# Patient Record
Sex: MALE | Race: WHITE | Employment: OTHER | URBAN - METROPOLITAN AREA
[De-identification: names, ages, dates, MRNs, and addresses within clinical notes are randomized per-mention and may not be internally consistent; named-entity substitution may affect disease eponyms.]

---

## 2017-11-03 PROBLEM — E11.9 TYPE 2 DIABETES MELLITUS WITHOUT COMPLICATION, WITH LONG-TERM CURRENT USE OF INSULIN (HCC): Status: ACTIVE | Noted: 2017-11-03

## 2017-11-03 PROBLEM — I15.2 HYPERTENSION COMPLICATING DIABETES (HCC): Status: ACTIVE | Noted: 2017-11-03

## 2017-11-03 PROBLEM — Z79.4 TYPE 2 DIABETES MELLITUS WITHOUT COMPLICATION, WITH LONG-TERM CURRENT USE OF INSULIN (HCC): Status: ACTIVE | Noted: 2017-11-03

## 2017-11-03 PROBLEM — E11.59 HYPERTENSION COMPLICATING DIABETES (HCC): Status: ACTIVE | Noted: 2017-11-03

## 2017-11-09 ENCOUNTER — HOSPITAL ENCOUNTER (OUTPATIENT)
Dept: GENERAL RADIOLOGY | Age: 67
Discharge: HOME | End: 2017-11-09
Attending: INTERNAL MEDICINE
Payer: MEDICARE

## 2017-11-09 DIAGNOSIS — R09.1 PLEURISY: ICD-10-CM

## 2017-11-09 PROCEDURE — 71020 XR CHEST PA LAT: CPT

## 2017-12-13 ENCOUNTER — HOSPITAL ENCOUNTER (OUTPATIENT)
Dept: ULTRASOUND IMAGING | Age: 67
Discharge: HOME | End: 2017-12-13
Attending: INTERNAL MEDICINE
Payer: MEDICARE

## 2017-12-13 DIAGNOSIS — I10 ESSENTIAL HYPERTENSION, MALIGNANT: ICD-10-CM

## 2017-12-13 DIAGNOSIS — E11.22 TYPE 2 DIABETES MELLITUS WITH ESRD (END-STAGE RENAL DISEASE) (HCC): ICD-10-CM

## 2017-12-13 DIAGNOSIS — N20.0 URIC ACID NEPHROLITHIASIS: ICD-10-CM

## 2017-12-13 DIAGNOSIS — N18.6 TYPE 2 DIABETES MELLITUS WITH ESRD (END-STAGE RENAL DISEASE) (HCC): ICD-10-CM

## 2017-12-13 DIAGNOSIS — N18.30 CHRONIC KIDNEY DISEASE, STAGE III (MODERATE) (HCC): ICD-10-CM

## 2017-12-13 PROCEDURE — 76770 US EXAM ABDO BACK WALL COMP: CPT

## 2018-05-14 PROBLEM — E66.01 SEVERE OBESITY (BMI 35.0-39.9) WITH COMORBIDITY (HCC): Status: ACTIVE | Noted: 2018-05-14

## 2018-05-14 PROBLEM — E11.21 TYPE 2 DIABETES WITH NEPHROPATHY (HCC): Status: ACTIVE | Noted: 2018-05-14

## 2020-07-16 ENCOUNTER — HOSPITAL ENCOUNTER (OUTPATIENT)
Dept: PREADMISSION TESTING | Age: 70
Discharge: HOME | End: 2020-07-16
Payer: MEDICARE

## 2020-07-16 DIAGNOSIS — U07.1 COVID-19: ICD-10-CM

## 2020-07-16 PROCEDURE — 87635 SARS-COV-2 COVID-19 AMP PRB: CPT

## 2020-07-18 LAB — SARS-COV-2, COV2NT: NOT DETECTED

## 2020-07-21 ENCOUNTER — ANESTHESIA (OUTPATIENT)
Dept: NON INVASIVE DIAGNOSTICS | Age: 70
End: 2020-07-21
Payer: MEDICARE

## 2020-07-21 ENCOUNTER — ANESTHESIA EVENT (OUTPATIENT)
Dept: NON INVASIVE DIAGNOSTICS | Age: 70
End: 2020-07-21
Payer: MEDICARE

## 2020-07-21 ENCOUNTER — HOSPITAL ENCOUNTER (OUTPATIENT)
Dept: NON INVASIVE DIAGNOSTICS | Age: 70
Discharge: HOME | End: 2020-07-21
Attending: INTERNAL MEDICINE
Payer: MEDICARE

## 2020-07-21 VITALS
WEIGHT: 293 LBS | OXYGEN SATURATION: 99 % | BODY MASS INDEX: 39.68 KG/M2 | RESPIRATION RATE: 18 BRPM | SYSTOLIC BLOOD PRESSURE: 185 MMHG | HEIGHT: 72 IN | DIASTOLIC BLOOD PRESSURE: 75 MMHG | HEART RATE: 62 BPM

## 2020-07-21 DIAGNOSIS — I35.1 AORTIC VALVE INSUFFICIENCY, ETIOLOGY OF CARDIAC VALVE DISEASE UNSPECIFIED: ICD-10-CM

## 2020-07-21 LAB
ANION GAP SERPL CALC-SCNC: 6 MMOL/L (ref 5–15)
BUN SERPL-MCNC: 41 MG/DL (ref 6–20)
BUN/CREAT SERPL: 19 (ref 12–20)
CALCIUM SERPL-MCNC: 8.2 MG/DL (ref 8.5–10.1)
CHLORIDE SERPL-SCNC: 113 MMOL/L (ref 97–108)
CO2 SERPL-SCNC: 22 MMOL/L (ref 21–32)
CREAT SERPL-MCNC: 2.17 MG/DL (ref 0.7–1.3)
ECHO AV MEAN GRADIENT: 35.42 MMHG
ECHO AV PEAK GRADIENT: 62.5 MMHG
ECHO AV PEAK VELOCITY: 395.28 CM/S
ECHO AV VTI: 90.82 CM
ECHO LVOT PEAK GRADIENT: 8.24 MMHG
ECHO LVOT PEAK VELOCITY: 143.56 CM/S
ECHO LVOT VTI: 32.27 CM
GLUCOSE SERPL-MCNC: 175 MG/DL (ref 65–100)
POTASSIUM SERPL-SCNC: 4.5 MMOL/L (ref 3.5–5.1)
SODIUM SERPL-SCNC: 141 MMOL/L (ref 136–145)

## 2020-07-21 PROCEDURE — 74011250636 HC RX REV CODE- 250/636: Performed by: NURSE ANESTHETIST, CERTIFIED REGISTERED

## 2020-07-21 PROCEDURE — 80048 BASIC METABOLIC PNL TOTAL CA: CPT

## 2020-07-21 PROCEDURE — 93325 DOPPLER ECHO COLOR FLOW MAPG: CPT

## 2020-07-21 PROCEDURE — 36415 COLL VENOUS BLD VENIPUNCTURE: CPT

## 2020-07-21 PROCEDURE — 76060000032 HC ANESTHESIA 0.5 TO 1 HR

## 2020-07-21 PROCEDURE — 74011000250 HC RX REV CODE- 250: Performed by: NURSE ANESTHETIST, CERTIFIED REGISTERED

## 2020-07-21 PROCEDURE — 74011250636 HC RX REV CODE- 250/636: Performed by: INTERNAL MEDICINE

## 2020-07-21 RX ORDER — PROPOFOL 10 MG/ML
INJECTION, EMULSION INTRAVENOUS AS NEEDED
Status: DISCONTINUED | OUTPATIENT
Start: 2020-07-21 | End: 2020-07-21 | Stop reason: HOSPADM

## 2020-07-21 RX ORDER — LIDOCAINE HYDROCHLORIDE 20 MG/ML
INJECTION, SOLUTION EPIDURAL; INFILTRATION; INTRACAUDAL; PERINEURAL AS NEEDED
Status: DISCONTINUED | OUTPATIENT
Start: 2020-07-21 | End: 2020-07-21 | Stop reason: HOSPADM

## 2020-07-21 RX ORDER — HYDRALAZINE HYDROCHLORIDE 20 MG/ML
10 INJECTION INTRAMUSCULAR; INTRAVENOUS ONCE
Status: COMPLETED | OUTPATIENT
Start: 2020-07-21 | End: 2020-07-21

## 2020-07-21 RX ORDER — SODIUM CHLORIDE 9 MG/ML
INJECTION, SOLUTION INTRAVENOUS
Status: DISCONTINUED | OUTPATIENT
Start: 2020-07-21 | End: 2020-07-21 | Stop reason: HOSPADM

## 2020-07-21 RX ORDER — GLYCOPYRROLATE 0.2 MG/ML
INJECTION INTRAMUSCULAR; INTRAVENOUS AS NEEDED
Status: DISCONTINUED | OUTPATIENT
Start: 2020-07-21 | End: 2020-07-21 | Stop reason: HOSPADM

## 2020-07-21 RX ADMIN — HYDRALAZINE HYDROCHLORIDE 10 MG: 20 INJECTION INTRAMUSCULAR; INTRAVENOUS at 09:57

## 2020-07-21 RX ADMIN — PROPOFOL 50 MG: 10 INJECTION, EMULSION INTRAVENOUS at 08:19

## 2020-07-21 RX ADMIN — LIDOCAINE HYDROCHLORIDE 80 MG: 20 INJECTION, SOLUTION EPIDURAL; INFILTRATION; INTRACAUDAL; PERINEURAL at 08:19

## 2020-07-21 RX ADMIN — PROPOFOL 30 MG: 10 INJECTION, EMULSION INTRAVENOUS at 08:20

## 2020-07-21 RX ADMIN — GLYCOPYRROLATE 0.2 MG: 0.2 INJECTION, SOLUTION INTRAMUSCULAR; INTRAVENOUS at 08:20

## 2020-07-21 RX ADMIN — PROPOFOL 30 MG: 10 INJECTION, EMULSION INTRAVENOUS at 08:33

## 2020-07-21 RX ADMIN — SODIUM CHLORIDE: 900 INJECTION, SOLUTION INTRAVENOUS at 08:06

## 2020-07-21 RX ADMIN — PROPOFOL 30 MG: 10 INJECTION, EMULSION INTRAVENOUS at 08:21

## 2020-07-21 RX ADMIN — PROPOFOL 30 MG: 10 INJECTION, EMULSION INTRAVENOUS at 08:25

## 2020-07-21 RX ADMIN — PROPOFOL 30 MG: 10 INJECTION, EMULSION INTRAVENOUS at 08:27

## 2020-07-21 RX ADMIN — PROPOFOL 30 MG: 10 INJECTION, EMULSION INTRAVENOUS at 08:23

## 2020-07-21 RX ADMIN — PROPOFOL 30 MG: 10 INJECTION, EMULSION INTRAVENOUS at 08:29

## 2020-07-21 RX ADMIN — PROPOFOL 30 MG: 10 INJECTION, EMULSION INTRAVENOUS at 08:36

## 2020-07-21 RX ADMIN — HYDRALAZINE HYDROCHLORIDE 10 MG: 20 INJECTION INTRAMUSCULAR; INTRAVENOUS at 10:17

## 2020-07-21 RX ADMIN — PROPOFOL 30 MG: 10 INJECTION, EMULSION INTRAVENOUS at 08:39

## 2020-07-21 NOTE — PROGRESS NOTES
Anesthesia name:  Evelyn Weeks CRNA:     Anesthesia is present for case. Refer to anesthesia log for vitals.

## 2020-07-21 NOTE — ANESTHESIA POSTPROCEDURE EVALUATION
Post-Anesthesia Evaluation and Assessment    Patient: Kaylin Kumar MRN: [id number]  SSN: [id number]    YOB: 1950  Age: 81 y.o. Sex: male       Cardiovascular Function/Vital Signs  Visit Vitals  BP (!) 179/92 (BP 1 Location: Left arm, BP Patient Position: At rest)   Pulse (!) 52   Resp 14   Ht 6' (1.829 m)   Wt 132.9 kg (293 lb)   SpO2 98%   BMI 39.74 kg/m²       Patient is status post * No anesthesia type entered * anesthesia for * No procedures listed *. Nausea/Vomiting: None    Postoperative hydration reviewed and adequate. Pain:  Pain Scale 1: Numeric (0 - 10) (07/21/20 0930)  Pain Intensity 1: 0 (07/21/20 0930)   Managed    Neurological Status: At baseline    Mental Status and Level of Consciousness: Alert and oriented to person, place, and time    Pulmonary Status:   O2 Device: Room air (07/21/20 0930)   Adequate oxygenation and airway patent    Complications related to anesthesia: None    Post-anesthesia assessment completed. No concerns    Signed By: Johnny Lozoya MD     July 21, 2020              * No procedures listed *. MAC    <BSHSIANPOST>    INITIAL Post-op Vital signs:   Vitals Value Taken Time   /92 7/21/2020  9:32 AM   Temp     Pulse 54 7/21/2020  9:44 AM   Resp 13 7/21/2020  9:44 AM   SpO2 99 % 7/21/2020  9:44 AM   Vitals shown include unvalidated device data.

## 2020-07-21 NOTE — PROCEDURES
Transesophageal Procedure Note  [facility]    Patient: Kadi Quarles  MRN: [id number]  SSN: [id number]   YOB: 1950 Age: 81 y.o. 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 Debra Mckoy MD    Assistant(s):  None    Anesthesia: MAC     Estimated Blood Loss: None    Specimens Removed: None    Findings:   · Normal cavity size and systolic function (ejection fraction normal). Mild concentric hypertrophy. Estimated left ventricular ejection fraction is 55 - 60%. Left ventricular diastolic dysfunction. · Mildly dilated left atrium. No spontaneous echo contrast Left atrial appendage velocity is normal (greater than 40 cm/sec). · Agitated saline contrast study was performed and color flow Doppler was used. There was no shunting at baseline. · Prosthetic aortic valve. Aortic valve mean gradient is 35.4 mmHg. Aortic valve area is 1 cm2. Moderate to severe aortic valve stenosis is present. There is a 23 mm [employer] Trifecta bioprosthetic aortic valve. · Mild mitral valve regurgitation is present. LVOT/AV VTI 32.3/90.8 cm  AV Vmax 3.95 m/s  AV MG 35.4 mmHg  AV PG 62.5 mmHg  LVOTd 1.9 cm     TAYLER 1.01 cm2  DI 0.36     Plan RHC/LVEDP/aortic valve study (no coronaries) to evaluate aortic valve hemodynamics. Case discussed with Dr. Kelly Velazquez. Results and plans discussed with patient and his wife. Complications: None     Implants:  None    Signed by:   Davian Johnson MD  Structural / [id number]  07/21/20 9:52 AM

## 2020-07-21 NOTE — ANESTHESIA PREPROCEDURE EVALUATION
Anesthetic History   No history of anesthetic complications            Review of Systems / Medical History  Patient summary reviewed, nursing notes reviewed and pertinent labs reviewed    Pulmonary  Within defined limits                 Neuro/Psych   Within defined limits           Cardiovascular    Hypertension  Valvular problems/murmurs: aortic stenosis        CAD         GI/Hepatic/Renal         Renal disease: CRI and stones       Endo/Other    Diabetes    Morbid obesity     Other Findings              Physical Exam    Airway  Mallampati: II  TM Distance: > 6 cm  Neck ROM: normal range of motion   Mouth opening: Normal     Cardiovascular  Regular rate and rhythm,  S1 and S2 normal,  no murmur, click, rub, or gallop             Dental  No notable dental hx       Pulmonary  Breath sounds clear to auscultation               Abdominal  GI exam deferred       Other Findings            Anesthetic Plan    ASA: 3  Anesthesia type: MAC          Induction: Intravenous  Anesthetic plan and risks discussed with: Patient

## 2020-07-30 PROBLEM — Z91.199 NONADHERENCE TO MEDICAL TREATMENT: Chronic | Status: ACTIVE | Noted: 2020-07-30

## 2020-07-30 PROBLEM — E11.59 HYPERTENSION COMPLICATING DIABETES (HCC): Status: RESOLVED | Noted: 2017-11-03 | Resolved: 2020-07-30

## 2020-07-30 PROBLEM — I15.2 HYPERTENSION COMPLICATING DIABETES (HCC): Status: RESOLVED | Noted: 2017-11-03 | Resolved: 2020-07-30

## 2020-08-06 ENCOUNTER — HOSPITAL ENCOUNTER (OUTPATIENT)
Dept: PREADMISSION TESTING | Age: 70
Discharge: HOME | End: 2020-08-06
Payer: MEDICARE

## 2020-08-06 DIAGNOSIS — U07.1 COVID-19: ICD-10-CM

## 2020-08-06 PROCEDURE — 87635 SARS-COV-2 COVID-19 AMP PRB: CPT

## 2020-08-07 LAB — SARS-COV-2, COV2NT: NOT DETECTED

## 2020-08-10 ENCOUNTER — HOSPITAL ENCOUNTER (OUTPATIENT)
Age: 70
Setting detail: OUTPATIENT SURGERY
Discharge: HOME | End: 2020-08-10
Attending: INTERNAL MEDICINE | Admitting: INTERNAL MEDICINE
Payer: MEDICARE

## 2020-08-10 VITALS
HEART RATE: 70 BPM | HEIGHT: 72 IN | RESPIRATION RATE: 20 BRPM | OXYGEN SATURATION: 94 % | WEIGHT: 296 LBS | BODY MASS INDEX: 40.09 KG/M2 | DIASTOLIC BLOOD PRESSURE: 67 MMHG | TEMPERATURE: 98.2 F | SYSTOLIC BLOOD PRESSURE: 163 MMHG

## 2020-08-10 DIAGNOSIS — I35.0 AORTIC VALVE STENOSIS, ETIOLOGY OF CARDIAC VALVE DISEASE UNSPECIFIED: ICD-10-CM

## 2020-08-10 LAB
GLUCOSE BLD STRIP.AUTO-MCNC: 134 MG/DL (ref 65–100)
GLUCOSE BLD STRIP.AUTO-MCNC: 99 MG/DL (ref 65–100)
SERVICE CMNT-IMP: ABNORMAL
SERVICE CMNT-IMP: NORMAL

## 2020-08-10 PROCEDURE — 74011636320 HC RX REV CODE- 636/320: Performed by: INTERNAL MEDICINE

## 2020-08-10 PROCEDURE — C1769 GUIDE WIRE: HCPCS | Performed by: INTERNAL MEDICINE

## 2020-08-10 PROCEDURE — 82962 GLUCOSE BLOOD TEST: CPT

## 2020-08-10 PROCEDURE — 77030013797 HC KT TRNSDUC PRSSR EDWD -A: Performed by: INTERNAL MEDICINE

## 2020-08-10 PROCEDURE — 99152 MOD SED SAME PHYS/QHP 5/>YRS: CPT | Performed by: INTERNAL MEDICINE

## 2020-08-10 PROCEDURE — 74011250636 HC RX REV CODE- 250/636: Performed by: INTERNAL MEDICINE

## 2020-08-10 PROCEDURE — 77030019569 HC BND COMPR RAD TERU -B: Performed by: INTERNAL MEDICINE

## 2020-08-10 PROCEDURE — 99153 MOD SED SAME PHYS/QHP EA: CPT | Performed by: INTERNAL MEDICINE

## 2020-08-10 PROCEDURE — 74011000250 HC RX REV CODE- 250: Performed by: INTERNAL MEDICINE

## 2020-08-10 PROCEDURE — 77030004532 HC CATH ANGI DX IMP BSC -A: Performed by: INTERNAL MEDICINE

## 2020-08-10 PROCEDURE — C1894 INTRO/SHEATH, NON-LASER: HCPCS | Performed by: INTERNAL MEDICINE

## 2020-08-10 PROCEDURE — 93461 R&L HRT ART/VENTRICLE ANGIO: CPT | Performed by: INTERNAL MEDICINE

## 2020-08-10 PROCEDURE — C1887 CATHETER, GUIDING: HCPCS | Performed by: INTERNAL MEDICINE

## 2020-08-10 PROCEDURE — 77030010221 HC SPLNT WR POS TELE -B: Performed by: INTERNAL MEDICINE

## 2020-08-10 PROCEDURE — C1751 CATH, INF, PER/CENT/MIDLINE: HCPCS | Performed by: INTERNAL MEDICINE

## 2020-08-10 RX ORDER — SODIUM CHLORIDE 0.9 % (FLUSH) 0.9 %
5-40 SYRINGE (ML) INJECTION AS NEEDED
Status: DISCONTINUED | OUTPATIENT
Start: 2020-08-10 | End: 2020-08-10 | Stop reason: HOSPADM

## 2020-08-10 RX ORDER — AMLODIPINE BESYLATE 5 MG/1
5 TABLET ORAL DAILY
Status: ON HOLD | COMMUNITY
End: 2020-09-29 | Stop reason: SDUPTHER

## 2020-08-10 RX ORDER — SODIUM CHLORIDE 9 MG/ML
150 INJECTION, SOLUTION INTRAVENOUS CONTINUOUS
Status: DISCONTINUED | OUTPATIENT
Start: 2020-08-10 | End: 2020-08-10 | Stop reason: HOSPADM

## 2020-08-10 RX ORDER — NALOXONE HYDROCHLORIDE 0.4 MG/ML
0.4 INJECTION, SOLUTION INTRAMUSCULAR; INTRAVENOUS; SUBCUTANEOUS AS NEEDED
Status: DISCONTINUED | OUTPATIENT
Start: 2020-08-10 | End: 2020-08-10 | Stop reason: HOSPADM

## 2020-08-10 RX ORDER — SODIUM CHLORIDE 0.9 % (FLUSH) 0.9 %
5-40 SYRINGE (ML) INJECTION EVERY 8 HOURS
Status: DISCONTINUED | OUTPATIENT
Start: 2020-08-10 | End: 2020-08-10 | Stop reason: HOSPADM

## 2020-08-10 RX ORDER — FENTANYL CITRATE 50 UG/ML
INJECTION, SOLUTION INTRAMUSCULAR; INTRAVENOUS AS NEEDED
Status: DISCONTINUED | OUTPATIENT
Start: 2020-08-10 | End: 2020-08-10 | Stop reason: HOSPADM

## 2020-08-10 RX ORDER — DIPHENHYDRAMINE HYDROCHLORIDE 50 MG/ML
25 INJECTION, SOLUTION INTRAMUSCULAR; INTRAVENOUS
Status: DISCONTINUED | OUTPATIENT
Start: 2020-08-10 | End: 2020-08-10 | Stop reason: HOSPADM

## 2020-08-10 RX ORDER — HYDROCORTISONE SODIUM SUCCINATE 100 MG/2ML
100 INJECTION, POWDER, FOR SOLUTION INTRAMUSCULAR; INTRAVENOUS ONCE
Status: COMPLETED | OUTPATIENT
Start: 2020-08-10 | End: 2020-08-10

## 2020-08-10 RX ORDER — VERAPAMIL HYDROCHLORIDE 2.5 MG/ML
INJECTION, SOLUTION INTRAVENOUS AS NEEDED
Status: DISCONTINUED | OUTPATIENT
Start: 2020-08-10 | End: 2020-08-10 | Stop reason: HOSPADM

## 2020-08-10 RX ORDER — DIPHENHYDRAMINE HYDROCHLORIDE 50 MG/ML
25 INJECTION, SOLUTION INTRAMUSCULAR; INTRAVENOUS ONCE
Status: COMPLETED | OUTPATIENT
Start: 2020-08-10 | End: 2020-08-10

## 2020-08-10 RX ORDER — HEPARIN SODIUM 200 [USP'U]/100ML
INJECTION, SOLUTION INTRAVENOUS
Status: COMPLETED | OUTPATIENT
Start: 2020-08-10 | End: 2020-08-10

## 2020-08-10 RX ORDER — HEPARIN SODIUM 1000 [USP'U]/ML
INJECTION, SOLUTION INTRAVENOUS; SUBCUTANEOUS AS NEEDED
Status: DISCONTINUED | OUTPATIENT
Start: 2020-08-10 | End: 2020-08-10 | Stop reason: HOSPADM

## 2020-08-10 RX ORDER — LIDOCAINE HYDROCHLORIDE 10 MG/ML
INJECTION INFILTRATION; PERINEURAL AS NEEDED
Status: DISCONTINUED | OUTPATIENT
Start: 2020-08-10 | End: 2020-08-10 | Stop reason: HOSPADM

## 2020-08-10 RX ORDER — MIDAZOLAM HYDROCHLORIDE 1 MG/ML
INJECTION, SOLUTION INTRAMUSCULAR; INTRAVENOUS AS NEEDED
Status: DISCONTINUED | OUTPATIENT
Start: 2020-08-10 | End: 2020-08-10 | Stop reason: HOSPADM

## 2020-08-10 RX ADMIN — SODIUM CHLORIDE 150 ML/HR: 900 INJECTION, SOLUTION INTRAVENOUS at 17:20

## 2020-08-10 RX ADMIN — HYDROCORTISONE SODIUM SUCCINATE 100 MG: 100 INJECTION, POWDER, FOR SOLUTION INTRAMUSCULAR; INTRAVENOUS at 14:40

## 2020-08-10 RX ADMIN — DIPHENHYDRAMINE HYDROCHLORIDE 25 MG: 50 INJECTION, SOLUTION INTRAMUSCULAR; INTRAVENOUS at 14:39

## 2020-08-10 NOTE — ROUTINE PROCESS
Cardiac Cath Lab Recovery Arrival Note: 
 
 
Patience Mares arrived to Cardiac Cath Lab, Recovery Area. Staff introduced to patient. Patient identifiers verified with NAME and DATE OF BIRTH. Procedure verified with patient. Consent forms reviewed and signed by patient or authorized representative and verified. Allergies verified. Patient and family oriented to department. Patient and family informed of procedure and plan of care. Questions answered with review. Patient prepped for procedure, per orders from physician, prior to arrival. 
 
Patient on cardiac monitor, non-invasive blood pressure, SPO2 monitor. On RA. Patient is A&Ox 4. Patient reports no pain. Patient in stretcher, in low position, with side rails up, call bell within reach, patient instructed to call if assistance as needed. Patient prep in: [address], [place]. Patient family has pager # Family in: . Prep by: ERMELINDA Long RN

## 2020-08-10 NOTE — DISCHARGE INSTRUCTIONS
**You will receive a call from Dr. Magalene Cranker office to get repeat blood work done to check your kidney function -- this will be checked sometime in the next 1-2 weeks. **If your kidney function is stable, then we will do a CT scan of your chest/abdomen/pelvis to plan a possible valve replacement procedure. YOUR CURRENT MEDICATIONS  Current Discharge Medication List      CONTINUE these medications which have NOT CHANGED    Details   amLODIPine (NORVASC) 5 mg tablet Take 5 mg by mouth daily. insulin lispro protamine/insulin lispro (HumaLOG Mix 75-25,U-100,Insuln) 100 unit/mL (75-25) injection 50 Units by SubCUTAneous route Before breakfast and dinner. Indications: type 2 diabetes mellitus  Qty: 8 Vial, Refills: 5      pioglitazone (ACTOS) 15 mg tablet Take 1 Tab by mouth daily. Qty: 90 Tab, Refills: 0      losartan (COZAAR) 100 mg tablet Take 1 Tab by mouth daily. Qty: 90 Tab, Refills: 0      allopurinoL (ZYLOPRIM) 100 mg tablet Take 0.5 Tabs by mouth daily. (Dose = 0.5 x 100 mg tablet)  Qty: 45 Tab, Refills: 0      atorvastatin (Lipitor) 40 mg tablet Take 1 Tab by mouth daily. Qty: 90 Tab, Refills: 0      Insulin Syringe-Needle U-100 (BD INSULIN SYRINGE ULTRA-FINE) 1 mL 30 gauge x 1/2\" syrg Use twice a day. Dx: E11.9  Qty: 300 Syringe, Refills: 4    Associated Diagnoses: Type 2 diabetes mellitus without complication, with long-term current use of insulin (HCC)      furosemide (LASIX) 40 mg tablet Take 1 Tab by mouth daily as needed for Other (swelling). Qty: 90 Tab, Refills: 4      CALCIUM CARBONATE (CALCIUM 500 PO) Take 300 mg by mouth daily. ascorbic acid, vitamin C, (VITAMIN C) 1,000 mg tablet Take 2,000 mg by mouth daily. magnesium oxide (MAG-OX) 400 mg tablet Take 400 mg by mouth daily. potassium 99 mg tablet Take 99 mg by mouth daily. turmeric root extract 500 mg cap Take  by mouth. cholecalciferol, vitamin D3, 2,000 unit tab Take 5,000 Units by mouth daily.       b complex vitamins (B COMPLEX 1) tablet Take 1 Tab by mouth daily. After Your Cardiac Catheterization    Cardiac catheterization (also called cardiac cath) is an invasive imaging procedure that allows your doctor to look at your coronary arteries to diagnose coronary artery disease. It can also be used to measure pressures in your chambers, and evaluate the function of your heart. Instructions for going home after Cardiac Catheterization    Care for the Catheter Insertion Site  Procedures may be performed in the femoral artery in the groin (in the area at the top of your thigh) or in the radial artery in your arm. When you go home, there will be a bandage (dressing) over the catheter insertion site (also called the wound site).  The morning after your procedure, you may take the dressing off. The easiest way to do this is when you are showering, get the tape and dressing wet and remove it.  After the bandage is removed, cover the area with a small adhesive bandage. It is normal for the catheter insertion site to be black and blue for a couple of days. The site may also be slightly swollen and pink, and there may be a small lump (about the size of a quarter) at the site.  Wash the catheter insertion site at least once daily with soap and water. Place soapy water on your hand or washcloth and gently wash the insertion site; do not rub.  Keep the area clean and dry when you are not showering.  Do not use powders, creams, lotions or ointment on the wound site.  Wear loose clothes and loose underwear.  Do not take a bath, tub soak, go in a Jacuzzi, or swim in a pool or lake for one week after the procedure.  You may notice a small lump at the site. This is normal and may last up to 6 weeks. CHECK THE CATHETER INSERTION SITE DAILY:    If bleeding at the cath site occurs, take a clean washcloth and apply direct pressure just above the puncture site for at least 15 minutes.   Call [phone] immediately if the bleeding is not controlled. Continue to apply direct pressure until an ambulance gets to your location. Do not try to drive yourself or have someone else drive you to the hospital.  Have the ambulance bring you to the emergency room. Activity Guidelines  Your doctor will tell you when you can resume activities. In general, you will need to take it easy for the first two days after you get home. You can expect to feel tired and weak the day after the procedure. Take walks around your house and plan to rest during the day. For femoral cardiac cath   Do not strain during bowel movements for the first 3 to 4 days after the procedure to prevent bleeding from the catheter insertion site.  Avoid heavy lifting (more than 10 pounds) and pushing or pulling heavy objects for the first 5 to 7 days after the procedure.  Do not participate in strenuous activities for 5 days after the procedure. This includes most sports - jogging, golfing, play tennis, and bowling.  You may climb stairs if needed, but walk up and down the stairs more slowly than usual.    Gradually increase your activities until you reach your normal activity level within one week after the procedure. For radial cardiac cath   Do not participate in strenuous activities for 2 days after the procedure. This includes most sports - jogging, golfing, play tennis, and bowling.  Gradually increase your activities until you reach your normal activity level within two days after the procedure. Ask your doctor when it is safe to   Return to work.  Resume sexual activity.  Resume driving. Most people are able to resume driving within 24 hours after going home. Medications   Please review your medications with your doctor before you go home. Ask your doctor if you should continue taking the medications you were taking before the procedure.     If you have diabetes, your doctor may adjust your diabetes medications for one to two days after your procedure. Please be sure to ask for specific directions about taking your diabetes medication after the procedure.  Depending on the results of your procedure, your doctor may prescribe new medication. Please make sure you understand what medications you should be taking after the procedure and how often to take them.  You may use Tylenol 325mg 1-2 tablets every 6 hours as needed for pain or discomfort, unless otherwise instructed. If you have significant         discomfort more than 48 hours after your procedure, please call your physicians office.  If you take METFORMIN, do NOT take this for the next 2 days after your procedure. Fluid Guidelines  Be sure to drink eight to ten glasses of clear fluids (water is preferred) for the 24 hours to flush the contrast material from your system. Importance of a Heart-Healthy Lifestyle  It is important for you to be committed to leading a heart-healthy lifestyle. Your health care team can help you achieve your goals, but it is up to you to take your medications as prescribed, make dietary changes, quit smoking, exercise regularly, keep your follow-up appointments and be an active member of the treatment team.    Follow Up  Please call your cardiologist to schedule a follow-up appointment in the next 1-2 weeks if possible. Ask your primary care doctor when you should return for follow-up. Please ask your doctor if you have any questions about cardiac catheterization, angioplasty or stenting. If possible, have someone stay with you for the first night. It is normal to feel tired for the first couple of days. Take it easy and follow your physicians instructions on activity. CALL THE PHYSICIAN:  ? If the site becomes red, swollen, or feels warm to the touch, or is healing poorly    ? If you note any large/extending bruise, fever >101.0 or chills  ?  If your extremity has numbness, tingling, discoloration, abnormal swelling, tightness or pain   ? If you have difficulty with urination or develop nausea, vomiting, or severe abdominal pain    SIGNS AND SYMPTOMS:  ? Notify your physician for new or recurrent symptoms of chest discomfort, unusual shortness of breath or fatigue. These could be signs of a problem and should be discussed with your physician. ? For significant chest pain or symptoms of angina not relieved with rest:  if you have been prescribed Nitroglycerin, take as directed (taken under the tongue, one at a time 5 minutes apart for a total of 3 doses, sitting down). If the discomfort is not relieved after the 3rd Nitroglycerin, call 911. If you have not been prescribed Nitroglycerin and your chest discomfort is significant, call 911. Take the ambulance, do not try to drive yourself or have someone else drive you to the hospital.     AFTER CARE:  ? Follow up with your physician as instructed  ? Follow a heart healthy diet with proper portion control, daily stress management, daily exercise, blood pressure and cholesterol control, and smoking cessation. The success of your stent, if you had one placed, and the prevention of future catheterizations heavily depends on your lifestyle changes you make now! ? You may start walking short distances the day of your procedure. Gradually increase as tolerated each day. Current activity recommendations are 30 minutes of exercise at least 5 days a week. Work up to this as you recover. Walk, ride a bike, or choose any other activity you enjoy to reach this activity goal.   ? Avoid walking outside in extremes of heat or cold. Walk inside (at home, at the mall, or at a large store) when it is cold and windy or hot and humid. ? If you had a stent placed, consider [facility] as a resource to help you make the needed lifestyle changes to live a heart healthy lifestyle. Discuss your candidacy with your physician.      If you have questions, call your physicians office or the Cardiac Cath Lab at [phone]. The Cath Lab is operational from 6:30 a.m. to 5:00 p.m., Monday through Friday. After hours, notify your physician. [address] [place] [address] can be reached at [phone]. [facility] is operational Monday-Thursday 8:30 a.m. to 5:00 p.m. and Friday 8:30 a.m. to 12:00 p.m.     Remember:  IN CASE OF BLEEDING: KEEP FIRM PRESSURE ON THE PROCEDURE SITE AND CALL 911 IF NOT CONTROLLED

## 2020-08-10 NOTE — PROCEDURES
Cardiac Catheterization Lab Procedure Note  [facility]    Patient: Brad Cole  MRN: [id number]  SSN: [id number]   YOB: 1950 Age: 78 y.o. Sex: male      Date of Procedure: 8/10/2020     Pre-procedure Diagnosis: Prosthetic aortic valve stenosis, multivessel CAD    Post-procedure Diagnosis: Prosthetic aortic valve stenosis, multivessel CAD    Procedure: Right Heart Cath and Left Heart Cath with Bypass Grafts / Coronary Angiography, Aortic Valve Hemodynamic Study    :  Dr. Blanka Hammond MD    Assistant(s):  None    Anesthesia: Moderate Sedation     Estimated Blood Loss: Less than 10 mL     Specimens Removed: None    Findings:   · Catheters used: 5 Fr JL4, JR4, LCB diagnostic catheters; 6 Fr [place] catheter  · Successful hemostasis of the 6 Fr LRA access site using a TR band  · Succsssful hemostasis of the 6 Fr RIJ access site using manual compression     1. No significant change in native CAD with high-grade stenosis in proximal ramus intermedius and sub-totally occluded proximal LAD.     2. Widely patent LIMA-LAD and SVG-ramus intermedius.     3. Moderate/severe bioprosthetic aortic valve stenosis (EOA 1.31-1.40 cm2, EOAi 0.52-0.55 cm2/m2 using BSA 2.52, MG 39 mmHg). No hemodynamically significant aortic regurgitation. There is likely mild to moderate structural valve degeneration with a component of prosthesis-patient mismatch, leading to a low EOAi.        4. High-normal right-sided filling pressure (RA 12/11/9) with moderately elevated left-sided filling pressures (PCWP 13/13/11; LVEDP 25 mmHg).     5. High-normal pulmonary artery pressures (PA 34/13/22) with normal PVR (1.93 Farah), TPG 11 mmHg.     6. Normal cardiac output (Sariah 5.71 L/min) and cardiac index (Sariah 2.2 L/min/m2).     7. Normal SVR (1205 dynes/sec/cm-5).    8. Results discussed with the patient, the patient's wife and Dr. Augusto Randolph (the patient's surgeon in 2014 for CABG + AVR).      9.  Continue evaluation for possible Marybeth TAVR vs. re-do bioprosthetic AVR with root enlargement vs. redo mechanical AVR. Complications: None     Implants:  None    Signed by:   Nadeem Umanzor.  Neha Wilcox MD  Structural / [id number]  08/10/20 4:49 PM

## 2020-09-15 ENCOUNTER — OFFICE VISIT (OUTPATIENT)
Dept: CARDIOLOGY CLINIC | Age: 70
End: 2020-09-15
Payer: MEDICARE

## 2020-09-15 VITALS
BODY MASS INDEX: 40.62 KG/M2 | SYSTOLIC BLOOD PRESSURE: 164 MMHG | RESPIRATION RATE: 16 BRPM | HEART RATE: 68 BPM | DIASTOLIC BLOOD PRESSURE: 82 MMHG | WEIGHT: 299.5 LBS | OXYGEN SATURATION: 98 %

## 2020-09-15 DIAGNOSIS — I35.0 AORTIC VALVE STENOSIS, ETIOLOGY OF CARDIAC VALVE DISEASE UNSPECIFIED: Primary | ICD-10-CM

## 2020-09-15 PROCEDURE — 99204 OFFICE O/P NEW MOD 45 MIN: CPT | Performed by: THORACIC SURGERY (CARDIOTHORACIC VASCULAR SURGERY)

## 2020-09-15 RX ORDER — GUAIFENESIN 100 MG/5ML
162 LIQUID (ML) ORAL DAILY
COMMUNITY
End: 2020-09-29

## 2020-09-15 NOTE — PROGRESS NOTES
Patient: La Nena Mobley   Age: 78 y.o. Patient Care Team:  Brendan Chi MD as PCP - General (Internal Medicine)  Carter Cobb MD as Referring Provider (Cardiology)    PCP: Brendan Chi MD    Cardiologist: Dr. Twan Flores    Diagnosis/Reason for Consultation: The encounter diagnosis was Aortic valve stenosis, etiology of cardiac valve disease unspecified. Problem List:   Patient Active Problem List   Diagnosis Code    DM (diabetes mellitus) ([facility] E11.9    Hyperlipidemia E78.5    Kidney stone N20.0    CKD (chronic kidney disease), stage III ([facility] N18.3    Hypovitaminosis D E55.9    S/P CABG x 2 Z95.1    S/P AVR (aortic valve replacement) Z95.2    Hypotension I95.9    LBP (low back pain) M54.5    Type 2 diabetes mellitus without complication, with long-term current use of insulin ([facility]) E11.9, Z79.4    Type 2 diabetes with nephropathy ([facility] E11.21    Severe obesity (BMI 35.0-39. 9) with comorbidity ([facility] E66.01    Nonadherence to medical treatment Z91.19         HPI: 78 y.o.  male with PMHx of s/p CABG x 2, AVR (23 Trifecta) in 10/2014 by Dr. Flaca Kelsey d/t endocarditis, CKD3, CHF, HTN, GERD, A flutter, DM2, s/p partial colectomy, ANDRY (uses CPAP), BPH, HLD; that is referred to the [address] [place] [address] by Dr. 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 Pt reports feeling more dyspnea, fatigue, and weakness in last 2-3 weeks. He has gained 20-30 lbs since beginning on pandemic in March 2020. THOMPSON with minimal exertion, with walking short distances. He now has to stop several times to rest while cutting grass, where last year he could do this without stopping. Denies CP, orthopnea, PND, syncope, dizziness, falls, palpitations. He has some mild LE edema that is unchanged. Intermittently his lower legs get red L > R and resolve within a few days. Former tobacco smoker, quit in 2000. Drinks alcohol rarely and denies drug use.  and lives independently with his wife. His family history is positive for heart disease in his mother. He does not use any assistive devices. Of note, pt had new dx of A flutter in June 2020 at Dr. Brenden Lopez office, was started on eliquis. Pt only took for a couple days and stopped because he didn't like the appearance of his blood. He is now taking two baby aspirin instead. Pt's left foot 3rd toe noted to be red, pt reports picking at the nail and its been red x 3 days. He has been applying alcohol to it. Blood/Blackness/Tarriness of stools: No:    Heart Failure Admission w/in past year:  No:   Heart Failure Admission w/in past 2 weeks: No:     NYHA Classification: IIIB   Class I (Mild): No limitation of physical activity. Ordinary physical activity does not cause undue fatigue, palpitation, or dyspnea. Class II (Mild): Slight limitation of physical activity. Comfortable at rest, but ordinary physical activity results in fatigue, palpitation, or dyspnea. Class III (Moderate): Marked limitation of physical activity. Comfortable at rest, but less than ordinary activity causes fatigue, palpitation, or dyspnea   Class IV (Severe): Unable to carry out any physical activity without discomfort. Symptoms  of cardiac insufficiency at rest.  If any physical activity is undertaken, discomfort is increased.     Angina Classification: 1   Class 0: No symptoms   Class 1: Angina with strenuous exercise   Class 2: Angina with moderate exercise   Class 3: Angina with mild exertion   Walking 1-2 level blocks at normal pace; Climbing 1 flight of stairs at normal pace  Class 4: Angina at any level of physical exertion       Past Medical History:   Diagnosis Date    BPH (benign prostatic hypertrophy)     CAD (coronary artery disease)     CHF NYHA class III (symptoms with mildly strenuous activities) ([facility] 10/24/2014    Chronic kidney disease     diabetic kidneys    CKD (chronic kidney disease), stage III ([facility] 2014    Coagulation disorder ([facility]     PLAVIX    DM (diabetes mellitus) ([facility] 2011    Endocarditis of aortic valve 10/6/2014    GERD (gastroesophageal reflux disease)     HTN (hypertension) 2011    Hyperlipidemia 2011    Hypovitaminosis D 2014    Kidney stone 2011     Endocarditis: Yes  -treated in    Pulmonary HTN:  No: 30s/20s Greater than 2/3 systemic: No:   Immunocompromised/Steroids: No:   Liver Dz/Cirrhosis: No:    If yes, MELD score: n/a  Last Dental Visit:  2020   Any dental pain/concerns: no    Past Surgical History:   Procedure Laterality Date    CARDIAC SURG PROCEDURE UNLIST  10/2014    CABG x 2 & AVR    HX CHOLECYSTECTOMY      HX COLECTOMY      diverticulits    HX HEENT      tonsils & adnoids    HX HEENT      wisdom teeth    HX OTHER SURGICAL      dilation of esophagus    HX OTHER SURGICAL      fistulectomy      Social History     Tobacco Use    Smoking status: Former Smoker     Packs/day: 4.00     Years: 30.00     Pack years: 120.00     Types: Cigarettes     Last attempt to quit: 2000     Years since quittin.7    Smokeless tobacco: Former User   Substance Use Topics    Alcohol use: Yes     Comment: RARE      Family History   Problem Relation Age of Onset    Cancer Father         UNKNOWN PRIMARY    Heart Attack Mother     Diabetes Mother     Asthma Mother     Heart Disease Mother     Elevated Lipids Mother     Thyroid Disease Sister     Anesth Problems Neg Hx      Prior to Admission medications    Medication Sig Start Date End Date Taking? Authorizing Provider   aspirin 81 mg chewable tablet Take 162 mg by mouth daily. Taking 162 mg since stopped eliquis   Yes Provider, Historical   amLODIPine (NORVASC) 5 mg tablet Take 5 mg by mouth daily.     Provider, Historical   insulin lispro protamine/insulin lispro (HumaLOG Mix 75-25,U-100,Insuln) 100 unit/mL (75-25) injection 50 Units by SubCUTAneous route Before breakfast and dinner. Indications: type 2 diabetes mellitus 8/4/20   Dg Vang MD   pioglitazone (ACTOS) 15 mg tablet Take 1 Tab by mouth daily. 7/7/20   Dg Vang MD   losartan (COZAAR) 100 mg tablet Take 1 Tab by mouth daily. 7/7/20   Dg Vang MD   allopurinoL (ZYLOPRIM) 100 mg tablet Take 0.5 Tabs by mouth daily. (Dose = 0.5 x 100 mg tablet) 7/7/20   Dg Vang MD   atorvastatin (Lipitor) 40 mg tablet Take 1 Tab by mouth daily. 7/7/20   Dg Vang MD   Insulin Syringe-Needle U-100 (BD INSULIN SYRINGE ULTRA-FINE) 1 mL 30 gauge x 1/2\" syrg Use twice a day. Dx: E11.9 1/2/20   Lyla Calvert MD   furosemide (LASIX) 40 mg tablet Take 1 Tab by mouth daily as needed for Other (swelling). 10/15/19   Lyla Calvert MD   CALCIUM CARBONATE (CALCIUM 500 PO) Take 300 mg by mouth daily. Provider, Historical   ascorbic acid, vitamin C, (VITAMIN C) 1,000 mg tablet Take 2,000 mg by mouth daily. Provider, Historical   magnesium oxide (MAG-OX) 400 mg tablet Take 400 mg by mouth daily. Provider, Historical   potassium 99 mg tablet Take 99 mg by mouth daily. Provider, Historical   turmeric root extract 500 mg cap Take  by mouth. Provider, Historical   cholecalciferol, vitamin D3, 2,000 unit tab Take 5,000 Units by mouth daily. Provider, Historical   b complex vitamins (B COMPLEX 1) tablet Take 1 Tab by mouth daily. Provider, Historical       Allergies   Allergen Reactions    Iodinated Contrast Media Anaphylaxis     THROAT CLOSED UP    Nafcillin Other (comments)     Dyspnea and chest tightness    Nsaids (Non-Steroidal Anti-Inflammatory Drug) Other (comments)     Patient was told by MD not to take NSAIDS while taking PLAVIX       Current Medications:   Current Outpatient Medications   Medication Sig Dispense Refill    aspirin 81 mg chewable tablet Take 162 mg by mouth daily. Taking 162 mg since stopped eliquis      amLODIPine (NORVASC) 5 mg tablet Take 5 mg by mouth daily.       insulin lispro protamine/insulin lispro (HumaLOG Mix 75-25,U-100,Insuln) 100 unit/mL (75-25) injection 50 Units by SubCUTAneous route Before breakfast and dinner. Indications: type 2 diabetes mellitus 8 Vial 5    pioglitazone (ACTOS) 15 mg tablet Take 1 Tab by mouth daily. 90 Tab 0    losartan (COZAAR) 100 mg tablet Take 1 Tab by mouth daily. 90 Tab 0    allopurinoL (ZYLOPRIM) 100 mg tablet Take 0.5 Tabs by mouth daily. (Dose = 0.5 x 100 mg tablet) 45 Tab 0    atorvastatin (Lipitor) 40 mg tablet Take 1 Tab by mouth daily. 90 Tab 0    Insulin Syringe-Needle U-100 (BD INSULIN SYRINGE ULTRA-FINE) 1 mL 30 gauge x 1/2\" syrg Use twice a day. Dx: E11.9 300 Syringe 4    furosemide (LASIX) 40 mg tablet Take 1 Tab by mouth daily as needed for Other (swelling). 90 Tab 4    CALCIUM CARBONATE (CALCIUM 500 PO) Take 300 mg by mouth daily.  ascorbic acid, vitamin C, (VITAMIN C) 1,000 mg tablet Take 2,000 mg by mouth daily.  magnesium oxide (MAG-OX) 400 mg tablet Take 400 mg by mouth daily.  potassium 99 mg tablet Take 99 mg by mouth daily.  turmeric root extract 500 mg cap Take  by mouth.  cholecalciferol, vitamin D3, 2,000 unit tab Take 5,000 Units by mouth daily.  b complex vitamins (B COMPLEX 1) tablet Take 1 Tab by mouth daily. Vitals: Blood pressure (!) 164/82, pulse 68, resp. rate 16, weight 299 lb 8 oz (135.9 kg), SpO2 98 %. Allergies: is allergic to iodinated contrast media; nafcillin; and nsaids (non-steroidal anti-inflammatory drug).     Review of Systems: Pertinent Positives per HPI   [] Unable to obtain  ROS due to  []mental status change  []sedated   []intubated   [x]Total of 13 systems reviewed as follows:  Constitutional: Negative fever, negative chills  Eyes:   Negative for amauroses fugax  ENT:   Negative sore throat,oral absecess  Endocrine Negative for thyroid replacement Rx; goiter; DM  Respiratory:  Negative chronic cough,sputum production  Cards:   Negative for palpitations, lower extremity edema, varicosities, claudication  GI:   Negative for dysphagia, bleeding, nausea, vomiting, diarrhea, and abdominal pain  Genitourinary: Negative for frequency, dysuria, BPH   Integument:  Negative for rash and pruritus  Hematologic:  Negative for easy bruising; bleeding dyscarsia  Musculoskel: Negative for muscle weakness inhibiting ambulation  Neurological:  Negative for stroke, TIA, syncope, dizziness  Behavl/Psych: Negative for feelings of anxiety, depression     Cardiovascular Testing:   EK NSR    TTE 2020: Mod to severe degenerative bioprosthetic aortic stenosis, valve well seated, mean gradient 28, peak 48 mm HG, TAYLER 1.0 cm2. Normal LV EF 85%, grade 2 diastolic dysfunction    CRISTINA 20:   · Normal cavity size and systolic function (ejection fraction normal). Mild concentric hypertrophy. Estimated left ventricular ejection fraction is 55 - 60%. Left ventricular diastolic dysfunction. · Mildly dilated left atrium. No spontaneous echo contrast Left atrial appendage velocity is normal (greater than 40 cm/sec). · Agitated saline contrast study was performed and color flow Doppler was used. There was no shunting at baseline. · Prosthetic aortic valve. Aortic valve mean gradient is 35.4 mmHg. Aortic valve area is 1 cm2. Moderate to severe aortic valve stenosis is present. There is a 23 mm [facility] Trifecta bioprosthetic aortic valve. · Mild mitral valve regurgitation is present. LVOT/AV VTI 32.3/90.8 cm  AV Vmax 3.95 m/s  AV MG 35.4 mmHg  AV PG 62.5 mmHg  LVOTd 1.9 cm     TAYLER 1.01 cm2  DI 0.36     Plan RHC/LVEDP/aortic valve study (no coronaries) to evaluate aortic valve hemodynamics. Case discussed with Dr. Yolanda Venegas. Results and plans discussed with patient and his wife.      Echo Findings     Left Ventricle  Normal cavity size and systolic function (ejection fraction normal). Mild concentric hypertrophy. The estimated ejection fraction is 55 - 60%.  There is left ventricular diastolic dysfunction. Wall Scoring  The left ventricular wall motion is normal.             Left Atrium  Mildly dilated left atrium. There is no thrombus within the left atrial appendage. There is no mass within the left atrial appendage. No spontaneous echo contrast Appendage velocity is normal (greater than 40 cm/sec). Right Ventricle  Normal cavity size and global systolic function. Right Atrium  Normal cavity size. Interatrial Septum  No PFO noted on color Doppler. Agitated saline contrast study was performed. There was no shunting at baseline. Lipomatous hypertrophy of the interatrial septum. Aortic Valve  No regurgitation. Prosthetic aortic valve. Aortic valve peak gradient is 62.5 mmHg. Aortic valve mean gradient is 35.4 mmHg. Aortic valve area is 1 cm2. Aortic valve peak velocity is 4 cm/s. There is moderate to severe aortic stenosis. Aortic valve AccT 110-120 ms There is a 23 mm [facility] [employer] Trifecta bioprosthetic valve present. LVOT/AV VTI 32.3/90.8 cm  AV Vmax 3.95 m/s  AV MG 35.4 mmHg  AV PG 62.5 mmHg  LVOTd 1.9 cm    TAYLER 1.01 cm2  DI 0.36. Mitral Valve  Normal valve structure and no stenosis. Mild regurgitation. Tricuspid Valve  Normal valve structure and no stenosis. Trace regurgitation. Pulmonic Valve  Normal valve structure, no stenosis and no regurgitation. Aorta  Normal aortic root. Pericardium  Normal pericardium and no evidence of pericardial effusion. L & R Cardiac catheterization: 8/10/20  . No significant change in native CAD with high-grade stenosis in proximal ramus intermedius and sub-totally occluded proximal LAD.     2. Widely patent LIMA-LAD and SVG-ramus intermedius.     3. Moderate/severe bioprosthetic aortic valve stenosis (EOA 1.31-1.40 cm2, EOAi 0.52-0.55 cm2/m2 using BSA 2.52, MG 39 mmHg). No hemodynamically significant aortic regurgitation.   There is likely mild to moderate structural valve degeneration with a component of prosthesis-patient mismatch, leading to a low EOAi.        4. High-normal right-sided filling pressure (RA 12/11/9) with moderately elevated left-sided filling pressures (PCWP 13/13/11; LVEDP 25 mmHg).     5. High-normal pulmonary artery pressures (PA 34/13/22) with normal PVR (1.93 Farah), TPG 11 mmHg.     6. Normal cardiac output (Sariah 5.71 L/min) and cardiac index (Sariah 2.2 L/min/m2).     7. Normal SVR (1205 dynes/sec/cm-5).    8. Results discussed with the patient, the patient's wife and Dr. Diane Hull (the patient's surgeon in 2014 for CABG + AVR).      9. Continue evaluation for possible Marybeth TAVR vs. re-do bioprosthetic AVR with root enlargement vs. redo mechanical AVR.           Complications     Complications documented before study signed (8/10/2020 11:21 PM)      No complications were associated with this study. Documented by Marshal Jeans, MD - 8/10/2020 10:55 PM       Coronary Findings     Diagnostic   Dominance: Right   Left Anterior Descending    Prox LAD lesion, 100% stenosed. Ramus Intermedius    Ramus-1 lesion, 85% stenosed. Ramus-2 lesion, 80% stenosed. Right Coronary Artery    The vessel exhibits minimal luminal irregularities. LIMA Graft to Mid LAD    Graft to Ramus    Intervention     No interventions have been documented. Left Heart     Left Ventricle  LV EDP is: 25. Right Heart     Right Heart Cath  PA pressure = 34/14 mmHg. PA mean = 22 mmHg. PA Sat = 78.5 %. Mid RA pressure = 12/12 mmHg. RV pressure = 41/8 mmHg. RV mean = 13 mmHg. Wedge pressure = 11 mmHg. AO Sat = 99 %. Sariah CO = 5.86 L/min.    Sariah CO 5.86  Sariah CI 2.33    Sariah CO #2 5.71  Sariah CI #2 2.27    Hemo Data Vitals - VO2 Value: 314.76 ml/min  Hb: 19 %   Blood Oximetry Information - AV Hb PV: 0 gm/dL   Pressure Phase - Phase: Phase: Baseline   AO Pressures - AO Systolic: 743 mmHg  AO Diastolic: 72 mmHg  AO Mean: 95 mmHg  Heart Rate: 54 bpm   PA Pressures - PA Systolic: 34 mmHg  PA Diastolic: 14 mmHg  PA Mean: 22 mmHg   RV Pressures - RV Systolic: 41 mmHg  RV End Diastolic: 13 mmHg  RV dP/dt: 480 mmHg/sec   LV Pressures - LV Systolic: 739 mmHg  LV End Diastolic: 25 mmHg LV dP/dt: 2496 mmHg/sec   RA Pressures - RA Wedge A Wave: 11 mmHg  RA Wedge V Wave: 12 mmHg  RA Wedge Mean: 9 mmHg   PCW Pressures - PCW A Wave: 13 mmHg  PCW V Wave: 13 mmHg  PCW Mean: 11 mmHg   Atrial Wedge Pressures - Source: Measured  RA Atrial Wedge Heart Rate: 65 bpm   Cath Pressure - FA End Diastolic Cath Pressure: 54 mmHg  PCW Source: Measured  PCW Heart Rate: 58 bpm   Cardiac Output Results - Sariah C.O.: 5.86 L/min  Sariah C. I.: 2.33 L/min/m2  QPI: 2.33 L/min/m2  QSI Value: 2.33 L/min/m2   Resistance Results - PVR: 150.23 dsc-5  PVR-I: 378.29 dsc-5/m2  SVR: 1174.49 dsc-5  SVR-I: 2957.51 dsc-5/m2  PVR/SVR: 0.13  TPR: 300.45 dsc-5  TPR-I: 756.57 dsc-5/m2  TVR: 1297.4 dsc-5  TVR-I: 3267.01 dsc-5/m2  TPR/TVR: 0.23   Aortic Valve Results - AV SEP: 15.2 sec/beat  AV Area: 1.4 cm2  AV Flow: 385.25 cc  AV Index: 0.55 cm2/m2  AV Gradient: 38.75 mmHg   Stroke Work Results - LVSW: 123.9 gm*m  LVSW-I: 49.2 gm*m/m2  RVSW: 17.55 gm*m  RVSW-I: 6.97 gm*m/m2   Valve Results - AV SEP: 15.2 sec/beat  AV Flow: 385.25 cc  AV Area: 1.4 cm2  AV Index: 0.55 cm2/m2  Aortic Valve HR: 55 bpm  AV Gradient: 38.75 mmHg  TPR/TVR: 0.23  PVR/SVR: 0.13          Carotid Dopplers: 12/2019 no ICA stenosis bilat    PFTs: FEV1/Predicted:  n/a  Normal FEV1 > 1 Liter, Predicted = 100%, DLCO > 80%    Mild Lung Disease (60-70% Predicted)    Moderate Lung Disease (50-55% Predicted)    Severe Lung Disease (< 50% Predicted or PO2 < 60 or pCO2>50 on RA)    Gated C/A/P CTA: reviewed by surgeon    Physical Exam:  General: Well nourished well groomed man appearing stated age accompanied by wife  Neuro: A&OX3. ROCKWELL. PERRL. Steady un-assisted gait  Head:Normocephalic. Atraumatic. Symmetrical  Neck: Trachea Midline  Resp: CTA B. No Adv BS/cough/sputum/tachypnea with seated conversation  CV: S1S2 RRR. TANVI V/VI.  No JVD/carotid bruits. Pink/warm/dry extremities. NO LE peripheral edema  GI:Benign ab. Soft. NT/ND. Active BS  : Voids  Integ: No obvious s/s of infection or breakdown  Musculo/Skeletal: FROM in all major joints. Normal muscle tone    Clinic Evaluation:   KCCQ-12: scanned into EMR    5 meter gait: 10 seconds    Frality Survey:  Rosalita Sierras Index ADL - 6/6  scanned into EMR     STS 2.9 Risk Score / Predicted 30 day mortality: - calculations scanned into EMR -- AVR Redo  Risk of Mortality:4.849%  Renal Failure:18.335%  Permanent Stroke:0.806%  Prolonged Ventilation:25.083%  DSW Infection:0.581%  Reoperation:6.135%  Morbidity or Mortality:38.279%  Short Length of Stay:20.178%  Long Length of Stay:11.959%      Assessment/Plan:   1. Mod to severe bioprosthetic AV stenosis:  Not good TAVR candidate d/t risk for coronary obstruction. Needs redo AVR. On lasix PRN. 2. CAD S/p CABG 2014: on statin, no BB historically   3. DM2: On insulin 75/25, actos. Check Hgba1c w/ PAT. Recent HgbA1c 8.8 in 7/2020. Would need to be admitted 24 hrs preop for insulin gtt if A1C >8.0.    4. Chronic Diastolic CHF  5. HTN: on norvasc, cozaar. 6. HLD: on statin  7. ANDRY: uses cpap  8. CKD3: followed by Dr. Christopher Blount. Recent Creat 2.35.   9. BPH: not on meds currently  10. A flutter: pt stopped eliquis on his own after few days. Taking 162 mg aspirin currently. Addendum:    Saw patient. History and films reviewed. Risks and benefits explained. Agrees with surgery. Patient seen by PA/NP and agree with above.    Findings/Conditions: aortic stenosis; prosthetic valve failure  Plan of Care: redo AVR    Risk of morbidity and mortality - high  Medical decision making - high complexity

## 2020-09-15 NOTE — PROGRESS NOTES
Saw patient. History and films reviewed. Risks and benefits explained. Agrees with surgery. Patient seen by PA/NP and agree with above.    Findings/Conditions: aortic stenosis / prosthetic valve failure  Plan of Care: redo avr    Risk of morbidity and mortality - high  Medical decision making - high complexity

## 2020-09-18 ENCOUNTER — HOSPITAL ENCOUNTER (OUTPATIENT)
Dept: PREADMISSION TESTING | Age: 70
Discharge: HOME | DRG: 220 | End: 2020-09-18
Payer: MEDICARE

## 2020-09-18 DIAGNOSIS — Z01.812 PRE-PROCEDURE LAB EXAM: ICD-10-CM

## 2020-09-18 PROCEDURE — 87635 SARS-COV-2 COVID-19 AMP PRB: CPT

## 2020-09-20 LAB — SARS-COV-2, COV2NT: NOT DETECTED

## 2020-09-21 ENCOUNTER — APPOINTMENT (OUTPATIENT)
Dept: VASCULAR SURGERY | Age: 70
DRG: 220 | End: 2020-09-21
Attending: NURSE PRACTITIONER
Payer: MEDICARE

## 2020-09-21 ENCOUNTER — HOSPITAL ENCOUNTER (INPATIENT)
Age: 70
LOS: 8 days | Discharge: HOME HEALTH | DRG: 220 | End: 2020-09-29
Attending: THORACIC SURGERY (CARDIOTHORACIC VASCULAR SURGERY) | Admitting: THORACIC SURGERY (CARDIOTHORACIC VASCULAR SURGERY)
Payer: MEDICARE

## 2020-09-21 ENCOUNTER — APPOINTMENT (OUTPATIENT)
Dept: GENERAL RADIOLOGY | Age: 70
DRG: 220 | End: 2020-09-21
Attending: NURSE PRACTITIONER
Payer: MEDICARE

## 2020-09-21 ENCOUNTER — ANESTHESIA EVENT (OUTPATIENT)
Dept: CARDIOTHORACIC SURGERY | Age: 70
DRG: 220 | End: 2020-09-21
Payer: MEDICARE

## 2020-09-21 DIAGNOSIS — Z95.2 S/P AVR (AORTIC VALVE REPLACEMENT) AND AORTOPLASTY: ICD-10-CM

## 2020-09-21 DIAGNOSIS — N20.0 KIDNEY STONE: ICD-10-CM

## 2020-09-21 DIAGNOSIS — E11.69 TYPE 2 DIABETES MELLITUS WITH OTHER SPECIFIED COMPLICATION, WITH LONG-TERM CURRENT USE OF INSULIN (HCC): ICD-10-CM

## 2020-09-21 DIAGNOSIS — E11.21 TYPE 2 DIABETES WITH NEPHROPATHY (HCC): ICD-10-CM

## 2020-09-21 DIAGNOSIS — E55.9 HYPOVITAMINOSIS D: ICD-10-CM

## 2020-09-21 DIAGNOSIS — I35.0 AORTIC VALVE STENOSIS, SEVERE: ICD-10-CM

## 2020-09-21 DIAGNOSIS — M54.50: ICD-10-CM

## 2020-09-21 DIAGNOSIS — E66.01 SEVERE OBESITY (BMI 35.0-39.9) WITH COMORBIDITY (HCC): ICD-10-CM

## 2020-09-21 DIAGNOSIS — N18.30 CKD (CHRONIC KIDNEY DISEASE), STAGE III (HCC): ICD-10-CM

## 2020-09-21 DIAGNOSIS — Z79.4 TYPE 2 DIABETES MELLITUS WITHOUT COMPLICATION, WITH LONG-TERM CURRENT USE OF INSULIN (HCC): ICD-10-CM

## 2020-09-21 DIAGNOSIS — Z95.1 S/P CABG X 2: ICD-10-CM

## 2020-09-21 DIAGNOSIS — Z91.199 NONADHERENCE TO MEDICAL TREATMENT: Chronic | ICD-10-CM

## 2020-09-21 DIAGNOSIS — Z95.2 S/P AVR (AORTIC VALVE REPLACEMENT): ICD-10-CM

## 2020-09-21 DIAGNOSIS — E11.9 TYPE 2 DIABETES MELLITUS WITHOUT COMPLICATION, WITH LONG-TERM CURRENT USE OF INSULIN (HCC): ICD-10-CM

## 2020-09-21 DIAGNOSIS — Z79.4 TYPE 2 DIABETES MELLITUS WITH OTHER SPECIFIED COMPLICATION, WITH LONG-TERM CURRENT USE OF INSULIN (HCC): ICD-10-CM

## 2020-09-21 DIAGNOSIS — I35.0 NONRHEUMATIC AORTIC VALVE STENOSIS: ICD-10-CM

## 2020-09-21 LAB
ADMINISTERED INITIALS, ADMINIT: NORMAL
ALBUMIN SERPL-MCNC: 3.3 G/DL (ref 3.5–5)
ALBUMIN/GLOB SERPL: 0.9 {RATIO} (ref 1.1–2.2)
ALP SERPL-CCNC: 62 U/L (ref 45–117)
ALT SERPL-CCNC: 24 U/L (ref 12–78)
ANION GAP SERPL CALC-SCNC: 7 MMOL/L (ref 5–15)
APPEARANCE UR: CLEAR
APTT PPP: 28.7 SEC (ref 22.1–32)
ARTERIAL PATENCY WRIST A: YES
AST SERPL-CCNC: 18 U/L (ref 15–37)
BACTERIA URNS QL MICRO: NEGATIVE /HPF
BASE DEFICIT BLD-SCNC: 2 MMOL/L
BASOPHILS # BLD: 0 K/UL (ref 0–0.1)
BASOPHILS NFR BLD: 1 % (ref 0–1)
BDY SITE: ABNORMAL
BILIRUB SERPL-MCNC: 0.6 MG/DL (ref 0.2–1)
BILIRUB UR QL: NEGATIVE
BNP SERPL-MCNC: 304 PG/ML
BUN SERPL-MCNC: 25 MG/DL (ref 6–20)
BUN/CREAT SERPL: 12 (ref 12–20)
CA-I BLD-SCNC: 1.34 MMOL/L (ref 1.12–1.32)
CALCIUM SERPL-MCNC: 9.1 MG/DL (ref 8.5–10.1)
CHLORIDE SERPL-SCNC: 108 MMOL/L (ref 97–108)
CO2 SERPL-SCNC: 22 MMOL/L (ref 21–32)
COLOR UR: ABNORMAL
CREAT SERPL-MCNC: 2.01 MG/DL (ref 0.7–1.3)
D50 ADMINISTERED, D50ADM: 0 ML
D50 ORDER, D50ORD: 0 ML
DIFFERENTIAL METHOD BLD: ABNORMAL
EOSINOPHIL # BLD: 0.3 K/UL (ref 0–0.4)
EOSINOPHIL NFR BLD: 3 % (ref 0–7)
EPITH CASTS URNS QL MICRO: ABNORMAL /LPF
ERYTHROCYTE [DISTWIDTH] IN BLOOD BY AUTOMATED COUNT: 13.7 % (ref 11.5–14.5)
EST. AVERAGE GLUCOSE BLD GHB EST-MCNC: 200 MG/DL
GAS FLOW.O2 O2 DELIVERY SYS: ABNORMAL L/MIN
GLOBULIN SER CALC-MCNC: 3.7 G/DL (ref 2–4)
GLSCOM COMMENTS: NORMAL
GLUCOSE BLD STRIP.AUTO-MCNC: 106 MG/DL (ref 65–100)
GLUCOSE BLD STRIP.AUTO-MCNC: 108 MG/DL (ref 65–100)
GLUCOSE BLD STRIP.AUTO-MCNC: 113 MG/DL (ref 65–100)
GLUCOSE BLD STRIP.AUTO-MCNC: 116 MG/DL (ref 65–100)
GLUCOSE BLD STRIP.AUTO-MCNC: 117 MG/DL (ref 65–100)
GLUCOSE BLD STRIP.AUTO-MCNC: 117 MG/DL (ref 65–100)
GLUCOSE BLD STRIP.AUTO-MCNC: 120 MG/DL (ref 65–100)
GLUCOSE BLD STRIP.AUTO-MCNC: 147 MG/DL (ref 65–100)
GLUCOSE BLD STRIP.AUTO-MCNC: 148 MG/DL (ref 65–100)
GLUCOSE BLD STRIP.AUTO-MCNC: 81 MG/DL (ref 65–100)
GLUCOSE BLD STRIP.AUTO-MCNC: 89 MG/DL (ref 65–100)
GLUCOSE BLD STRIP.AUTO-MCNC: 90 MG/DL (ref 65–100)
GLUCOSE SERPL-MCNC: 118 MG/DL (ref 65–100)
GLUCOSE UR STRIP.AUTO-MCNC: NEGATIVE MG/DL
GLUCOSE, GLC: 106 MG/DL
GLUCOSE, GLC: 108 MG/DL
GLUCOSE, GLC: 113 MG/DL
GLUCOSE, GLC: 116 MG/DL
GLUCOSE, GLC: 117 MG/DL
GLUCOSE, GLC: 117 MG/DL
GLUCOSE, GLC: 120 MG/DL
GLUCOSE, GLC: 147 MG/DL
GLUCOSE, GLC: 148 MG/DL
GLUCOSE, GLC: 89 MG/DL
GLUCOSE, GLC: 90 MG/DL
HBA1C MFR BLD: 8.6 % (ref 4–5.6)
HCO3 BLD-SCNC: 23 MMOL/L (ref 22–26)
HCT VFR BLD AUTO: 48.2 % (ref 36.6–50.3)
HGB BLD-MCNC: 16.2 G/DL (ref 12.1–17)
HGB UR QL STRIP: NEGATIVE
HIGH TARGET, HITG: 130 MG/DL
HISTORY CHECKED?,CKHIST: NORMAL
HYALINE CASTS URNS QL MICRO: ABNORMAL /LPF (ref 0–5)
IMM GRANULOCYTES # BLD AUTO: 0 K/UL (ref 0–0.04)
IMM GRANULOCYTES NFR BLD AUTO: 1 % (ref 0–0.5)
INR PPP: 1 (ref 0.9–1.1)
INSULIN ADMINSTERED, INSADM: 0.6 UNITS/HOUR
INSULIN ADMINSTERED, INSADM: 0.9 UNITS/HOUR
INSULIN ADMINSTERED, INSADM: 0.9 UNITS/HOUR
INSULIN ADMINSTERED, INSADM: 1 UNITS/HOUR
INSULIN ADMINSTERED, INSADM: 1.1 UNITS/HOUR
INSULIN ADMINSTERED, INSADM: 1.2 UNITS/HOUR
INSULIN ADMINSTERED, INSADM: 2.3 UNITS/HOUR
INSULIN ADMINSTERED, INSADM: 2.6 UNITS/HOUR
INSULIN ADMINSTERED, INSADM: 3.5 UNITS/HOUR
INSULIN ORDER, INSORD: 0.6 UNITS/HOUR
INSULIN ORDER, INSORD: 0.9 UNITS/HOUR
INSULIN ORDER, INSORD: 0.9 UNITS/HOUR
INSULIN ORDER, INSORD: 1 UNITS/HOUR
INSULIN ORDER, INSORD: 1.1 UNITS/HOUR
INSULIN ORDER, INSORD: 1.2 UNITS/HOUR
INSULIN ORDER, INSORD: 2.3 UNITS/HOUR
INSULIN ORDER, INSORD: 2.6 UNITS/HOUR
INSULIN ORDER, INSORD: 3.5 UNITS/HOUR
KETONES UR QL STRIP.AUTO: NEGATIVE MG/DL
LEFT ABI: 1.13
LEFT ANTERIOR TIBIAL: 168 MMHG
LEFT ARM BP: 139 MMHG
LEFT ARM BP: 139 MMHG
LEFT CCA DIST DIAS: 7.8 CM/S
LEFT CCA DIST SYS: 85.3 CM/S
LEFT CCA PROX DIAS: 10.7 CM/S
LEFT CCA PROX SYS: 92.8 CM/S
LEFT ECA DIAS: 5.82 CM/S
LEFT ECA SYS: 111.3 CM/S
LEFT ICA DIST DIAS: 12.7 CM/S
LEFT ICA DIST SYS: 70.3 CM/S
LEFT ICA MID DIAS: 15.9 CM/S
LEFT ICA MID SYS: 61.4 CM/S
LEFT ICA PROX DIAS: 12.4 CM/S
LEFT ICA PROX SYS: 97.2 CM/S
LEFT ICA/CCA SYS: 1.14
LEFT POSTERIOR TIBIAL: 167 MMHG
LEFT VERTEBRAL DIAS: 6.7 CM/S
LEFT VERTEBRAL SYS: 30.3 CM/S
LEUKOCYTE ESTERASE UR QL STRIP.AUTO: NEGATIVE
LOW TARGET, LOT: 95 MG/DL
LYMPHOCYTES # BLD: 1.7 K/UL (ref 0.8–3.5)
LYMPHOCYTES NFR BLD: 19 % (ref 12–49)
MAGNESIUM SERPL-MCNC: 2.1 MG/DL (ref 1.6–2.4)
MCH RBC QN AUTO: 30.3 PG (ref 26–34)
MCHC RBC AUTO-ENTMCNC: 33.6 G/DL (ref 30–36.5)
MCV RBC AUTO: 90.1 FL (ref 80–99)
MINUTES UNTIL NEXT BG, NBG: 120 MIN
MINUTES UNTIL NEXT BG, NBG: 60 MIN
MONOCYTES # BLD: 0.7 K/UL (ref 0–1)
MONOCYTES NFR BLD: 8 % (ref 5–13)
MULTIPLIER, MUL: 0.02
MULTIPLIER, MUL: 0.03
MULTIPLIER, MUL: 0.03
MULTIPLIER, MUL: 0.04
MULTIPLIER, MUL: 0.04
NEUTS SEG # BLD: 6.1 K/UL (ref 1.8–8)
NEUTS SEG NFR BLD: 68 % (ref 32–75)
NITRITE UR QL STRIP.AUTO: NEGATIVE
NRBC # BLD: 0 K/UL (ref 0–0.01)
NRBC BLD-RTO: 0 PER 100 WBC
O2/TOTAL GAS SETTING VFR VENT: 0.21 %
ORDER INITIALS, ORDINIT: NORMAL
PCO2 BLD: 40 MMHG (ref 35–45)
PH BLD: 7.37 [PH] (ref 7.35–7.45)
PH UR STRIP: 5.5 [PH] (ref 5–8)
PLATELET # BLD AUTO: 146 K/UL (ref 150–400)
PMV BLD AUTO: 10.1 FL (ref 8.9–12.9)
PO2 BLD: 90 MMHG (ref 80–100)
POTASSIUM SERPL-SCNC: 3.8 MMOL/L (ref 3.5–5.1)
PROT SERPL-MCNC: 7 G/DL (ref 6.4–8.2)
PROT UR STRIP-MCNC: 30 MG/DL
PROTHROMBIN TIME: 10.8 SEC (ref 9–11.1)
RBC # BLD AUTO: 5.35 M/UL (ref 4.1–5.7)
RBC #/AREA URNS HPF: ABNORMAL /HPF (ref 0–5)
RIGHT ABI: 1.14
RIGHT ANTERIOR TIBIAL: 164 MMHG
RIGHT ARM BP: 149 MMHG
RIGHT ARM BP: 149 MMHG
RIGHT CCA DIST DIAS: 12.1 CM/S
RIGHT CCA DIST SYS: 95 CM/S
RIGHT CCA PROX DIAS: 12.1 CM/S
RIGHT CCA PROX SYS: 79 CM/S
RIGHT ECA DIAS: 9.17 CM/S
RIGHT ECA SYS: 135.8 CM/S
RIGHT ICA DIST DIAS: 18.1 CM/S
RIGHT ICA DIST SYS: 55.8 CM/S
RIGHT ICA MID DIAS: 22.1 CM/S
RIGHT ICA MID SYS: 79.6 CM/S
RIGHT ICA PROX DIAS: 13.4 CM/S
RIGHT ICA PROX SYS: 55.2 CM/S
RIGHT ICA/CCA SYS: 0.8
RIGHT POSTERIOR TIBIAL: 170 MMHG
RIGHT VERTEBRAL DIAS: 5.91 CM/S
RIGHT VERTEBRAL SYS: 38.7 CM/S
SAO2 % BLD: 97 % (ref 92–97)
SERVICE CMNT-IMP: ABNORMAL
SERVICE CMNT-IMP: NORMAL
SODIUM SERPL-SCNC: 137 MMOL/L (ref 136–145)
SP GR UR REFRACTOMETRY: 1.02 (ref 1–1.03)
SPECIMEN TYPE: ABNORMAL
THERAPEUTIC RANGE,PTTT: NORMAL SECS (ref 58–77)
TOTAL RESP. RATE, ITRR: 20
TSH SERPL DL<=0.05 MIU/L-ACNC: 4.27 UIU/ML (ref 0.36–3.74)
UA: UC IF INDICATED,UAUC: ABNORMAL
UROBILINOGEN UR QL STRIP.AUTO: 0.2 EU/DL (ref 0.2–1)
WBC # BLD AUTO: 8.7 K/UL (ref 4.1–11.1)
WBC URNS QL MICRO: ABNORMAL /HPF (ref 0–4)

## 2020-09-21 PROCEDURE — 85610 PROTHROMBIN TIME: CPT

## 2020-09-21 PROCEDURE — 93005 ELECTROCARDIOGRAM TRACING: CPT

## 2020-09-21 PROCEDURE — 85730 THROMBOPLASTIN TIME PARTIAL: CPT

## 2020-09-21 PROCEDURE — 85025 COMPLETE CBC W/AUTO DIFF WBC: CPT

## 2020-09-21 PROCEDURE — 36600 WITHDRAWAL OF ARTERIAL BLOOD: CPT

## 2020-09-21 PROCEDURE — 84443 ASSAY THYROID STIM HORMONE: CPT

## 2020-09-21 PROCEDURE — 83036 HEMOGLOBIN GLYCOSYLATED A1C: CPT

## 2020-09-21 PROCEDURE — 71046 X-RAY EXAM CHEST 2 VIEWS: CPT

## 2020-09-21 PROCEDURE — 74011636637 HC RX REV CODE- 636/637: Performed by: NURSE PRACTITIONER

## 2020-09-21 PROCEDURE — 81001 URINALYSIS AUTO W/SCOPE: CPT

## 2020-09-21 PROCEDURE — 99232 SBSQ HOSP IP/OBS MODERATE 35: CPT | Performed by: CLINICAL NURSE SPECIALIST

## 2020-09-21 PROCEDURE — 36415 COLL VENOUS BLD VENIPUNCTURE: CPT

## 2020-09-21 PROCEDURE — 77030027138 HC INCENT SPIROMETER -A

## 2020-09-21 PROCEDURE — 93880 EXTRACRANIAL BILAT STUDY: CPT

## 2020-09-21 PROCEDURE — 93922 UPR/L XTREMITY ART 2 LEVELS: CPT

## 2020-09-21 PROCEDURE — 80053 COMPREHEN METABOLIC PANEL: CPT

## 2020-09-21 PROCEDURE — 94010 BREATHING CAPACITY TEST: CPT

## 2020-09-21 PROCEDURE — 65660000000 HC RM CCU STEPDOWN

## 2020-09-21 PROCEDURE — 74011000250 HC RX REV CODE- 250: Performed by: NURSE PRACTITIONER

## 2020-09-21 PROCEDURE — 74011000258 HC RX REV CODE- 258: Performed by: NURSE PRACTITIONER

## 2020-09-21 PROCEDURE — 83880 ASSAY OF NATRIURETIC PEPTIDE: CPT

## 2020-09-21 PROCEDURE — 86923 COMPATIBILITY TEST ELECTRIC: CPT

## 2020-09-21 PROCEDURE — 83735 ASSAY OF MAGNESIUM: CPT

## 2020-09-21 PROCEDURE — 82803 BLOOD GASES ANY COMBINATION: CPT

## 2020-09-21 PROCEDURE — 86900 BLOOD TYPING SEROLOGIC ABO: CPT

## 2020-09-21 PROCEDURE — 74011250637 HC RX REV CODE- 250/637: Performed by: NURSE PRACTITIONER

## 2020-09-21 PROCEDURE — 82962 GLUCOSE BLOOD TEST: CPT

## 2020-09-21 PROCEDURE — 99223 1ST HOSP IP/OBS HIGH 75: CPT | Performed by: THORACIC SURGERY (CARDIOTHORACIC VASCULAR SURGERY)

## 2020-09-21 RX ORDER — SODIUM CHLORIDE 0.9 % (FLUSH) 0.9 %
5-40 SYRINGE (ML) INJECTION EVERY 8 HOURS
Status: DISCONTINUED | OUTPATIENT
Start: 2020-09-21 | End: 2020-09-22 | Stop reason: HOSPADM

## 2020-09-21 RX ORDER — DIPHENHYDRAMINE HYDROCHLORIDE 50 MG/ML
12.5 INJECTION, SOLUTION INTRAMUSCULAR; INTRAVENOUS AS NEEDED
Status: CANCELLED | OUTPATIENT
Start: 2020-09-21 | End: 2020-09-21

## 2020-09-21 RX ORDER — INSULIN LISPRO 100 [IU]/ML
INJECTION, SOLUTION INTRAVENOUS; SUBCUTANEOUS
Status: DISCONTINUED | OUTPATIENT
Start: 2020-09-21 | End: 2020-09-22

## 2020-09-21 RX ORDER — ONDANSETRON 2 MG/ML
4 INJECTION INTRAMUSCULAR; INTRAVENOUS AS NEEDED
Status: CANCELLED | OUTPATIENT
Start: 2020-09-21

## 2020-09-21 RX ORDER — CHLORHEXIDINE GLUCONATE 1.2 MG/ML
15 RINSE ORAL EVERY 12 HOURS
Status: DISCONTINUED | OUTPATIENT
Start: 2020-09-21 | End: 2020-09-22

## 2020-09-21 RX ORDER — MAGNESIUM SULFATE 100 %
4 CRYSTALS MISCELLANEOUS AS NEEDED
Status: DISCONTINUED | OUTPATIENT
Start: 2020-09-21 | End: 2020-09-22

## 2020-09-21 RX ORDER — SODIUM CHLORIDE 0.9 % (FLUSH) 0.9 %
5-40 SYRINGE (ML) INJECTION AS NEEDED
Status: DISCONTINUED | OUTPATIENT
Start: 2020-09-21 | End: 2020-09-22 | Stop reason: HOSPADM

## 2020-09-21 RX ORDER — MAGNESIUM SULFATE HEPTAHYDRATE 40 MG/ML
2 INJECTION, SOLUTION INTRAVENOUS ONCE
Status: DISCONTINUED | OUTPATIENT
Start: 2020-09-22 | End: 2020-09-22 | Stop reason: HOSPADM

## 2020-09-21 RX ORDER — AMIODARONE HYDROCHLORIDE 200 MG/1
400 TABLET ORAL EVERY 12 HOURS
Status: DISCONTINUED | OUTPATIENT
Start: 2020-09-21 | End: 2020-09-22 | Stop reason: HOSPADM

## 2020-09-21 RX ORDER — CEFAZOLIN SODIUM/WATER 2 G/20 ML
2 SYRINGE (ML) INTRAVENOUS
Status: DISCONTINUED | OUTPATIENT
Start: 2020-09-22 | End: 2020-09-22 | Stop reason: HOSPADM

## 2020-09-21 RX ORDER — ALBUMIN HUMAN 50 G/1000ML
25 SOLUTION INTRAVENOUS ONCE
Status: DISCONTINUED | OUTPATIENT
Start: 2020-09-22 | End: 2020-09-22 | Stop reason: HOSPADM

## 2020-09-21 RX ORDER — AMLODIPINE BESYLATE 5 MG/1
5 TABLET ORAL DAILY
Status: DISCONTINUED | OUTPATIENT
Start: 2020-09-22 | End: 2020-09-22

## 2020-09-21 RX ORDER — FENTANYL CITRATE 50 UG/ML
25 INJECTION, SOLUTION INTRAMUSCULAR; INTRAVENOUS
Status: CANCELLED | OUTPATIENT
Start: 2020-09-21

## 2020-09-21 RX ORDER — DEXTROSE MONOHYDRATE 100 MG/ML
0-250 INJECTION, SOLUTION INTRAVENOUS AS NEEDED
Status: DISCONTINUED | OUTPATIENT
Start: 2020-09-21 | End: 2020-09-22

## 2020-09-21 RX ORDER — SODIUM CHLORIDE 0.9 % (FLUSH) 0.9 %
5-40 SYRINGE (ML) INJECTION EVERY 8 HOURS
Status: CANCELLED | OUTPATIENT
Start: 2020-09-21

## 2020-09-21 RX ORDER — DOBUTAMINE HYDROCHLORIDE 200 MG/100ML
0-10 INJECTION INTRAVENOUS
Status: DISCONTINUED | OUTPATIENT
Start: 2020-09-22 | End: 2020-09-24

## 2020-09-21 RX ORDER — HYDROMORPHONE HYDROCHLORIDE 1 MG/ML
0.2 INJECTION, SOLUTION INTRAMUSCULAR; INTRAVENOUS; SUBCUTANEOUS
Status: CANCELLED | OUTPATIENT
Start: 2020-09-21

## 2020-09-21 RX ORDER — HEPARIN SOD,PORCINE/0.9 % NACL 30K/1000ML
50-1000 INTRAVENOUS SOLUTION INTRAVENOUS AS NEEDED
Status: DISCONTINUED | OUTPATIENT
Start: 2020-09-22 | End: 2020-09-22 | Stop reason: HOSPADM

## 2020-09-21 RX ORDER — MIDAZOLAM HYDROCHLORIDE 1 MG/ML
0.5 INJECTION, SOLUTION INTRAMUSCULAR; INTRAVENOUS
Status: CANCELLED | OUTPATIENT
Start: 2020-09-21

## 2020-09-21 RX ORDER — PROTAMINE SULFATE 10 MG/ML
500 INJECTION, SOLUTION INTRAVENOUS ONCE
Status: DISCONTINUED | OUTPATIENT
Start: 2020-09-22 | End: 2020-09-22 | Stop reason: HOSPADM

## 2020-09-21 RX ORDER — POTASSIUM CHLORIDE 29.8 MG/ML
20 INJECTION INTRAVENOUS ONCE
Status: DISCONTINUED | OUTPATIENT
Start: 2020-09-22 | End: 2020-09-22 | Stop reason: HOSPADM

## 2020-09-21 RX ORDER — MORPHINE SULFATE 10 MG/ML
2 INJECTION, SOLUTION INTRAMUSCULAR; INTRAVENOUS
Status: CANCELLED | OUTPATIENT
Start: 2020-09-21

## 2020-09-21 RX ORDER — NITROGLYCERIN 20 MG/100ML
16.5 INJECTION INTRAVENOUS CONTINUOUS
Status: DISCONTINUED | OUTPATIENT
Start: 2020-09-22 | End: 2020-09-22

## 2020-09-21 RX ORDER — SODIUM CHLORIDE 0.9 % (FLUSH) 0.9 %
5-40 SYRINGE (ML) INJECTION AS NEEDED
Status: CANCELLED | OUTPATIENT
Start: 2020-09-21

## 2020-09-21 RX ORDER — OXYCODONE HYDROCHLORIDE 5 MG/1
5 TABLET ORAL AS NEEDED
Status: CANCELLED | OUTPATIENT
Start: 2020-09-21

## 2020-09-21 RX ORDER — SODIUM CHLORIDE, SODIUM LACTATE, POTASSIUM CHLORIDE, CALCIUM CHLORIDE 600; 310; 30; 20 MG/100ML; MG/100ML; MG/100ML; MG/100ML
125 INJECTION, SOLUTION INTRAVENOUS CONTINUOUS
Status: CANCELLED | OUTPATIENT
Start: 2020-09-21

## 2020-09-21 RX ORDER — ATORVASTATIN CALCIUM 40 MG/1
40 TABLET, FILM COATED ORAL DAILY
Status: DISCONTINUED | OUTPATIENT
Start: 2020-09-21 | End: 2020-09-29 | Stop reason: HOSPADM

## 2020-09-21 RX ORDER — MUPIROCIN 20 MG/G
OINTMENT TOPICAL 2 TIMES DAILY
Status: DISCONTINUED | OUTPATIENT
Start: 2020-09-21 | End: 2020-09-22

## 2020-09-21 RX ADMIN — MUPIROCIN: 20 OINTMENT TOPICAL at 19:16

## 2020-09-21 RX ADMIN — SODIUM CHLORIDE 2.6 UNITS/HR: 9 INJECTION, SOLUTION INTRAVENOUS at 12:46

## 2020-09-21 RX ADMIN — AMIODARONE HYDROCHLORIDE 400 MG: 200 TABLET ORAL at 10:56

## 2020-09-21 RX ADMIN — Medication 10 ML: at 10:58

## 2020-09-21 RX ADMIN — AMIODARONE HYDROCHLORIDE 400 MG: 200 TABLET ORAL at 20:21

## 2020-09-21 RX ADMIN — ATORVASTATIN CALCIUM 40 MG: 40 TABLET, FILM COATED ORAL at 10:56

## 2020-09-21 RX ADMIN — MUPIROCIN: 20 OINTMENT TOPICAL at 10:56

## 2020-09-21 RX ADMIN — CHLORHEXIDINE GLUCONATE 15 ML: 1.2 RINSE ORAL at 10:56

## 2020-09-21 RX ADMIN — CHLORHEXIDINE GLUCONATE 15 ML: 1.2 RINSE ORAL at 20:21

## 2020-09-21 RX ADMIN — Medication 10 ML: at 14:50

## 2020-09-21 NOTE — PROGRESS NOTES
Reason for Admission:  Patient admitted for AVR Redo with Cardiac Surgery                     RUR Score: 6% risk of re-admission                    Plan for utilizing home health:  Anticipate home health needs s/p         PCP: First and Last name:  Dr. Purnima Hollins    [facility] of [facility]    Are you a current patient: Yes/No: Yes   Approximate date of last visit: Within the last month    Can you participate in a virtual visit with your PCP: N/A                    Current Advanced Directive/Advance Care Plan: Full Code, no AMD on file, legal NOK is patient's spouse                          Transition of Care Plan:  Home with home health           The CM met with the patient at bedside in order to introduce the role of CM and assess for patient needs. The patient lives in [address] style home with his wife, verified demographics and insurance information. The patient is independent with ADLs and mobility at baseline, only DME in the home is home CPAP machine. The patient denied difficulty accessing medications- utilizes [facility] mail order for long-term prescriptions, short-term the patient utilizes [facility] in [place]. The patient endorses family will transport home. Anticipate discharge home with home health services, PT/OT s/p AVR. The CM will follow for transitions of care needs. ADRIANNE Rodriguez     Care Management Interventions  PCP Verified by CM: Yes(Patient verified PCP as Dr. Purnima Hollins )  Mode of Transport at Discharge:  Other (see comment)(Family to transport home )  Transition of Care Consult (CM Consult): Discharge Planning  MyChart Signup: No  Physical Therapy Consult: No(PT evaluation s/p cardiac surgery intervention )  Occupational Therapy Consult: No(OT evaluation s/p cardiac intervention )  Current Support Network: Own Home, Lives with Spouse  Confirm Follow Up Transport: Family  Discharge Location  Discharge Placement: Home with home health

## 2020-09-21 NOTE — PROGRESS NOTES
[address] visit. Unable to visit with . Nurse was working with Mr. Park Hansen. Prayer for spiritual communion offered. Will plan to visit on Wednesday.     ESVIN Patterson, RN, ACSW, LCSW   Page:  [phone]

## 2020-09-21 NOTE — CONSULTS
OSCAR CROWE  CLINICAL NURSE SPECIALIST CONSULT  [facility]    INITIAL NOTE    Presentation   Nikki Parmar is a 78 y.o. male who presented to the [facility] for evaluation of moderate to severe aortic valve stenosis with progressive c/o fatigue, dyspnea, weakness with 20-30 ln weight gain. HX: CAD s/o CABG x2 and AVR (2014), CKD, CHF, HTN, GERD, A flutter, DM2, partial colectomy, ANDRY, BPH, HLD    DX: Severe aortic stenosis    TX: aortic valve repair    Current clinical course has been uncomplicated. Diabetes: Patient has known Type two diabetes, treated with Humalog Mix 75/25 PTA. Consulted by Provider for advanced diabetes nursing assessment and care, specifically related to   [x] Transitioning off Vidya Berrios   [x] Inpatient management strategy  [] Home management assessment  [] Survival skill education    Diabetes-related medical history  Acute complications: None  Neurological complications  Peripheral neuropathy  Microvascular disease  Nephropathy  Macrovascular disease  CAD      Diabetes medication history  Drug class Currently in use Discontinued Never used   Biguanide      DDP-4 inhibitor       Sulfonylurea      Thiazolidinedione Actos 15 mg daily     GLP-1 RA      SGLT-2 inhibitors      Basal insulin      Fixed Dose  Combinations Humalog 75/25 50 units at breakfast and dinner     Bolus insulin        Subjective   I have had diabetes forever.     Patient reports about a 20 year history of diabetes  Did see Dr Lisa Chan for diabetes care who retired this year, just transitioned care to Dr Luís Jonasblaire  Is a retired computer and   Lives at home with his wife  Does check glucose intermittently. Best glucose values are fasting and in the low 100s- did have a problem with refilling insulin last month and patient reports glucose over 300s at that time.     Admitting A1C 8.6%  Admitting glucose 118  GFR 33  Objective   Physical exam  General Alert, oriented and in no acute distress/ill-appearing. Conversant and cooperative. Vital Signs   Visit Vitals  BP (!) 151/70 (BP 1 Location: Right arm, BP Patient Position: Sitting)   Pulse 63   Temp 97.7 °F (36.5 °C)   Resp 18   Ht 6' (1.829 m)   Wt 137.2 kg (302 lb 7.5 oz)   SpO2 96%   BMI 41.02 kg/m²     Skin  Warm and dry. No acanthosis noted along neckline. No lipohypertrophy or lipoatrophy noted at injection sites   Heart   Regular rate and rhythm. No murmurs, rubs or gallops  Lungs  Clear to auscultation without rales or rhonchi  Extremities No foot wounds      Laboratory  Lab Results   Component Value Date/Time    Hemoglobin A1c 8.6 (H) 09/21/2020 09:38 AM    Hemoglobin A1c (POC) 6.0 (A) 01/21/2020 09:07 AM     Lab Results   Component Value Date/Time    LDL, calculated 66 07/30/2020 10:46 AM     Lab Results   Component Value Date/Time    Creatinine 2.01 (H) 09/21/2020 09:38 AM     Lab Results   Component Value Date/Time    Sodium 137 09/21/2020 09:38 AM    Potassium 3.8 09/21/2020 09:38 AM    Chloride 108 09/21/2020 09:38 AM    CO2 22 09/21/2020 09:38 AM    Anion gap 7 09/21/2020 09:38 AM    Glucose 118 (H) 09/21/2020 09:38 AM    BUN 25 (H) 09/21/2020 09:38 AM    Creatinine 2.01 (H) 09/21/2020 09:38 AM    BUN/Creatinine ratio 12 09/21/2020 09:38 AM    GFR est AA 40 (L) 09/21/2020 09:38 AM    GFR est non-AA 33 (L) 09/21/2020 09:38 AM    Calcium 9.1 09/21/2020 09:38 AM    Bilirubin, total 0.6 09/21/2020 09:38 AM    Alk.  phosphatase 62 09/21/2020 09:38 AM    Protein, total 7.0 09/21/2020 09:38 AM    Albumin 3.3 (L) 09/21/2020 09:38 AM    Globulin 3.7 09/21/2020 09:38 AM    A-G Ratio 0.9 (L) 09/21/2020 09:38 AM    ALT (SGPT) 24 09/21/2020 09:38 AM     Lab Results   Component Value Date/Time    ALT (SGPT) 24 09/21/2020 09:38 AM       Factors affecting BG pattern  Factor Dose Comments   Nutrition:  Carb-controlled meals   60 grams/meal NPO at MN     Blood glucose pattern        Assessment and Plan   Nursing Diagnosis Risk for unstable blood glucose pattern   Nursing Intervention Domain 4913 Decision-making Support   Nursing Interventions Examined current inpatient diabetes control   Explored factors facilitating and impeding inpatient management  Identified self-management practices impeding diabetes control  Explored corrective strategies with patient and responsible inpatient provider   Informed patient of rational for insulin strategy while hospitalized     Evaluation   aCtalina Lu is a 78year old gentleman with uncontrolled diabetes admitted pre-operatively for insulin adjustment prior to scheduled TAVR for severe aortic stenosis. A1C on admission was 8.6% in actos and high dose humalog mix and will require insulin adjustments in the post-operative period to target inpatient glucose goal of 100-180. At this time, glucose is controlled on an insulin infusion in preparation for TAVR tomorrow morning. Recommendations   Recommend:  1. Insulin infusion per pre/post-operative period  2. Continue consistent carbohydrate diet- diet adjustments per cardiac surgery      Will transition to NPH/humalog in the post-operative protocol. Dose TBD. Billing Code(s)   I personally reviewed chart, notes, data and current medications in the medical record, and examined the patient at bedside before making care recommendations.      [x] 95476 IP subsequent hospital care - 20 minutes      RAMIRO Sarah  [facility]  Access via [facility]

## 2020-09-21 NOTE — PROGRESS NOTES
0830 Pt arrived on unit. Telemetry placed and confirmed with monitor tech. VS obtained. 1010 Labs drawn and sent to lab via this RN. 1950 Bedside and Verbal shift change report given to CAROLINE Jose (oncoming nurse) by Shu Wright (offgoing nurse). Report included the following information SBAR, Kardex, Intake/Output, MAR, Recent Results, Med Rec Status and Cardiac Rhythm NSR. Problem: Pressure Injury - Risk of  Goal: *Prevention of pressure injury  Description: Document Erich Scale and appropriate interventions in the flowsheet. Outcome: Progressing Towards Goal  Note: Pressure Injury Interventions:                                            Problem: Patient Education: Go to Patient Education Activity  Goal: Patient/Family Education  Outcome: Progressing Towards Goal     Problem: Falls - Risk of  Goal: *Absence of Falls  Description: Document Albino Messing Fall Risk and appropriate interventions in the flowsheet.   Outcome: Progressing Towards Goal  Note: Fall Risk Interventions:

## 2020-09-21 NOTE — H&P
CSS   History and Physical    Subjective:      Yvonne Bowie is a 78 y.o. male PMHx of s/p CABG x 2, AVR (23 Trifecta) in 10/2014 by Dr. Yahaira Lord d/t endocarditis, CKD3, CHF, HTN, GERD, A flutter, DM2, s/p partial colectomy, ANDRY (uses CPAP), BPH, HLD; that is referred to the [address] [place] [address] by Dr. 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       Pt reports feeling more dyspnea, fatigue, and weakness in last 2-3 weeks. He has gained 20-30 lbs since beginning on pandemic in March 2020. THOMPSON with minimal exertion, with walking short distances. He now has to stop several times to rest while cutting grass, where last year he could do this without stopping. Denies CP, orthopnea, PND, syncope, dizziness, falls, palpitations. He has some mild LE edema that is unchanged. Intermittently his lower legs get red L > R and resolve within a few days.       Former tobacco smoker, quit in 2000. Drinks alcohol rarely and denies drug use.  and lives independently with his wife. His family history is positive for heart disease in his mother. He does not use any assistive devices.       Of note, pt had new dx of A flutter in June 2020 at Dr. Stcaey Kimbrough office, was started on eliquis. Pt only took for a couple days and stopped because he didn't like the appearance of his blood. He is now taking two baby aspirin instead. Pt's left foot 3rd toe noted to be red, pt reports picking at the nail and its been red x 3 days. He has been applying alcohol to it.        Blood/Blackness/Tarriness of stools: No:    Heart Failure Admission w/in past year:  No:   Heart Failure Admission w/in past 2 weeks: No:      NYHA Classification: IIIB              Class I (Mild): No limitation of physical activity. Ordinary physical activity does not cause undue fatigue, palpitation, or dyspnea. Class II (Mild): Slight limitation of physical activity.  Comfortable at rest, but ordinary physical activity results in fatigue, palpitation, or dyspnea. Class III (Moderate): Marked limitation of physical activity. Comfortable at rest, but less than ordinary activity causes fatigue, palpitation, or dyspnea              Class IV (Severe): Unable to carry out any physical activity without discomfort. Symptoms  of cardiac insufficiency at rest.  If any physical activity is undertaken, discomfort is increased.     Angina Classification: 1              Class 0: No symptoms              Class 1: Angina with strenuous exercise              Class 2: Angina with moderate exercise              Class 3: Angina with mild exertion              Walking 1-2 level blocks at normal pace; Climbing 1 flight of stairs at normal pace  Class 4: Angina at any level of physical exertion          Cardiac Testing  EK NSR     TTE 2020: Mod to severe degenerative bioprosthetic aortic stenosis, valve well seated, mean gradient 28, peak 48 mm HG, TAYLER 1.0 cm2. Normal LV EF 15%, grade 2 diastolic dysfunction     CRISTINA 20:   · Normal cavity size and systolic function (ejection fraction normal). Mild concentric hypertrophy. Estimated left ventricular ejection fraction is 55 - 60%. Left ventricular diastolic dysfunction. · Mildly dilated left atrium. No spontaneous echo contrast Left atrial appendage velocity is normal (greater than 40 cm/sec). · Agitated saline contrast study was performed and color flow Doppler was used. There was no shunting at baseline. · Prosthetic aortic valve. Aortic valve mean gradient is 35.4 mmHg. Aortic valve area is 1 cm2. Moderate to severe aortic valve stenosis is present. There is a 23 mm [facility] Trifecta bioprosthetic aortic valve.   · Mild mitral valve regurgitation is present.     LVOT/AV VTI 32.3/90.8 cm  AV Vmax 3.95 m/s  AV MG 35.4 mmHg  AV PG 62.5 mmHg  LVOTd 1.9 cm     TAYLER 1.01 cm2  DI 0.36     Plan RHC/LVEDP/aortic valve study (no coronaries) to evaluate aortic valve hemodynamics. Case discussed with Dr. Jen Atkinson. Results and plans discussed with patient and his wife.      Echo Findings      Left Ventricle  Normal cavity size and systolic function (ejection fraction normal). Mild concentric hypertrophy.  The estimated ejection fraction is 55 - 60%. There is left ventricular diastolic dysfunction. Wall Scoring  The left ventricular wall motion is normal.              Left Atrium  Mildly dilated left atrium. There is no thrombus within the left atrial appendage. There is no mass within the left atrial appendage. No spontaneous echo contrast Appendage velocity is normal (greater than 40 cm/sec). Right Ventricle  Normal cavity size and global systolic function. Right Atrium  Normal cavity size. Interatrial Septum  No PFO noted on color Doppler. Agitated saline contrast study was performed. There was no shunting at baseline. Lipomatous hypertrophy of the interatrial septum. Aortic Valve  No regurgitation. Prosthetic aortic valve. Aortic valve peak gradient is 62.5 mmHg. Aortic valve mean gradient is 35.4 mmHg. Aortic valve area is 1 cm2. Aortic valve peak velocity is 4 cm/s. There is moderate to severe aortic stenosis. Aortic valve AccT 110-120 ms There is a 23 mm [facility] Trifecta bioprosthetic valve present. LVOT/AV VTI 32.3/90.8 cm  AV Vmax 3.95 m/s  AV MG 35.4 mmHg  AV PG 62.5 mmHg  LVOTd 1.9 cm    TAYLER 1.01 cm2  DI 0.36. Mitral Valve  Normal valve structure and no stenosis. Mild regurgitation. Tricuspid Valve  Normal valve structure and no stenosis. Trace regurgitation. Pulmonic Valve  Normal valve structure, no stenosis and no regurgitation. Aorta  Normal aortic root. Pericardium  Normal pericardium and no evidence of pericardial effusion.             L & R Cardiac catheterization: 8/10/20  .  No significant change in native CAD with high-grade stenosis in proximal ramus intermedius and sub-totally occluded proximal LAD.     2. Widely patent LIMA-LAD and SVG-ramus intermedius.     3. Moderate/severe bioprosthetic aortic valve stenosis (EOA 1.31-1.40 cm2, EOAi 0.52-0.55 cm2/m2 using BSA 2.52, MG 39 mmHg).  No hemodynamically significant aortic regurgitation. Jennifer Fleming is likely mild to moderate structural valve degeneration with a component of prosthesis-patient mismatch, leading to a low EOAi.        4. High-normal right-sided filling pressure (RA 12/11/9) with moderately elevated left-sided filling pressures (PCWP 13/13/11; LVEDP 25 mmHg).     5. High-normal pulmonary artery pressures (PA 34/13/22) with normal PVR (1.93 Farah), TPG 11 mmHg.     6. Normal cardiac output (Sariah 5.71 L/min) and cardiac index (Sariah 2.2 L/min/m2).     7. Normal SVR (1205 dynes/sec/cm-5).    8. Results discussed with the patient, the patient's wife and Dr. Daniele Cain (the patient's surgeon in 2014 for CABG + AVR).      9. Continue evaluation for possible Marybeth TAVR vs. re-do bioprosthetic AVR with root enlargement vs. redo mechanical AVR.           Complications      Complications documented before study signed (8/10/2020 11:21 PM)       No complications were associated with this study. Documented by Christina Aponte MD - 8/10/2020 10:55 PM       Coronary Findings      Diagnostic   Dominance: Right   Left Anterior Descending    Prox LAD lesion, 100% stenosed. Ramus Intermedius    Ramus-1 lesion, 85% stenosed. Ramus-2 lesion, 80% stenosed. Right Coronary Artery    The vessel exhibits minimal luminal irregularities. LIMA Graft to Mid LAD    Graft to Ramus    Intervention      No interventions have been documented. Left Heart      Left Ventricle  LV EDP is: 25. Right Heart      Right Heart Cath  PA pressure = 34/14 mmHg. PA mean = 22 mmHg. PA Sat = 78.5 %. Mid RA pressure = 12/12 mmHg. RV pressure = 41/8 mmHg. RV mean = 13 mmHg. Wedge pressure = 11 mmHg. AO Sat = 99 %. Sariah CO = 5.86 L/min.    Sariah CO 5.86  Sariah CI 2.33    Sariah CO #2 5.71  Sariah CI #2 2.27    Hemo Data Vitals - VO2 Value: 314.76 ml/min  Hb: 19 %   Blood Oximetry Information - AV Hb PV: 0 gm/dL   Pressure Phase - Phase: Phase: Baseline   AO Pressures - AO Systolic: 515 mmHg  AO Diastolic: 72 mmHg  AO Mean: 95 mmHg  Heart Rate: 54 bpm   PA Pressures - PA Systolic: 34 mmHg  PA Diastolic: 14 mmHg  PA Mean: 22 mmHg   RV Pressures - RV Systolic: 41 mmHg  RV End Diastolic: 13 mmHg  RV dP/dt: 480 mmHg/sec   LV Pressures - LV Systolic: 801 mmHg  LV End Diastolic: 25 mmHg LV dP/dt: 2496 mmHg/sec   RA Pressures - RA Wedge A Wave: 11 mmHg  RA Wedge V Wave: 12 mmHg  RA Wedge Mean: 9 mmHg   PCW Pressures - PCW A Wave: 13 mmHg  PCW V Wave: 13 mmHg  PCW Mean: 11 mmHg   Atrial Wedge Pressures - Source: Measured  RA Atrial Wedge Heart Rate: 65 bpm   Cath Pressure - FA End Diastolic Cath Pressure: 54 mmHg  PCW Source: Measured  PCW Heart Rate: 58 bpm   Cardiac Output Results - Sariah C.O.: 5.86 L/min  Sariah C. I.: 2.33 L/min/m2  QPI: 2.33 L/min/m2  QSI Value: 2.33 L/min/m2   Resistance Results - PVR: 150.23 dsc-5  PVR-I: 378.29 dsc-5/m2  SVR: 1174.49 dsc-5  SVR-I: 2957.51 dsc-5/m2  PVR/SVR: 0.13  TPR: 300.45 dsc-5  TPR-I: 756.57 dsc-5/m2  TVR: 1297.4 dsc-5  TVR-I: 3267.01 dsc-5/m2  TPR/TVR: 0.23   Aortic Valve Results - AV SEP: 15.2 sec/beat  AV Area: 1.4 cm2  AV Flow: 385.25 cc  AV Index: 0.55 cm2/m2  AV Gradient: 38.75 mmHg   Stroke Work Results - LVSW: 123.9 gm*m  LVSW-I: 49.2 gm*m/m2  RVSW: 17.55 gm*m  RVSW-I: 6.97 gm*m/m2   Valve Results - AV SEP: 15.2 sec/beat  AV Flow: 385.25 cc  AV Area: 1.4 cm2  AV Index: 0.55 cm2/m2  Aortic Valve HR: 55 bpm  AV Gradient: 38.75 mmHg  TPR/TVR: 0.23  PVR/SVR: 0.13             Carotid Dopplers: 12/2019 no ICA stenosis bilat     PFTs: FEV1/Predicted:  n/a  Normal FEV1 > 1 Liter, Predicted = 100%, DLCO > 80%                          Mild Lung Disease (60-70% Predicted)                          Moderate Lung Disease (50-55% Predicted)                          Severe Lung Disease (< 50% Predicted or PO2 < 60 or pCO2>50 on RA)     Gated C/A/P CTA: reviewed by surgeon      Past Medical History:   Diagnosis Date    BPH (benign prostatic hypertrophy)     CAD (coronary artery disease)     CHF NYHA class III (symptoms with mildly strenuous activities) ([facility] [address] 10/24/2014    Chronic kidney disease     diabetic kidneys    CKD (chronic kidney disease), stage III ([facility] [address] 2014    Coagulation disorder ([facility] [address]     PLAVIX    DM (diabetes mellitus) ([facility] [address] 2011    Endocarditis of aortic valve 10/6/2014    GERD (gastroesophageal reflux disease)     HTN (hypertension) 2011    Hyperlipidemia 2011    Hypovitaminosis D 2014    Kidney stone 2011     Past Surgical History:   Procedure Laterality Date    CARDIAC SURG PROCEDURE UNLIST  10/2014    CABG x 2 & AVR    HX CHOLECYSTECTOMY      HX COLECTOMY      diverticulits    HX HEENT      tonsils & adnoids    HX HEENT      wisdom teeth    HX OTHER SURGICAL      dilation of esophagus    HX OTHER SURGICAL      fistulectomy      Social History     Tobacco Use    Smoking status: Former Smoker     Packs/day: 4.00     Years: 30.00     Pack years: 120.00     Types: Cigarettes     Last attempt to quit: 2000     Years since quittin.7    Smokeless tobacco: Former User   Substance Use Topics    Alcohol use: Yes     Comment: RARE      Family History   Problem Relation Age of Onset    Cancer Father         UNKNOWN PRIMARY    Heart Attack Mother     Diabetes Mother     Asthma Mother     Heart Disease Mother     Elevated Lipids Mother     Thyroid Disease Sister     Anesth Problems Neg Hx      Prior to Admission medications    Medication Sig Start Date End Date Taking? Authorizing Provider   clindamycin (CLEOCIN) 300 mg capsule Take 1 Cap by mouth every six (6) hours for 10 days. 20  Vincenzo Ugalde IV, MD   aspirin 81 mg chewable tablet Take 162 mg by mouth daily.  Taking 162 mg since stopped eliquis Provider, Historical   amLODIPine (NORVASC) 5 mg tablet Take 5 mg by mouth daily. Provider, Historical   insulin lispro protamine/insulin lispro (HumaLOG Mix 75-25,U-100,Insuln) 100 unit/mL (75-25) injection 50 Units by SubCUTAneous route Before breakfast and dinner. Indications: type 2 diabetes mellitus 8/4/20   Sandra Perrin MD   pioglitazone (ACTOS) 15 mg tablet Take 1 Tab by mouth daily. 7/7/20   Sandra Perrin MD   losartan (COZAAR) 100 mg tablet Take 1 Tab by mouth daily. 7/7/20   Sandra Perrin MD   allopurinoL (ZYLOPRIM) 100 mg tablet Take 0.5 Tabs by mouth daily. (Dose = 0.5 x 100 mg tablet) 7/7/20   Sandra Perrin MD   atorvastatin (Lipitor) 40 mg tablet Take 1 Tab by mouth daily. 7/7/20   Sandra Perrin MD   Insulin Syringe-Needle U-100 (BD INSULIN SYRINGE ULTRA-FINE) 1 mL 30 gauge x 1/2\" syrg Use twice a day. Dx: E11.9 1/2/20   Elizabeth Myers MD   furosemide (LASIX) 40 mg tablet Take 1 Tab by mouth daily as needed for Other (swelling). 10/15/19   Elizabeth Myers MD   CALCIUM CARBONATE (CALCIUM 500 PO) Take 300 mg by mouth daily. Provider, Historical   ascorbic acid, vitamin C, (VITAMIN C) 1,000 mg tablet Take 2,000 mg by mouth daily. Provider, Historical   magnesium oxide (MAG-OX) 400 mg tablet Take 400 mg by mouth daily. Provider, Historical   potassium 99 mg tablet Take 99 mg by mouth daily. Provider, Historical   turmeric root extract 500 mg cap Take  by mouth. Provider, Historical   cholecalciferol, vitamin D3, 2,000 unit tab Take 5,000 Units by mouth daily. Provider, Historical   b complex vitamins (B COMPLEX 1) tablet Take 1 Tab by mouth daily.     Provider, Historical       Allergies   Allergen Reactions    Iodinated Contrast Media Anaphylaxis     THROAT CLOSED UP    Nafcillin Other (comments)     Dyspnea and chest tightness    Nsaids (Non-Steroidal Anti-Inflammatory Drug) Other (comments)     Patient was told by MD not to take NSAIDS while taking PLAVIX       Review of Systems:   Consititutional: Denies fever or chills. Eyes:  Denies use of glasses or vision problems(cataracts). ENT:  Denies hearing or swallowing difficulty. CV: Denies CP, claudication, HTN. Resp: Denies dyspnea, productive cough. : Denies dialysis or kidney problems. GI: Denies ulcers, esophageal strictures, liver problems. M/S: Denies joint or bone problems, or implanted artificial hardware. Skin: Denies varicose veins, edema. Neuro: Denies strokes, or TIAs. Psych: Denies anxiety or depression. Endocrine: Denies thyroid problems or diabetes. Heme/Lymphatic: Denies easy bruising or lymphedema. Objective:     Visit Vitals  BP (!) 141/56 (BP 1 Location: Right arm, BP Patient Position: At rest)   Pulse (!) 52   Temp 97.5 °F (36.4 °C)   Resp 18   Ht 6' (1.829 m)   Wt 311 lb 4.6 oz (141.2 kg)   SpO2 92%   BMI 42.22 kg/m²         Physical Exam:    General: Well nourished well groomed man appearing stated age accompanied by wife  Neuro: A&OX3. ROCKWELL. PERRL. Steady un-assisted gait  Head:Normocephalic. Atraumatic. Symmetrical  Neck: Trachea Midline  Resp: CTA B. No Adv BS/cough/sputum/tachypnea with seated conversation  CV: S1S2 RRR. TANVI V/VI. No JVD/carotid bruits. Pink/warm/dry extremities. NO LE peripheral edema  GI:Benign ab. Soft. NT/ND. Active BS  : Voids  Integ: No obvious s/s of infection or breakdown  Musculo/Skeletal: FROM in all major joints. Normal muscle tone    Labs: No results for input(s): WBC, HGB, HCT, PLT, NA, K, BUN, CREA, GLU, GLUCPOC, INR, HGBEXT, HCTEXT, PLTEXT, INREXT, HGBEXT, HCTEXT, PLTEXT, INREXT in the last 72 hours. No lab exists for component: GLPOC    Diagnostics:   PA and lateral:   CXR Results  (Last 48 hours)    None        PFTS-FEV1:  Pending     EKG: NSR     Clinic Evaluation:   KCCQ-12: scanned into EMR     5 meter gait: 10 seconds     Frality Survey:  Allen Index ADL - 6/6  scanned into EMR         Assessment:     Active Problems:     Aortic valve stenosis, severe (9/21/2020)        Plan:   The risk and benefit of surgery were reviewed with patient and family and all questions answered and the patient wishes to proceed. Risk include infection, bleeding, stroke, heart attack, irregular heart rhythm, kidney failure and death. Surgery is scheduled for 9/22/20    STS 2.9 Risk Score / Predicted 30 day mortality: - calculations scanned into EMR -- AVR Redo  Risk of Mortality:4.849%  Renal Failure:18.335%  Permanent Stroke:0.806%  Prolonged Ventilation:25.083%  DSW Infection:0.581%  Reoperation:6.135%  Morbidity or Mortality:38.279%  Short Length of Stay:20.178%  Long Length of Stay:11.959%    Treatment Plan:    1. Mod to severe bioprosthetic AV stenosis:  Not good TAVR candidate d/t risk for coronary obstruction. Needs redo AVR. On lasix PRN. Planned for 9/22 w/ Dr. Sherren Quam. Needs renal optimization today. 2. CAD S/p CABG 2014: on asa, statin, no BB historically   3. DM2: On insulin 75/25, actos. Check Hgba1c w/ PAT. Recent HgbA1c 8.8 in 7/2020. Would need 24 hrs preop insulin gtt if A1C >8.0.    4. Chronic Diastolic CHF  5. HTN: on norvasc, cozaar. 6. HLD: on statin  7. ANDRY: uses cpap  8. CKD3: followed by Dr. Enriqueta Fine. Recent Creat 2.35. Check labs today. Further plans once labs back. 9. BPH: not on meds currently  10. A flutter: pt stopped eliquis on his own after few days. Taking 162 mg aspirin currently. Signed By: Gelacio Mcgowan NP     September 21, 2020      Saw patient, agree with above  Risk of morbidity and mortality - high  Medical decision making - high complexity    1. Mod to severe bioprosthetic AV stenosis:  plan for AVR    2. CAD S/p CABG 2014: on asa, statin, no BB historically   3. DM2: On insulin 75/25, actos. Check Hgba1c w/ PAT. Recent HgbA1c 8.8 in 7/2020. Would need 24 hrs preop insulin gtt if A1C >8.0.    4. Chronic Diastolic CHF  5. HTN: on norvasc, cozaar. 6. HLD: on statin  7. ANDRY: uses cpap  8.  CKD3: followed by Dr. Victor Hugo Montoya. Recent Creat 2.35. Check labs today. Further plans once labs back. 9. BPH: not on meds currently  10. A flutter: pt stopped eliquis on his own after few days. Taking 162 mg aspirin currently.

## 2020-09-21 NOTE — H&P
Patient: Alicia Hobbs   Age: 78 y.o. Patient Care Team:  Mariah Guy MD as PCP - General (Internal Medicine)  Karthik Gomez MD as Referring Provider (Cardiology)    PCP: Mariah Guy MD    Cardiologist: Dr. Emeterio Dewitt    Diagnosis/Reason for Consultation: There were no encounter diagnoses. Problem List:   Patient Active Problem List   Diagnosis Code    DM (diabetes mellitus) ([facility] E11.9    Hyperlipidemia E78.5    Kidney stone N20.0    CKD (chronic kidney disease), stage III ([facility] N18.3    Hypovitaminosis D E55.9    S/P CABG x 2 Z95.1    S/P AVR (aortic valve replacement) Z95.2    Hypotension I95.9    LBP (low back pain) M54.5    Type 2 diabetes mellitus without complication, with long-term current use of insulin (HCC) E11.9, Z79.4    Type 2 diabetes with nephropathy ([facility] E11.21    Severe obesity (BMI 35.0-39. 9) with comorbidity ([facility] E66.01    Nonadherence to medical treatment Z91.19         HPI: 78 y.o.  male with PMHx of s/p CABG x 2, AVR (23 Trifecta) in 10/2014 by Dr. Tsering Monzon d/t endocarditis, CKD3, CHF, HTN, GERD, A flutter, DM2, s/p partial colectomy, ANDRY (uses CPAP), BPH, HLD; that is referred to the [address] [place] [address] by Dr. 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 Pt reports feeling more dyspnea, fatigue, and weakness in last 2-3 weeks. He has gained 20-30 lbs since beginning on pandemic in March 2020. THOMPSON with minimal exertion, with walking short distances. He now has to stop several times to rest while cutting grass, where last year he could do this without stopping. Denies CP, orthopnea, PND, syncope, dizziness, falls, palpitations. He has some mild LE edema that is unchanged. Intermittently his lower legs get red L > R and resolve within a few days. Former tobacco smoker, quit in 2000. Drinks alcohol rarely and denies drug use.  and lives independently with his wife. His family history is positive for heart disease in his mother. He does not use any assistive devices. Of note, pt had new dx of A flutter in June 2020 at Dr. Patrice Romero office, was started on eliquis. Pt only took for a couple days and stopped because he didn't like the appearance of his blood. He is now taking two baby aspirin instead. Pt's left foot 3rd toe noted to be red, pt reports picking at the nail and its been red x 3 days. He has been applying alcohol to it. Blood/Blackness/Tarriness of stools: No:    Heart Failure Admission w/in past year:  No:   Heart Failure Admission w/in past 2 weeks: No:     NYHA Classification: IIIB   Class I (Mild): No limitation of physical activity. Ordinary physical activity does not cause undue fatigue, palpitation, or dyspnea. Class II (Mild): Slight limitation of physical activity. Comfortable at rest, but ordinary physical activity results in fatigue, palpitation, or dyspnea. Class III (Moderate): Marked limitation of physical activity. Comfortable at rest, but less than ordinary activity causes fatigue, palpitation, or dyspnea   Class IV (Severe): Unable to carry out any physical activity without discomfort. Symptoms  of cardiac insufficiency at rest.  If any physical activity is undertaken, discomfort is increased.     Angina Classification: 1   Class 0: No symptoms   Class 1: Angina with strenuous exercise   Class 2: Angina with moderate exercise   Class 3: Angina with mild exertion   Walking 1-2 level blocks at normal pace; Climbing 1 flight of stairs at normal pace  Class 4: Angina at any level of physical exertion       Past Medical History:   Diagnosis Date    BPH (benign prostatic hypertrophy)     CAD (coronary artery disease)     CHF NYHA class III (symptoms with mildly strenuous activities) ([facility] 10/24/2014    Chronic kidney disease     diabetic kidneys    CKD (chronic kidney disease), stage III ([facility] [facility] 4/2/2014    Coagulation disorder ([facility]     PLAVIX    DM (diabetes mellitus) ([facility] 2011    Endocarditis of aortic valve 10/6/2014    GERD (gastroesophageal reflux disease)     HTN (hypertension) 2011    Hyperlipidemia 2011    Hypovitaminosis D 2014    Kidney stone 2011     Endocarditis: Yes  -treated in    Pulmonary HTN:  No: 30s/20s Greater than 2/3 systemic: No:   Immunocompromised/Steroids: No:   Liver Dz/Cirrhosis: No:    If yes, MELD score: n/a  Last Dental Visit:  2020   Any dental pain/concerns: no    Past Surgical History:   Procedure Laterality Date    CARDIAC SURG PROCEDURE UNLIST  10/2014    CABG x 2 & AVR    HX CHOLECYSTECTOMY      HX COLECTOMY      diverticulits    HX HEENT      tonsils & adnoids    HX HEENT      wisdom teeth    HX OTHER SURGICAL      dilation of esophagus    HX OTHER SURGICAL      fistulectomy      Social History     Tobacco Use    Smoking status: Former Smoker     Packs/day: 4.00     Years: 30.00     Pack years: 120.00     Types: Cigarettes     Last attempt to quit: 2000     Years since quittin.7    Smokeless tobacco: Former User   Substance Use Topics    Alcohol use: Yes     Comment: RARE      Family History   Problem Relation Age of Onset    Cancer Father         UNKNOWN PRIMARY    Heart Attack Mother     Diabetes Mother     Asthma Mother     Heart Disease Mother     Elevated Lipids Mother     Thyroid Disease Sister     Anesth Problems Neg Hx      Prior to Admission medications    Medication Sig Start Date End Date Taking? Authorizing Provider   clindamycin (CLEOCIN) 300 mg capsule Take 1 Cap by mouth every six (6) hours for 10 days. 20  Adelina Posadas IV, MD   aspirin 81 mg chewable tablet Take 162 mg by mouth daily. Taking 162 mg since stopped eliquis    Provider, Historical   amLODIPine (NORVASC) 5 mg tablet Take 5 mg by mouth daily.     Provider, Historical   insulin lispro protamine/insulin lispro (HumaLOG Mix 75-25,U-100,Insuln) 100 unit/mL (75-25) injection 50 Units by SubCUTAneous route Before breakfast and dinner. Indications: type 2 diabetes mellitus 8/4/20   Lizeth Underwood MD   pioglitazone (ACTOS) 15 mg tablet Take 1 Tab by mouth daily. 7/7/20   Lizeth Underwood MD   losartan (COZAAR) 100 mg tablet Take 1 Tab by mouth daily. 7/7/20   Lizeth Underwood MD   allopurinoL (ZYLOPRIM) 100 mg tablet Take 0.5 Tabs by mouth daily. (Dose = 0.5 x 100 mg tablet) 7/7/20   Lizeth Underwood MD   atorvastatin (Lipitor) 40 mg tablet Take 1 Tab by mouth daily. 7/7/20   Lizeth Underwood MD   Insulin Syringe-Needle U-100 (BD INSULIN SYRINGE ULTRA-FINE) 1 mL 30 gauge x 1/2\" syrg Use twice a day. Dx: E11.9 1/2/20   Latia Voss MD   furosemide (LASIX) 40 mg tablet Take 1 Tab by mouth daily as needed for Other (swelling). 10/15/19   Latia Voss MD   CALCIUM CARBONATE (CALCIUM 500 PO) Take 300 mg by mouth daily. Provider, Historical   ascorbic acid, vitamin C, (VITAMIN C) 1,000 mg tablet Take 2,000 mg by mouth daily. Provider, Historical   magnesium oxide (MAG-OX) 400 mg tablet Take 400 mg by mouth daily. Provider, Historical   potassium 99 mg tablet Take 99 mg by mouth daily. Provider, Historical   turmeric root extract 500 mg cap Take  by mouth. Provider, Historical   cholecalciferol, vitamin D3, 2,000 unit tab Take 5,000 Units by mouth daily. Provider, Historical   b complex vitamins (B COMPLEX 1) tablet Take 1 Tab by mouth daily. Provider, Historical       Allergies   Allergen Reactions    Iodinated Contrast Media Anaphylaxis     THROAT CLOSED UP    Nafcillin Other (comments)     Dyspnea and chest tightness    Nsaids (Non-Steroidal Anti-Inflammatory Drug) Other (comments)     Patient was told by MD not to take NSAIDS while taking PLAVIX       Current Medications:       Vitals: There were no vitals taken for this visit.        Allergies: is allergic to iodinated contrast media; nafcillin; and nsaids (non-steroidal anti-inflammatory drug). Review of Systems: Pertinent Positives per HPI   [] Unable to obtain  ROS due to  []mental status change  []sedated   []intubated   [x]Total of 13 systems reviewed as follows:  Constitutional: Negative fever, negative chills  Eyes:   Negative for amauroses fugax  ENT:   Negative sore throat,oral absecess  Endocrine Negative for thyroid replacement Rx; goiter; DM  Respiratory:  Negative chronic cough,sputum production  Cards:   Negative for palpitations, lower extremity edema, varicosities, claudication  GI:   Negative for dysphagia, bleeding, nausea, vomiting, diarrhea, and abdominal pain  Genitourinary: Negative for frequency, dysuria, BPH   Integument:  Negative for rash and pruritus  Hematologic:  Negative for easy bruising; bleeding dyscarsia  Musculoskel: Negative for muscle weakness inhibiting ambulation  Neurological:  Negative for stroke, TIA, syncope, dizziness  Behavl/Psych: Negative for feelings of anxiety, depression     Cardiovascular Testing:   EK NSR    TTE 2020: Mod to severe degenerative bioprosthetic aortic stenosis, valve well seated, mean gradient 28, peak 48 mm HG, TAYLER 1.0 cm2. Normal LV EF 32%, grade 2 diastolic dysfunction    CRISTINA 20:   · Normal cavity size and systolic function (ejection fraction normal). Mild concentric hypertrophy. Estimated left ventricular ejection fraction is 55 - 60%. Left ventricular diastolic dysfunction. · Mildly dilated left atrium. No spontaneous echo contrast Left atrial appendage velocity is normal (greater than 40 cm/sec). · Agitated saline contrast study was performed and color flow Doppler was used. There was no shunting at baseline. · Prosthetic aortic valve. Aortic valve mean gradient is 35.4 mmHg. Aortic valve area is 1 cm2. Moderate to severe aortic valve stenosis is present. There is a 23 mm [employer] Trifecta bioprosthetic aortic valve. · Mild mitral valve regurgitation is present.      LVOT/AV VTI 32.3/90.8 cm  AV Vmax 3.95 m/s  AV MG 35.4 mmHg  AV PG 62.5 mmHg  LVOTd 1.9 cm     TAYLER 1.01 cm2  DI 0.36     Plan RHC/LVEDP/aortic valve study (no coronaries) to evaluate aortic valve hemodynamics. Case discussed with Dr. Shreya Edwards. Results and plans discussed with patient and his wife.      Echo Findings     Left Ventricle  Normal cavity size and systolic function (ejection fraction normal). Mild concentric hypertrophy. The estimated ejection fraction is 55 - 60%. There is left ventricular diastolic dysfunction. Wall Scoring  The left ventricular wall motion is normal.             Left Atrium  Mildly dilated left atrium. There is no thrombus within the left atrial appendage. There is no mass within the left atrial appendage. No spontaneous echo contrast Appendage velocity is normal (greater than 40 cm/sec). Right Ventricle  Normal cavity size and global systolic function. Right Atrium  Normal cavity size. Interatrial Septum  No PFO noted on color Doppler. Agitated saline contrast study was performed. There was no shunting at baseline. Lipomatous hypertrophy of the interatrial septum. Aortic Valve  No regurgitation. Prosthetic aortic valve. Aortic valve peak gradient is 62.5 mmHg. Aortic valve mean gradient is 35.4 mmHg. Aortic valve area is 1 cm2. Aortic valve peak velocity is 4 cm/s. There is moderate to severe aortic stenosis. Aortic valve AccT 110-120 ms There is a 23 mm [employer] Trifecta bioprosthetic valve present. LVOT/AV VTI 32.3/90.8 cm  AV Vmax 3.95 m/s  AV MG 35.4 mmHg  AV PG 62.5 mmHg  LVOTd 1.9 cm    TAYLER 1.01 cm2  DI 0.36. Mitral Valve  Normal valve structure and no stenosis. Mild regurgitation. Tricuspid Valve  Normal valve structure and no stenosis. Trace regurgitation. Pulmonic Valve  Normal valve structure, no stenosis and no regurgitation. Aorta  Normal aortic root. Pericardium  Normal pericardium and no evidence of pericardial effusion.           L & R Cardiac catheterization: 8/10/20  . No significant change in native CAD with high-grade stenosis in proximal ramus intermedius and sub-totally occluded proximal LAD.     2. Widely patent LIMA-LAD and SVG-ramus intermedius.     3. Moderate/severe bioprosthetic aortic valve stenosis (EOA 1.31-1.40 cm2, EOAi 0.52-0.55 cm2/m2 using BSA 2.52, MG 39 mmHg). No hemodynamically significant aortic regurgitation. There is likely mild to moderate structural valve degeneration with a component of prosthesis-patient mismatch, leading to a low EOAi.        4. High-normal right-sided filling pressure (RA 12/11/9) with moderately elevated left-sided filling pressures (PCWP 13/13/11; LVEDP 25 mmHg).     5. High-normal pulmonary artery pressures (PA 34/13/22) with normal PVR (1.93 Farah), TPG 11 mmHg.     6. Normal cardiac output (Sariah 5.71 L/min) and cardiac index (Sariah 2.2 L/min/m2).     7. Normal SVR (1205 dynes/sec/cm-5).    8. Results discussed with the patient, the patient's wife and Dr. Juvencio Desai (the patient's surgeon in 2014 for CABG + AVR).      9. Continue evaluation for possible Marybeth TAVR vs. re-do bioprosthetic AVR with root enlargement vs. redo mechanical AVR.           Complications     Complications documented before study signed (8/10/2020 11:21 PM)      No complications were associated with this study. Documented by Roger Peña MD - 8/10/2020 10:55 PM       Coronary Findings     Diagnostic   Dominance: Right   Left Anterior Descending    Prox LAD lesion, 100% stenosed. Ramus Intermedius    Ramus-1 lesion, 85% stenosed. Ramus-2 lesion, 80% stenosed. Right Coronary Artery    The vessel exhibits minimal luminal irregularities. LIMA Graft to Mid LAD    Graft to Ramus    Intervention     No interventions have been documented. Left Heart     Left Ventricle  LV EDP is: 25. Right Heart     Right Heart Cath  PA pressure = 34/14 mmHg. PA mean = 22 mmHg. PA Sat = 78.5 %. Mid RA pressure = 12/12 mmHg.  RV pressure = 41/8 mmHg. RV mean = 13 mmHg. Wedge pressure = 11 mmHg. AO Sat = 99 %. Sariah CO = 5.86 L/min. Sariah CO 5.86  Sariah CI 2.33    Sariah CO #2 5.71  Sariah CI #2 2.27    Hemo Data Vitals - VO2 Value: 314.76 ml/min  Hb: 19 %   Blood Oximetry Information - AV Hb PV: 0 gm/dL   Pressure Phase - Phase: Phase: Baseline   AO Pressures - AO Systolic: 327 mmHg  AO Diastolic: 72 mmHg  AO Mean: 95 mmHg  Heart Rate: 54 bpm   PA Pressures - PA Systolic: 34 mmHg  PA Diastolic: 14 mmHg  PA Mean: 22 mmHg   RV Pressures - RV Systolic: 41 mmHg  RV End Diastolic: 13 mmHg  RV dP/dt: 480 mmHg/sec   LV Pressures - LV Systolic: 723 mmHg  LV End Diastolic: 25 mmHg LV dP/dt: 2496 mmHg/sec   RA Pressures - RA Wedge A Wave: 11 mmHg  RA Wedge V Wave: 12 mmHg  RA Wedge Mean: 9 mmHg   PCW Pressures - PCW A Wave: 13 mmHg  PCW V Wave: 13 mmHg  PCW Mean: 11 mmHg   Atrial Wedge Pressures - Source: Measured  RA Atrial Wedge Heart Rate: 65 bpm   Cath Pressure - FA End Diastolic Cath Pressure: 54 mmHg  PCW Source: Measured  PCW Heart Rate: 58 bpm   Cardiac Output Results - Sariah C.O.: 5.86 L/min  Sariah C. I.: 2.33 L/min/m2  QPI: 2.33 L/min/m2  QSI Value: 2.33 L/min/m2   Resistance Results - PVR: 150.23 dsc-5  PVR-I: 378.29 dsc-5/m2  SVR: 1174.49 dsc-5  SVR-I: 2957.51 dsc-5/m2  PVR/SVR: 0.13  TPR: 300.45 dsc-5  TPR-I: 756.57 dsc-5/m2  TVR: 1297.4 dsc-5  TVR-I: 3267.01 dsc-5/m2  TPR/TVR: 0.23   Aortic Valve Results - AV SEP: 15.2 sec/beat  AV Area: 1.4 cm2  AV Flow: 385.25 cc  AV Index: 0.55 cm2/m2  AV Gradient: 38.75 mmHg   Stroke Work Results - LVSW: 123.9 gm*m  LVSW-I: 49.2 gm*m/m2  RVSW: 17.55 gm*m  RVSW-I: 6.97 gm*m/m2   Valve Results - AV SEP: 15.2 sec/beat  AV Flow: 385.25 cc  AV Area: 1.4 cm2  AV Index: 0.55 cm2/m2  Aortic Valve HR: 55 bpm  AV Gradient: 38.75 mmHg  TPR/TVR: 0.23  PVR/SVR: 0.13          Carotid Dopplers: 12/2019 no ICA stenosis bilat    PFTs: FEV1/Predicted:  n/a  Normal FEV1 > 1 Liter, Predicted = 100%, DLCO > 80%    Mild Lung Disease (60-70% Predicted)    Moderate Lung Disease (50-55% Predicted)    Severe Lung Disease (< 50% Predicted or PO2 < 60 or pCO2>50 on RA)    Gated C/A/P CTA: reviewed by surgeon    Physical Exam:  General: Well nourished well groomed man appearing stated age accompanied by wife  Neuro: A&OX3. ROCKWELL. PERRL. Steady un-assisted gait  Head:Normocephalic. Atraumatic. Symmetrical  Neck: Trachea Midline  Resp: CTA B. No Adv BS/cough/sputum/tachypnea with seated conversation  CV: S1S2 RRR. TANVI V/VI. No JVD/carotid bruits. Pink/warm/dry extremities. NO LE peripheral edema  GI:Benign ab. Soft. NT/ND. Active BS  : Voids  Integ: No obvious s/s of infection or breakdown  Musculo/Skeletal: FROM in all major joints. Normal muscle tone    Clinic Evaluation:   KCCQ-12: scanned into EMR    5 meter gait: 10 seconds    Frality Survey:  Brynda Goody Index ADL - 6/6  scanned into EMR     STS 2.9 Risk Score / Predicted 30 day mortality: - calculations scanned into EMR -- AVR Redo  Risk of Mortality:4.849%  Renal Failure:18.335%  Permanent Stroke:0.806%  Prolonged Ventilation:25.083%  DSW Infection:0.581%  Reoperation:6.135%  Morbidity or Mortality:38.279%  Short Length of Stay:20.178%  Long Length of Stay:11.959%      Assessment/Plan:   1. Mod to severe bioprosthetic AV stenosis:  Not good TAVR candidate d/t risk for coronary obstruction. Needs redo AVR. On lasix PRN. 2. CAD S/p CABG 2014: on statin, no BB historically   3. DM2: On insulin 75/25, actos. Check Hgba1c w/ PAT. Recent HgbA1c 8.8 in 7/2020. Would need to be admitted 24 hrs preop for insulin gtt if A1C >8.0.    4. Chronic Diastolic CHF  5. HTN: on norvasc, cozaar. 6. HLD: on statin  7. ANDRY: uses cpap  8. CKD3: followed by Dr. Zenia Esquivel. Recent Creat 2.35.   9. BPH: not on meds currently  10. A flutter: pt stopped eliquis on his own after few days. Taking 162 mg aspirin currently. Addendum:    Saw patient. History and films reviewed. Risks and benefits explained. Agrees with surgery. Patient seen by PA/NP and agree with above.    Findings/Conditions: aortic stenosis; prosthetic valve failure  Plan of Care: redo AVR    Risk of morbidity and mortality - high  Medical decision making - high complexity

## 2020-09-21 NOTE — PROGRESS NOTES
Cardiac Surgery Care Coordinator-  Met with Liseth Bonilla, Introduced role of the Cardiac Surgery Care Coordinator. Reviewed plan of care and began pre-op education. Discussed day of surgery expectations for the pt and family. Instructed pt on the proper use of the incentive spirometer, He is able to pull 2500cc with good effort. Reviewed material in the Valve educational folder including Cardiac Surgery Pathway. Reinforced sternal precautions and keeping your move in the tube. Encouraged Liseth Bonilla to verbalize and offered emotional support. 1400- Met with Mrs Marcella Hart, reviewed day of surgery expectations for the patient and family. She stated that she will be waiting in the main surgical waiting area. Will continue to follow.  Tere Urban RN

## 2020-09-22 ENCOUNTER — APPOINTMENT (OUTPATIENT)
Dept: GENERAL RADIOLOGY | Age: 70
DRG: 220 | End: 2020-09-22
Attending: NURSE PRACTITIONER
Payer: MEDICARE

## 2020-09-22 ENCOUNTER — ANESTHESIA (OUTPATIENT)
Dept: CARDIOTHORACIC SURGERY | Age: 70
DRG: 220 | End: 2020-09-22
Payer: MEDICARE

## 2020-09-22 ENCOUNTER — HOSPITAL ENCOUNTER (OUTPATIENT)
Dept: NON INVASIVE DIAGNOSTICS | Age: 70
Discharge: HOME | End: 2020-09-22
Attending: THORACIC SURGERY (CARDIOTHORACIC VASCULAR SURGERY)

## 2020-09-22 ENCOUNTER — APPOINTMENT (OUTPATIENT)
Dept: GENERAL RADIOLOGY | Age: 70
DRG: 220 | End: 2020-09-22
Attending: THORACIC SURGERY (CARDIOTHORACIC VASCULAR SURGERY)
Payer: MEDICARE

## 2020-09-22 PROBLEM — Z95.2 S/P AVR (AORTIC VALVE REPLACEMENT) AND AORTOPLASTY: Status: ACTIVE | Noted: 2020-09-22

## 2020-09-22 LAB
ABO + RH BLD: NORMAL
ADMINISTERED INITIALS, ADMINIT: NORMAL
ALBUMIN SERPL-MCNC: 3.1 G/DL (ref 3.5–5)
ALBUMIN SERPL-MCNC: 3.5 G/DL (ref 3.5–5)
ALBUMIN/GLOB SERPL: 1.4 {RATIO} (ref 1.1–2.2)
ALBUMIN/GLOB SERPL: 1.6 {RATIO} (ref 1.1–2.2)
ALP SERPL-CCNC: 41 U/L (ref 45–117)
ALP SERPL-CCNC: 45 U/L (ref 45–117)
ALT SERPL-CCNC: 19 U/L (ref 12–78)
ALT SERPL-CCNC: 21 U/L (ref 12–78)
ANION GAP SERPL CALC-SCNC: 4 MMOL/L (ref 5–15)
ANION GAP SERPL CALC-SCNC: 8 MMOL/L (ref 5–15)
APTT PPP: 31 SEC (ref 22.1–32)
ARTERIAL PATENCY WRIST A: ABNORMAL
ARTERIAL PATENCY WRIST A: YES
ARTERIAL PATENCY WRIST A: YES
AST SERPL-CCNC: 29 U/L (ref 15–37)
AST SERPL-CCNC: 35 U/L (ref 15–37)
ATRIAL RATE: 57 BPM
BASE DEFICIT BLD-SCNC: 2 MMOL/L
BASE DEFICIT BLD-SCNC: 3 MMOL/L
BASE DEFICIT BLD-SCNC: 3 MMOL/L
BASOPHILS # BLD: 0 K/UL (ref 0–0.1)
BASOPHILS NFR BLD: 0 % (ref 0–1)
BDY SITE: ABNORMAL
BILIRUB SERPL-MCNC: 1 MG/DL (ref 0.2–1)
BILIRUB SERPL-MCNC: 1.3 MG/DL (ref 0.2–1)
BLD PROD TYP BPU: NORMAL
BLOOD GROUP ANTIBODIES SERPL: NORMAL
BPU ID: NORMAL
BUN SERPL-MCNC: 27 MG/DL (ref 6–20)
BUN SERPL-MCNC: 28 MG/DL (ref 6–20)
BUN/CREAT SERPL: 14 (ref 12–20)
BUN/CREAT SERPL: 14 (ref 12–20)
CA-I BLD-SCNC: 1.25 MMOL/L (ref 1.12–1.32)
CA-I BLD-SCNC: 1.25 MMOL/L (ref 1.12–1.32)
CA-I BLD-SCNC: 1.29 MMOL/L (ref 1.12–1.32)
CALCIUM SERPL-MCNC: 8.5 MG/DL (ref 8.5–10.1)
CALCIUM SERPL-MCNC: 8.5 MG/DL (ref 8.5–10.1)
CALCULATED P AXIS, ECG09: 6 DEGREES
CALCULATED R AXIS, ECG10: -34 DEGREES
CALCULATED T AXIS, ECG11: 63 DEGREES
CHLORIDE SERPL-SCNC: 110 MMOL/L (ref 97–108)
CHLORIDE SERPL-SCNC: 112 MMOL/L (ref 97–108)
CO2 SERPL-SCNC: 24 MMOL/L (ref 21–32)
CO2 SERPL-SCNC: 25 MMOL/L (ref 21–32)
CREAT SERPL-MCNC: 1.88 MG/DL (ref 0.7–1.3)
CREAT SERPL-MCNC: 2.03 MG/DL (ref 0.7–1.3)
CROSSMATCH RESULT,%XM: NORMAL
D50 ADMINISTERED, D50ADM: 0 ML
D50 ORDER, D50ORD: 0 ML
DIAGNOSIS, 93000: NORMAL
DIFFERENTIAL METHOD BLD: ABNORMAL
EOSINOPHIL # BLD: 0 K/UL (ref 0–0.4)
EOSINOPHIL NFR BLD: 0 % (ref 0–7)
ERYTHROCYTE [DISTWIDTH] IN BLOOD BY AUTOMATED COUNT: 14 % (ref 11.5–14.5)
GAS FLOW.O2 O2 DELIVERY SYS: ABNORMAL L/MIN
GAS FLOW.O2 SETTING OXYMISER: 16 BPM
GAS FLOW.O2 SETTING OXYMISER: 16 BPM
GLOBULIN SER CALC-MCNC: 2.2 G/DL (ref 2–4)
GLOBULIN SER CALC-MCNC: 2.2 G/DL (ref 2–4)
GLSCOM COMMENTS: NORMAL
GLUCOSE BLD STRIP.AUTO-MCNC: 104 MG/DL (ref 65–100)
GLUCOSE BLD STRIP.AUTO-MCNC: 104 MG/DL (ref 65–100)
GLUCOSE BLD STRIP.AUTO-MCNC: 105 MG/DL (ref 65–100)
GLUCOSE BLD STRIP.AUTO-MCNC: 106 MG/DL (ref 65–100)
GLUCOSE BLD STRIP.AUTO-MCNC: 109 MG/DL (ref 65–100)
GLUCOSE BLD STRIP.AUTO-MCNC: 111 MG/DL (ref 65–100)
GLUCOSE BLD STRIP.AUTO-MCNC: 116 MG/DL (ref 65–100)
GLUCOSE BLD STRIP.AUTO-MCNC: 124 MG/DL (ref 65–100)
GLUCOSE BLD STRIP.AUTO-MCNC: 129 MG/DL (ref 65–100)
GLUCOSE BLD STRIP.AUTO-MCNC: 135 MG/DL (ref 65–100)
GLUCOSE BLD STRIP.AUTO-MCNC: 135 MG/DL (ref 65–100)
GLUCOSE BLD STRIP.AUTO-MCNC: 151 MG/DL (ref 65–100)
GLUCOSE BLD STRIP.AUTO-MCNC: 97 MG/DL (ref 65–100)
GLUCOSE BLD STRIP.AUTO-MCNC: 98 MG/DL (ref 65–100)
GLUCOSE SERPL-MCNC: 126 MG/DL (ref 65–100)
GLUCOSE SERPL-MCNC: 149 MG/DL (ref 65–100)
GLUCOSE, GLC: 104 MG/DL
GLUCOSE, GLC: 105 MG/DL
GLUCOSE, GLC: 106 MG/DL
GLUCOSE, GLC: 106 MG/DL
GLUCOSE, GLC: 109 MG/DL
GLUCOSE, GLC: 111 MG/DL
GLUCOSE, GLC: 111 MG/DL
GLUCOSE, GLC: 114 MG/DL
GLUCOSE, GLC: 116 MG/DL
GLUCOSE, GLC: 120 MG/DL
GLUCOSE, GLC: 124 MG/DL
GLUCOSE, GLC: 125 MG/DL
GLUCOSE, GLC: 129 MG/DL
GLUCOSE, GLC: 133 MG/DL
GLUCOSE, GLC: 135 MG/DL
GLUCOSE, GLC: 135 MG/DL
GLUCOSE, GLC: 142 MG/DL
GLUCOSE, GLC: 151 MG/DL
GLUCOSE, GLC: 97 MG/DL
HCO3 BLD-SCNC: 22.3 MMOL/L (ref 22–26)
HCO3 BLD-SCNC: 22.9 MMOL/L (ref 22–26)
HCO3 BLD-SCNC: 24.7 MMOL/L (ref 22–26)
HCT VFR BLD AUTO: 43.2 % (ref 36.6–50.3)
HCT VFR BLD AUTO: 43.9 % (ref 36.6–50.3)
HGB BLD-MCNC: 14.4 G/DL (ref 12.1–17)
HGB BLD-MCNC: 14.4 G/DL (ref 12.1–17)
HIGH TARGET, HITG: 130 MG/DL
IMM GRANULOCYTES # BLD AUTO: 0.2 K/UL (ref 0–0.04)
IMM GRANULOCYTES NFR BLD AUTO: 1 % (ref 0–0.5)
INR PPP: 1.2 (ref 0.9–1.1)
INSULIN ADMINSTERED, INSADM: 0.7 UNITS/HOUR
INSULIN ADMINSTERED, INSADM: 0.9 UNITS/HOUR
INSULIN ADMINSTERED, INSADM: 1 UNITS/HOUR
INSULIN ADMINSTERED, INSADM: 1 UNITS/HOUR
INSULIN ADMINSTERED, INSADM: 1.1 UNITS/HOUR
INSULIN ADMINSTERED, INSADM: 1.2 UNITS/HOUR
INSULIN ADMINSTERED, INSADM: 1.3 UNITS/HOUR
INSULIN ADMINSTERED, INSADM: 1.3 UNITS/HOUR
INSULIN ADMINSTERED, INSADM: 1.6 UNITS/HOUR
INSULIN ADMINSTERED, INSADM: 2.2 UNITS/HOUR
INSULIN ADMINSTERED, INSADM: 2.6 UNITS/HOUR
INSULIN ADMINSTERED, INSADM: 2.8 UNITS/HOUR
INSULIN ADMINSTERED, INSADM: 2.9 UNITS/HOUR
INSULIN ADMINSTERED, INSADM: 3.4 UNITS/HOUR
INSULIN ADMINSTERED, INSADM: 3.6 UNITS/HOUR
INSULIN ADMINSTERED, INSADM: 3.8 UNITS/HOUR
INSULIN ADMINSTERED, INSADM: 4.5 UNITS/HOUR
INSULIN ORDER, INSORD: 0.7 UNITS/HOUR
INSULIN ORDER, INSORD: 0.9 UNITS/HOUR
INSULIN ORDER, INSORD: 1 UNITS/HOUR
INSULIN ORDER, INSORD: 1 UNITS/HOUR
INSULIN ORDER, INSORD: 1.1 UNITS/HOUR
INSULIN ORDER, INSORD: 1.2 UNITS/HOUR
INSULIN ORDER, INSORD: 1.3 UNITS/HOUR
INSULIN ORDER, INSORD: 1.3 UNITS/HOUR
INSULIN ORDER, INSORD: 1.6 UNITS/HOUR
INSULIN ORDER, INSORD: 2.2 UNITS/HOUR
INSULIN ORDER, INSORD: 2.6 UNITS/HOUR
INSULIN ORDER, INSORD: 2.8 UNITS/HOUR
INSULIN ORDER, INSORD: 2.9 UNITS/HOUR
INSULIN ORDER, INSORD: 3.4 UNITS/HOUR
INSULIN ORDER, INSORD: 3.6 UNITS/HOUR
INSULIN ORDER, INSORD: 3.8 UNITS/HOUR
INSULIN ORDER, INSORD: 4.5 UNITS/HOUR
LOW TARGET, LOT: 95 MG/DL
LYMPHOCYTES # BLD: 1.1 K/UL (ref 0.8–3.5)
LYMPHOCYTES NFR BLD: 5 % (ref 12–49)
MAGNESIUM SERPL-MCNC: 2.5 MG/DL (ref 1.6–2.4)
MAGNESIUM SERPL-MCNC: 2.7 MG/DL (ref 1.6–2.4)
MCH RBC QN AUTO: 30.1 PG (ref 26–34)
MCHC RBC AUTO-ENTMCNC: 33.3 G/DL (ref 30–36.5)
MCV RBC AUTO: 90.4 FL (ref 80–99)
MINUTES UNTIL NEXT BG, NBG: 120 MIN
MINUTES UNTIL NEXT BG, NBG: 60 MIN
MONOCYTES # BLD: 0.7 K/UL (ref 0–1)
MONOCYTES NFR BLD: 3 % (ref 5–13)
MULTIPLIER, MUL: 0.02
MULTIPLIER, MUL: 0.03
MULTIPLIER, MUL: 0.04
MULTIPLIER, MUL: 0.04
MULTIPLIER, MUL: 0.05
MULTIPLIER, MUL: 0.06
NEUTS SEG # BLD: 19.8 K/UL (ref 1.8–8)
NEUTS SEG NFR BLD: 91 % (ref 32–75)
NRBC # BLD: 0 K/UL (ref 0–0.01)
NRBC BLD-RTO: 0 PER 100 WBC
ORDER INITIALS, ORDINIT: NORMAL
P-R INTERVAL, ECG05: 190 MS
PCO2 BLD: 40.8 MMHG (ref 35–45)
PCO2 BLD: 43.7 MMHG (ref 35–45)
PCO2 BLD: 49.4 MMHG (ref 35–45)
PEEP RESPIRATORY: 5 CMH2O
PH BLD: 7.31 [PH] (ref 7.35–7.45)
PH BLD: 7.33 [PH] (ref 7.35–7.45)
PH BLD: 7.35 [PH] (ref 7.35–7.45)
PLATELET # BLD AUTO: 106 K/UL (ref 150–400)
PMV BLD AUTO: 9.9 FL (ref 8.9–12.9)
PO2 BLD: 38 MMHG (ref 80–100)
PO2 BLD: 75 MMHG (ref 80–100)
PO2 BLD: 87 MMHG (ref 80–100)
POTASSIUM SERPL-SCNC: 4.5 MMOL/L (ref 3.5–5.1)
POTASSIUM SERPL-SCNC: 4.6 MMOL/L (ref 3.5–5.1)
PRESSURE SUPPORT SETTING VENT: 5 CMH2O
PROT SERPL-MCNC: 5.3 G/DL (ref 6.4–8.2)
PROT SERPL-MCNC: 5.7 G/DL (ref 6.4–8.2)
PROTHROMBIN TIME: 12.4 SEC (ref 9–11.1)
Q-T INTERVAL, ECG07: 434 MS
QRS DURATION, ECG06: 124 MS
QTC CALCULATION (BEZET), ECG08: 422 MS
RBC # BLD AUTO: 4.78 M/UL (ref 4.1–5.7)
RBC MORPH BLD: ABNORMAL
SAO2 % BLD: 67 % (ref 92–97)
SAO2 % BLD: 94 % (ref 92–97)
SAO2 % BLD: 96 % (ref 92–97)
SERVICE CMNT-IMP: ABNORMAL
SERVICE CMNT-IMP: NORMAL
SERVICE CMNT-IMP: NORMAL
SODIUM SERPL-SCNC: 141 MMOL/L (ref 136–145)
SODIUM SERPL-SCNC: 142 MMOL/L (ref 136–145)
SPECIMEN EXP DATE BLD: NORMAL
SPECIMEN TYPE: ABNORMAL
STATUS OF UNIT,%ST: NORMAL
THERAPEUTIC RANGE,PTTT: NORMAL SECS (ref 58–77)
UNIT DIVISION, %UDIV: 0
VENTILATION MODE VENT: ABNORMAL
VENTRICULAR RATE, ECG03: 57 BPM
VT SETTING VENT: 600 ML
VT SETTING VENT: 600 ML
WBC # BLD AUTO: 21.8 K/UL (ref 4.1–11.1)

## 2020-09-22 PROCEDURE — 77030011235 HC DRN PERCRD SUMP MEDT -B: Performed by: THORACIC SURGERY (CARDIOTHORACIC VASCULAR SURGERY)

## 2020-09-22 PROCEDURE — 77030003020 HC SUT TICRN COVD -A: Performed by: THORACIC SURGERY (CARDIOTHORACIC VASCULAR SURGERY)

## 2020-09-22 PROCEDURE — 74011000250 HC RX REV CODE- 250: Performed by: NURSE ANESTHETIST, CERTIFIED REGISTERED

## 2020-09-22 PROCEDURE — 77030002978 HC SUT POLY TELE -A: Performed by: THORACIC SURGERY (CARDIOTHORACIC VASCULAR SURGERY)

## 2020-09-22 PROCEDURE — 77030008684 HC TU ET CUF COVD -B: Performed by: ANESTHESIOLOGY

## 2020-09-22 PROCEDURE — 77030018729 HC ELECTRD DEFIB PAD CARD -B: Performed by: THORACIC SURGERY (CARDIOTHORACIC VASCULAR SURGERY)

## 2020-09-22 PROCEDURE — 85018 HEMOGLOBIN: CPT

## 2020-09-22 PROCEDURE — 77030020089 HC KT PERC FEM ART MEDT -C: Performed by: THORACIC SURGERY (CARDIOTHORACIC VASCULAR SURGERY)

## 2020-09-22 PROCEDURE — 77030033069 HC RLD QLC SGL COR-KNOT LSIS -B: Performed by: THORACIC SURGERY (CARDIOTHORACIC VASCULAR SURGERY)

## 2020-09-22 PROCEDURE — 77030032400 HC CATH VENT LT HRT LIVA -B: Performed by: THORACIC SURGERY (CARDIOTHORACIC VASCULAR SURGERY)

## 2020-09-22 PROCEDURE — 77030037878 HC DRSG MEPILEX >48IN BORD MOLN -B: Performed by: THORACIC SURGERY (CARDIOTHORACIC VASCULAR SURGERY)

## 2020-09-22 PROCEDURE — 77030040504 HC DRN WND MDII -B: Performed by: THORACIC SURGERY (CARDIOTHORACIC VASCULAR SURGERY)

## 2020-09-22 PROCEDURE — 77030003029 HC SUT VCRL J&J -B: Performed by: THORACIC SURGERY (CARDIOTHORACIC VASCULAR SURGERY)

## 2020-09-22 PROCEDURE — 74011250636 HC RX REV CODE- 250/636: Performed by: NURSE ANESTHETIST, CERTIFIED REGISTERED

## 2020-09-22 PROCEDURE — 77030010797: Performed by: THORACIC SURGERY (CARDIOTHORACIC VASCULAR SURGERY)

## 2020-09-22 PROCEDURE — 85610 PROTHROMBIN TIME: CPT

## 2020-09-22 PROCEDURE — 76060000044 HC ANESTHESIA 6.5 TO 7 HR: Performed by: THORACIC SURGERY (CARDIOTHORACIC VASCULAR SURGERY)

## 2020-09-22 PROCEDURE — 33405 REPLACEMENT AORTIC VALVE OPN: CPT | Performed by: THORACIC SURGERY (CARDIOTHORACIC VASCULAR SURGERY)

## 2020-09-22 PROCEDURE — 77030040392 HC DRSG OPTIFOAM MDII -A

## 2020-09-22 PROCEDURE — C1713 ANCHOR/SCREW BN/BN,TIS/BN: HCPCS | Performed by: THORACIC SURGERY (CARDIOTHORACIC VASCULAR SURGERY)

## 2020-09-22 PROCEDURE — 77030034689 HC VASC DIL KT W/NDL LIVA -C: Performed by: THORACIC SURGERY (CARDIOTHORACIC VASCULAR SURGERY)

## 2020-09-22 PROCEDURE — 36415 COLL VENOUS BLD VENIPUNCTURE: CPT

## 2020-09-22 PROCEDURE — 71045 X-RAY EXAM CHEST 1 VIEW: CPT

## 2020-09-22 PROCEDURE — 77030014008 HC SPNG HEMSTAT J&J -C: Performed by: THORACIC SURGERY (CARDIOTHORACIC VASCULAR SURGERY)

## 2020-09-22 PROCEDURE — 77030010819: Performed by: THORACIC SURGERY (CARDIOTHORACIC VASCULAR SURGERY)

## 2020-09-22 PROCEDURE — 2709999900 HC NON-CHARGEABLE SUPPLY

## 2020-09-22 PROCEDURE — 88305 TISSUE EXAM BY PATHOLOGIST: CPT

## 2020-09-22 PROCEDURE — B24BZZ4 ULTRASONOGRAPHY OF HEART WITH AORTA, TRANSESOPHAGEAL: ICD-10-PCS | Performed by: ANESTHESIOLOGY

## 2020-09-22 PROCEDURE — C1887 CATHETER, GUIDING: HCPCS | Performed by: THORACIC SURGERY (CARDIOTHORACIC VASCULAR SURGERY)

## 2020-09-22 PROCEDURE — 82962 GLUCOSE BLOOD TEST: CPT

## 2020-09-22 PROCEDURE — 77030018729 HC ELECTRD DEFIB PAD CARD -B

## 2020-09-22 PROCEDURE — 77030013386: Performed by: THORACIC SURGERY (CARDIOTHORACIC VASCULAR SURGERY)

## 2020-09-22 PROCEDURE — 77030005402 HC CATH RAD ART LN KT TELE -B

## 2020-09-22 PROCEDURE — 85025 COMPLETE CBC W/AUTO DIFF WBC: CPT

## 2020-09-22 PROCEDURE — 85730 THROMBOPLASTIN TIME PARTIAL: CPT

## 2020-09-22 PROCEDURE — 74011250637 HC RX REV CODE- 250/637: Performed by: NURSE PRACTITIONER

## 2020-09-22 PROCEDURE — P9045 ALBUMIN (HUMAN), 5%, 250 ML: HCPCS | Performed by: NURSE PRACTITIONER

## 2020-09-22 PROCEDURE — 74011636637 HC RX REV CODE- 636/637: Performed by: NURSE PRACTITIONER

## 2020-09-22 PROCEDURE — 83735 ASSAY OF MAGNESIUM: CPT

## 2020-09-22 PROCEDURE — 77030041076 HC DRSG AG OPTICELL MDII -A: Performed by: THORACIC SURGERY (CARDIOTHORACIC VASCULAR SURGERY)

## 2020-09-22 PROCEDURE — C1751 CATH, INF, PER/CENT/MIDLINE: HCPCS | Performed by: THORACIC SURGERY (CARDIOTHORACIC VASCULAR SURGERY)

## 2020-09-22 PROCEDURE — 02HV33Z INSERTION OF INFUSION DEVICE INTO SUPERIOR VENA CAVA, PERCUTANEOUS APPROACH: ICD-10-PCS | Performed by: ANESTHESIOLOGY

## 2020-09-22 PROCEDURE — C1751 CATH, INF, PER/CENT/MIDLINE: HCPCS

## 2020-09-22 PROCEDURE — 82803 BLOOD GASES ANY COMBINATION: CPT

## 2020-09-22 PROCEDURE — 80053 COMPREHEN METABOLIC PANEL: CPT

## 2020-09-22 PROCEDURE — 74011250636 HC RX REV CODE- 250/636: Performed by: ANESTHESIOLOGY

## 2020-09-22 PROCEDURE — 93355 ECHO TRANSESOPHAGEAL (TEE): CPT | Performed by: THORACIC SURGERY (CARDIOTHORACIC VASCULAR SURGERY)

## 2020-09-22 PROCEDURE — 5A1221Z PERFORMANCE OF CARDIAC OUTPUT, CONTINUOUS: ICD-10-PCS | Performed by: THORACIC SURGERY (CARDIOTHORACIC VASCULAR SURGERY)

## 2020-09-22 PROCEDURE — 77030041469 HC VLV AORT INSPIRIS EDWD -K4: Performed by: THORACIC SURGERY (CARDIOTHORACIC VASCULAR SURGERY)

## 2020-09-22 PROCEDURE — 76010000119 HC CV SURG 6.5 TO 7 HR: Performed by: THORACIC SURGERY (CARDIOTHORACIC VASCULAR SURGERY)

## 2020-09-22 PROCEDURE — 77030026438 HC STYL ET INTUB CARD -A: Performed by: ANESTHESIOLOGY

## 2020-09-22 PROCEDURE — 77030020263 HC SOL INJ SOD CL0.9% LFCR 1000ML: Performed by: THORACIC SURGERY (CARDIOTHORACIC VASCULAR SURGERY)

## 2020-09-22 PROCEDURE — 74011000258 HC RX REV CODE- 258: Performed by: NURSE PRACTITIONER

## 2020-09-22 PROCEDURE — 77030012390 HC DRN CHST BTL GTNG -B: Performed by: THORACIC SURGERY (CARDIOTHORACIC VASCULAR SURGERY)

## 2020-09-22 PROCEDURE — 77030034936 HC DEV MIN COR-KNOT KT LSIS -F: Performed by: THORACIC SURGERY (CARDIOTHORACIC VASCULAR SURGERY)

## 2020-09-22 PROCEDURE — 77030003010 HC SUT SURG STL J&J -B: Performed by: THORACIC SURGERY (CARDIOTHORACIC VASCULAR SURGERY)

## 2020-09-22 PROCEDURE — 77030018835 HC SOL IRR LR ICUM -A: Performed by: THORACIC SURGERY (CARDIOTHORACIC VASCULAR SURGERY)

## 2020-09-22 PROCEDURE — 74011250636 HC RX REV CODE- 250/636: Performed by: NURSE PRACTITIONER

## 2020-09-22 PROCEDURE — 34714 OPN FEM ART EXPOS CNDT CRTJ: CPT | Performed by: THORACIC SURGERY (CARDIOTHORACIC VASCULAR SURGERY)

## 2020-09-22 PROCEDURE — 33530 CORONARY ARTERY BYPASS/REOP: CPT | Performed by: THORACIC SURGERY (CARDIOTHORACIC VASCULAR SURGERY)

## 2020-09-22 PROCEDURE — 77030013798 HC KT TRNSDUC PRSSR EDWD -B: Performed by: THORACIC SURGERY (CARDIOTHORACIC VASCULAR SURGERY)

## 2020-09-22 PROCEDURE — 77030002933 HC SUT MCRYL J&J -A: Performed by: THORACIC SURGERY (CARDIOTHORACIC VASCULAR SURGERY)

## 2020-09-22 PROCEDURE — 77030002986 HC SUT PROL J&J -A: Performed by: THORACIC SURGERY (CARDIOTHORACIC VASCULAR SURGERY)

## 2020-09-22 PROCEDURE — 02PA08Z REMOVAL OF ZOOPLASTIC TISSUE FROM HEART, OPEN APPROACH: ICD-10-PCS | Performed by: THORACIC SURGERY (CARDIOTHORACIC VASCULAR SURGERY)

## 2020-09-22 PROCEDURE — 77030005513 HC CATH URETH FOL11 MDII -B: Performed by: THORACIC SURGERY (CARDIOTHORACIC VASCULAR SURGERY)

## 2020-09-22 PROCEDURE — 2709999900 HC NON-CHARGEABLE SUPPLY: Performed by: THORACIC SURGERY (CARDIOTHORACIC VASCULAR SURGERY)

## 2020-09-22 PROCEDURE — 74011000250 HC RX REV CODE- 250: Performed by: NURSE PRACTITIONER

## 2020-09-22 PROCEDURE — 77030006994: Performed by: THORACIC SURGERY (CARDIOTHORACIC VASCULAR SURGERY)

## 2020-09-22 PROCEDURE — 94002 VENT MGMT INPAT INIT DAY: CPT

## 2020-09-22 PROCEDURE — 02RF08Z REPLACEMENT OF AORTIC VALVE WITH ZOOPLASTIC TISSUE, OPEN APPROACH: ICD-10-PCS | Performed by: THORACIC SURGERY (CARDIOTHORACIC VASCULAR SURGERY)

## 2020-09-22 PROCEDURE — 77030006920 HC BLD STRNL SAW STRY -B: Performed by: THORACIC SURGERY (CARDIOTHORACIC VASCULAR SURGERY)

## 2020-09-22 PROCEDURE — 77030018986 HC SUT ETHBND4 J&J -B: Performed by: THORACIC SURGERY (CARDIOTHORACIC VASCULAR SURGERY)

## 2020-09-22 PROCEDURE — 77030007667 HC INSRT SUT HLD MEDT -B: Performed by: THORACIC SURGERY (CARDIOTHORACIC VASCULAR SURGERY)

## 2020-09-22 PROCEDURE — 65610000003 HC RM ICU SURGICAL

## 2020-09-22 PROCEDURE — 77030020256 HC SOL INJ NACL 0.9%  500ML: Performed by: THORACIC SURGERY (CARDIOTHORACIC VASCULAR SURGERY)

## 2020-09-22 DEVICE — VALVE AORT 25MM REPL RESILIENT BOV PERICARD CO CHROMIUM ALLY: Type: IMPLANTABLE DEVICE | Site: AORTIC VALVE | Status: FUNCTIONAL

## 2020-09-22 RX ORDER — ALBUMIN HUMAN 50 G/1000ML
12.5 SOLUTION INTRAVENOUS
Status: DISCONTINUED | OUTPATIENT
Start: 2020-09-22 | End: 2020-09-24

## 2020-09-22 RX ORDER — HEPARIN SODIUM 1000 [USP'U]/ML
INJECTION, SOLUTION INTRAVENOUS; SUBCUTANEOUS AS NEEDED
Status: DISCONTINUED | OUTPATIENT
Start: 2020-09-22 | End: 2020-09-22 | Stop reason: HOSPADM

## 2020-09-22 RX ORDER — MAGNESIUM SULFATE 100 %
4 CRYSTALS MISCELLANEOUS AS NEEDED
Status: DISCONTINUED | OUTPATIENT
Start: 2020-09-22 | End: 2020-09-29 | Stop reason: HOSPADM

## 2020-09-22 RX ORDER — LANOLIN ALCOHOL/MO/W.PET/CERES
400 CREAM (GRAM) TOPICAL 2 TIMES DAILY
Status: DISCONTINUED | OUTPATIENT
Start: 2020-09-23 | End: 2020-09-27

## 2020-09-22 RX ORDER — SODIUM CHLORIDE 9 MG/ML
9 INJECTION, SOLUTION INTRAVENOUS CONTINUOUS
Status: DISCONTINUED | OUTPATIENT
Start: 2020-09-22 | End: 2020-09-24

## 2020-09-22 RX ORDER — POLYETHYLENE GLYCOL 3350 17 G/17G
17 POWDER, FOR SOLUTION ORAL DAILY
Status: DISCONTINUED | OUTPATIENT
Start: 2020-09-23 | End: 2020-09-29 | Stop reason: HOSPADM

## 2020-09-22 RX ORDER — OXYCODONE HYDROCHLORIDE 5 MG/1
5 TABLET ORAL
Status: DISCONTINUED | OUTPATIENT
Start: 2020-09-22 | End: 2020-09-29 | Stop reason: HOSPADM

## 2020-09-22 RX ORDER — POTASSIUM CHLORIDE 29.8 MG/ML
20 INJECTION INTRAVENOUS
Status: ACTIVE | OUTPATIENT
Start: 2020-09-22 | End: 2020-09-23

## 2020-09-22 RX ORDER — INSULIN GLARGINE 100 [IU]/ML
1-50 INJECTION, SOLUTION SUBCUTANEOUS
Status: ACTIVE | OUTPATIENT
Start: 2020-09-22 | End: 2020-09-23

## 2020-09-22 RX ORDER — SODIUM CHLORIDE 0.9 % (FLUSH) 0.9 %
5-40 SYRINGE (ML) INJECTION EVERY 8 HOURS
Status: DISCONTINUED | OUTPATIENT
Start: 2020-09-22 | End: 2020-09-22 | Stop reason: HOSPADM

## 2020-09-22 RX ORDER — DESMOPRESSIN ACETATE 4 UG/ML
INJECTION, SOLUTION INTRAVENOUS; SUBCUTANEOUS AS NEEDED
Status: DISCONTINUED | OUTPATIENT
Start: 2020-09-22 | End: 2020-09-22 | Stop reason: HOSPADM

## 2020-09-22 RX ORDER — AMIODARONE HYDROCHLORIDE 200 MG/1
400 TABLET ORAL EVERY 12 HOURS
Status: DISCONTINUED | OUTPATIENT
Start: 2020-09-23 | End: 2020-09-27

## 2020-09-22 RX ORDER — SODIUM CHLORIDE 9 MG/ML
25 INJECTION, SOLUTION INTRAVENOUS CONTINUOUS
Status: DISCONTINUED | OUTPATIENT
Start: 2020-09-22 | End: 2020-09-22 | Stop reason: HOSPADM

## 2020-09-22 RX ORDER — NALOXONE HYDROCHLORIDE 0.4 MG/ML
0.4 INJECTION, SOLUTION INTRAMUSCULAR; INTRAVENOUS; SUBCUTANEOUS AS NEEDED
Status: DISCONTINUED | OUTPATIENT
Start: 2020-09-22 | End: 2020-09-29 | Stop reason: HOSPADM

## 2020-09-22 RX ORDER — CALCIUM CHLORIDE INJECTION 100 MG/ML
INJECTION, SOLUTION INTRAVENOUS AS NEEDED
Status: DISCONTINUED | OUTPATIENT
Start: 2020-09-22 | End: 2020-09-22 | Stop reason: HOSPADM

## 2020-09-22 RX ORDER — SODIUM CHLORIDE 0.9 % (FLUSH) 0.9 %
5-40 SYRINGE (ML) INJECTION EVERY 8 HOURS
Status: DISCONTINUED | OUTPATIENT
Start: 2020-09-22 | End: 2020-09-29 | Stop reason: HOSPADM

## 2020-09-22 RX ORDER — CHLORHEXIDINE GLUCONATE 1.2 MG/ML
10 RINSE ORAL EVERY 12 HOURS
Status: DISCONTINUED | OUTPATIENT
Start: 2020-09-22 | End: 2020-09-29 | Stop reason: HOSPADM

## 2020-09-22 RX ORDER — OXYCODONE HYDROCHLORIDE 5 MG/1
10 TABLET ORAL
Status: DISCONTINUED | OUTPATIENT
Start: 2020-09-22 | End: 2020-09-29 | Stop reason: HOSPADM

## 2020-09-22 RX ORDER — SUFENTANIL CITRATE 50 UG/ML
INJECTION EPIDURAL; INTRAVENOUS AS NEEDED
Status: DISCONTINUED | OUTPATIENT
Start: 2020-09-22 | End: 2020-09-22 | Stop reason: HOSPADM

## 2020-09-22 RX ORDER — ROCURONIUM BROMIDE 10 MG/ML
INJECTION, SOLUTION INTRAVENOUS AS NEEDED
Status: DISCONTINUED | OUTPATIENT
Start: 2020-09-22 | End: 2020-09-22 | Stop reason: HOSPADM

## 2020-09-22 RX ORDER — HYDROMORPHONE HYDROCHLORIDE 1 MG/ML
0.5 INJECTION, SOLUTION INTRAMUSCULAR; INTRAVENOUS; SUBCUTANEOUS
Status: DISCONTINUED | OUTPATIENT
Start: 2020-09-22 | End: 2020-09-24

## 2020-09-22 RX ORDER — HYDROMORPHONE HYDROCHLORIDE 1 MG/ML
1 INJECTION, SOLUTION INTRAMUSCULAR; INTRAVENOUS; SUBCUTANEOUS
Status: DISCONTINUED | OUTPATIENT
Start: 2020-09-22 | End: 2020-09-24

## 2020-09-22 RX ORDER — ACETAMINOPHEN 325 MG/1
650 TABLET ORAL EVERY 4 HOURS
Status: DISPENSED | OUTPATIENT
Start: 2020-09-22 | End: 2020-09-24

## 2020-09-22 RX ORDER — BACITRACIN 500 UNIT/G
1 PACKET (EA) TOPICAL AS NEEDED
Status: DISCONTINUED | OUTPATIENT
Start: 2020-09-22 | End: 2020-09-24

## 2020-09-22 RX ORDER — LANOLIN ALCOHOL/MO/W.PET/CERES
3 CREAM (GRAM) TOPICAL
Status: DISCONTINUED | OUTPATIENT
Start: 2020-09-22 | End: 2020-09-29 | Stop reason: HOSPADM

## 2020-09-22 RX ORDER — SODIUM CHLORIDE 0.9 % (FLUSH) 0.9 %
5-40 SYRINGE (ML) INJECTION AS NEEDED
Status: DISCONTINUED | OUTPATIENT
Start: 2020-09-22 | End: 2020-09-22 | Stop reason: HOSPADM

## 2020-09-22 RX ORDER — ONDANSETRON 2 MG/ML
4 INJECTION INTRAMUSCULAR; INTRAVENOUS
Status: DISCONTINUED | OUTPATIENT
Start: 2020-09-22 | End: 2020-09-29 | Stop reason: HOSPADM

## 2020-09-22 RX ORDER — MIDAZOLAM HYDROCHLORIDE 1 MG/ML
1 INJECTION, SOLUTION INTRAMUSCULAR; INTRAVENOUS AS NEEDED
Status: DISCONTINUED | OUTPATIENT
Start: 2020-09-22 | End: 2020-09-22 | Stop reason: HOSPADM

## 2020-09-22 RX ORDER — SUFENTANIL CITRATE 50 UG/ML
INJECTION EPIDURAL; INTRAVENOUS
Status: DISCONTINUED | OUTPATIENT
Start: 2020-09-22 | End: 2020-09-22 | Stop reason: HOSPADM

## 2020-09-22 RX ORDER — PROTAMINE SULFATE 10 MG/ML
INJECTION, SOLUTION INTRAVENOUS AS NEEDED
Status: DISCONTINUED | OUTPATIENT
Start: 2020-09-22 | End: 2020-09-22 | Stop reason: HOSPADM

## 2020-09-22 RX ORDER — SUCCINYLCHOLINE CHLORIDE 20 MG/ML
INJECTION INTRAMUSCULAR; INTRAVENOUS AS NEEDED
Status: DISCONTINUED | OUTPATIENT
Start: 2020-09-22 | End: 2020-09-22 | Stop reason: HOSPADM

## 2020-09-22 RX ORDER — AMOXICILLIN 250 MG
1 CAPSULE ORAL 2 TIMES DAILY
Status: DISCONTINUED | OUTPATIENT
Start: 2020-09-23 | End: 2020-09-29 | Stop reason: HOSPADM

## 2020-09-22 RX ORDER — LIDOCAINE HYDROCHLORIDE 10 MG/ML
0.1 INJECTION, SOLUTION EPIDURAL; INFILTRATION; INTRACAUDAL; PERINEURAL AS NEEDED
Status: DISCONTINUED | OUTPATIENT
Start: 2020-09-22 | End: 2020-09-22 | Stop reason: HOSPADM

## 2020-09-22 RX ORDER — GUAIFENESIN 100 MG/5ML
81 LIQUID (ML) ORAL DAILY
Status: DISCONTINUED | OUTPATIENT
Start: 2020-09-23 | End: 2020-09-27

## 2020-09-22 RX ORDER — SODIUM CHLORIDE 9 MG/ML
250 INJECTION, SOLUTION INTRAVENOUS AS NEEDED
Status: DISCONTINUED | OUTPATIENT
Start: 2020-09-22 | End: 2020-09-24

## 2020-09-22 RX ORDER — SODIUM CHLORIDE, SODIUM LACTATE, POTASSIUM CHLORIDE, CALCIUM CHLORIDE 600; 310; 30; 20 MG/100ML; MG/100ML; MG/100ML; MG/100ML
25 INJECTION, SOLUTION INTRAVENOUS CONTINUOUS
Status: DISCONTINUED | OUTPATIENT
Start: 2020-09-22 | End: 2020-09-22 | Stop reason: HOSPADM

## 2020-09-22 RX ORDER — INSULIN LISPRO 100 [IU]/ML
INJECTION, SOLUTION INTRAVENOUS; SUBCUTANEOUS
Status: DISCONTINUED | OUTPATIENT
Start: 2020-09-22 | End: 2020-09-25

## 2020-09-22 RX ORDER — ALBUTEROL SULFATE 0.83 MG/ML
2.5 SOLUTION RESPIRATORY (INHALATION)
Status: DISCONTINUED | OUTPATIENT
Start: 2020-09-22 | End: 2020-09-29 | Stop reason: HOSPADM

## 2020-09-22 RX ORDER — CEFAZOLIN SODIUM/WATER 2 G/20 ML
2 SYRINGE (ML) INTRAVENOUS EVERY 8 HOURS
Status: COMPLETED | OUTPATIENT
Start: 2020-09-22 | End: 2020-09-23

## 2020-09-22 RX ORDER — PROPOFOL 10 MG/ML
INJECTION, EMULSION INTRAVENOUS AS NEEDED
Status: DISCONTINUED | OUTPATIENT
Start: 2020-09-22 | End: 2020-09-22 | Stop reason: HOSPADM

## 2020-09-22 RX ORDER — GLYCOPYRROLATE 0.2 MG/ML
INJECTION INTRAMUSCULAR; INTRAVENOUS AS NEEDED
Status: DISCONTINUED | OUTPATIENT
Start: 2020-09-22 | End: 2020-09-22 | Stop reason: HOSPADM

## 2020-09-22 RX ORDER — ACETAMINOPHEN 325 MG/1
650 TABLET ORAL ONCE
Status: DISCONTINUED | OUTPATIENT
Start: 2020-09-22 | End: 2020-09-22

## 2020-09-22 RX ORDER — DEXTROSE MONOHYDRATE 100 MG/ML
0-250 INJECTION, SOLUTION INTRAVENOUS AS NEEDED
Status: DISCONTINUED | OUTPATIENT
Start: 2020-09-22 | End: 2020-09-29 | Stop reason: HOSPADM

## 2020-09-22 RX ORDER — DIPHENHYDRAMINE HCL 25 MG
25 CAPSULE ORAL
Status: DISCONTINUED | OUTPATIENT
Start: 2020-09-22 | End: 2020-09-29 | Stop reason: HOSPADM

## 2020-09-22 RX ORDER — DEXMEDETOMIDINE HYDROCHLORIDE 4 UG/ML
.1-.9 INJECTION, SOLUTION INTRAVENOUS
Status: DISCONTINUED | OUTPATIENT
Start: 2020-09-22 | End: 2020-09-24

## 2020-09-22 RX ORDER — INSULIN LISPRO 100 [IU]/ML
INJECTION, SOLUTION INTRAVENOUS; SUBCUTANEOUS
Status: DISCONTINUED | OUTPATIENT
Start: 2020-09-22 | End: 2020-09-24

## 2020-09-22 RX ORDER — SODIUM CHLORIDE 0.9 % (FLUSH) 0.9 %
5-40 SYRINGE (ML) INJECTION AS NEEDED
Status: DISCONTINUED | OUTPATIENT
Start: 2020-09-22 | End: 2020-09-29 | Stop reason: HOSPADM

## 2020-09-22 RX ORDER — FACIAL-BODY WIPES
10 EACH TOPICAL DAILY PRN
Status: DISCONTINUED | OUTPATIENT
Start: 2020-09-22 | End: 2020-09-29 | Stop reason: HOSPADM

## 2020-09-22 RX ORDER — MAGNESIUM SULFATE 1 G/100ML
1 INJECTION INTRAVENOUS AS NEEDED
Status: DISCONTINUED | OUTPATIENT
Start: 2020-09-22 | End: 2020-09-24

## 2020-09-22 RX ORDER — DIPHENHYDRAMINE HYDROCHLORIDE 50 MG/ML
25 INJECTION, SOLUTION INTRAMUSCULAR; INTRAVENOUS
Status: DISCONTINUED | OUTPATIENT
Start: 2020-09-22 | End: 2020-09-24

## 2020-09-22 RX ORDER — FAMOTIDINE 20 MG/1
20 TABLET, FILM COATED ORAL EVERY 12 HOURS
Status: DISCONTINUED | OUTPATIENT
Start: 2020-09-23 | End: 2020-09-23

## 2020-09-22 RX ORDER — LIDOCAINE HYDROCHLORIDE 20 MG/ML
INJECTION, SOLUTION EPIDURAL; INFILTRATION; INTRACAUDAL; PERINEURAL AS NEEDED
Status: DISCONTINUED | OUTPATIENT
Start: 2020-09-22 | End: 2020-09-22 | Stop reason: HOSPADM

## 2020-09-22 RX ORDER — MIDAZOLAM HYDROCHLORIDE 1 MG/ML
1 INJECTION, SOLUTION INTRAMUSCULAR; INTRAVENOUS
Status: DISCONTINUED | OUTPATIENT
Start: 2020-09-22 | End: 2020-09-24

## 2020-09-22 RX ORDER — SODIUM CHLORIDE 9 MG/ML
INJECTION, SOLUTION INTRAVENOUS
Status: DISCONTINUED | OUTPATIENT
Start: 2020-09-22 | End: 2020-09-22 | Stop reason: HOSPADM

## 2020-09-22 RX ORDER — SODIUM CHLORIDE 450 MG/100ML
10 INJECTION, SOLUTION INTRAVENOUS CONTINUOUS
Status: DISCONTINUED | OUTPATIENT
Start: 2020-09-22 | End: 2020-09-24

## 2020-09-22 RX ORDER — MUPIROCIN 20 MG/G
OINTMENT TOPICAL 2 TIMES DAILY
Status: COMPLETED | OUTPATIENT
Start: 2020-09-22 | End: 2020-09-27

## 2020-09-22 RX ORDER — FENTANYL CITRATE 50 UG/ML
50 INJECTION, SOLUTION INTRAMUSCULAR; INTRAVENOUS AS NEEDED
Status: DISCONTINUED | OUTPATIENT
Start: 2020-09-22 | End: 2020-09-22 | Stop reason: HOSPADM

## 2020-09-22 RX ADMIN — ROCURONIUM BROMIDE 50 MG: 10 INJECTION INTRAVENOUS at 12:44

## 2020-09-22 RX ADMIN — SUFENTANIL CITRATE 0.5 MCG/KG/HR: 50 INJECTION EPIDURAL; INTRAVENOUS at 07:45

## 2020-09-22 RX ADMIN — LIDOCAINE HYDROCHLORIDE 100 MG: 20 INJECTION, SOLUTION EPIDURAL; INFILTRATION; INTRACAUDAL; PERINEURAL at 07:47

## 2020-09-22 RX ADMIN — CHLORHEXIDINE GLUCONATE 10 ML: 1.2 RINSE ORAL at 16:10

## 2020-09-22 RX ADMIN — ROCURONIUM BROMIDE 100 MG: 10 INJECTION INTRAVENOUS at 07:49

## 2020-09-22 RX ADMIN — HYDROMORPHONE HYDROCHLORIDE 0.5 MG: 1 INJECTION, SOLUTION INTRAMUSCULAR; INTRAVENOUS; SUBCUTANEOUS at 16:56

## 2020-09-22 RX ADMIN — HEPARIN SODIUM 55000 UNITS: 1000 INJECTION, SOLUTION INTRAVENOUS; SUBCUTANEOUS at 10:28

## 2020-09-22 RX ADMIN — SUCCINYLCHOLINE CHLORIDE 140 MG: 20 INJECTION, SOLUTION INTRAMUSCULAR; INTRAVENOUS at 07:47

## 2020-09-22 RX ADMIN — SUFENTANIL CITRATE 20 MCG: 50 INJECTION EPIDURAL; INTRAVENOUS at 09:22

## 2020-09-22 RX ADMIN — PROTAMINE SULFATE 300 MG: 10 INJECTION, SOLUTION INTRAVENOUS at 13:02

## 2020-09-22 RX ADMIN — SODIUM CHLORIDE 1.3 UNITS/HR: 9 INJECTION, SOLUTION INTRAVENOUS at 07:30

## 2020-09-22 RX ADMIN — ROCURONIUM BROMIDE 50 MG: 10 INJECTION INTRAVENOUS at 10:49

## 2020-09-22 RX ADMIN — SODIUM CHLORIDE 9 ML/HR: 9 INJECTION, SOLUTION INTRAVENOUS at 15:16

## 2020-09-22 RX ADMIN — MIDAZOLAM 2 MG: 1 INJECTION INTRAMUSCULAR; INTRAVENOUS at 07:04

## 2020-09-22 RX ADMIN — SODIUM CHLORIDE 10 G/HR: 900 INJECTION, SOLUTION INTRAVENOUS at 07:50

## 2020-09-22 RX ADMIN — VANCOMYCIN HYDROCHLORIDE 1.5 G: 10 INJECTION, POWDER, LYOPHILIZED, FOR SOLUTION INTRAVENOUS at 07:47

## 2020-09-22 RX ADMIN — PHENYLEPHRINE HYDROCHLORIDE 30 MCG/MIN: 10 INJECTION INTRAVENOUS at 15:07

## 2020-09-22 RX ADMIN — SODIUM CHLORIDE, SODIUM LACTATE, POTASSIUM CHLORIDE, AND CALCIUM CHLORIDE 25 ML/HR: 600; 310; 30; 20 INJECTION, SOLUTION INTRAVENOUS at 06:21

## 2020-09-22 RX ADMIN — GLYCOPYRROLATE 0.4 MG: 0.2 INJECTION INTRAMUSCULAR; INTRAVENOUS at 08:37

## 2020-09-22 RX ADMIN — SODIUM CHLORIDE 10 ML/HR: 450 INJECTION, SOLUTION INTRAVENOUS at 15:13

## 2020-09-22 RX ADMIN — HEPARIN SODIUM 5000 UNITS: 1000 INJECTION, SOLUTION INTRAVENOUS; SUBCUTANEOUS at 08:04

## 2020-09-22 RX ADMIN — CEFAZOLIN SODIUM 2 G: 300 INJECTION, POWDER, LYOPHILIZED, FOR SOLUTION INTRAVENOUS at 15:53

## 2020-09-22 RX ADMIN — FAMOTIDINE 20 MG: 10 INJECTION INTRAVENOUS at 15:54

## 2020-09-22 RX ADMIN — SODIUM CHLORIDE: 900 INJECTION, SOLUTION INTRAVENOUS at 07:40

## 2020-09-22 RX ADMIN — CALCIUM CHLORIDE 0.5 G: 100 INJECTION, SOLUTION INTRAVENOUS; INTRAVENTRICULAR at 13:25

## 2020-09-22 RX ADMIN — MUPIROCIN: 20 OINTMENT TOPICAL at 19:18

## 2020-09-22 RX ADMIN — FAMOTIDINE 20 MG: 10 INJECTION INTRAVENOUS at 20:59

## 2020-09-22 RX ADMIN — Medication 10 ML: at 16:09

## 2020-09-22 RX ADMIN — SUFENTANIL CITRATE 40 MCG: 50 INJECTION EPIDURAL; INTRAVENOUS at 07:47

## 2020-09-22 RX ADMIN — MIDAZOLAM 2 MG: 1 INJECTION INTRAMUSCULAR; INTRAVENOUS at 06:54

## 2020-09-22 RX ADMIN — ALBUMIN (HUMAN) 12.5 G: 12.5 INJECTION, SOLUTION INTRAVENOUS at 15:00

## 2020-09-22 RX ADMIN — ACETAMINOPHEN 650 MG: 325 TABLET ORAL at 20:59

## 2020-09-22 RX ADMIN — CHLORHEXIDINE GLUCONATE 10 ML: 1.2 RINSE ORAL at 20:59

## 2020-09-22 RX ADMIN — DESMOPRESSIN ACETATE 40 MCG: 4 SOLUTION INTRAVENOUS at 13:11

## 2020-09-22 RX ADMIN — FENTANYL CITRATE 100 MCG: 50 INJECTION, SOLUTION INTRAMUSCULAR; INTRAVENOUS at 06:54

## 2020-09-22 RX ADMIN — ROCURONIUM BROMIDE 50 MG: 10 INJECTION INTRAVENOUS at 09:46

## 2020-09-22 RX ADMIN — SUFENTANIL CITRATE 40 MCG: 50 INJECTION EPIDURAL; INTRAVENOUS at 08:55

## 2020-09-22 RX ADMIN — PROPOFOL 150 MG: 10 INJECTION, EMULSION INTRAVENOUS at 07:47

## 2020-09-22 NOTE — ANESTHESIA PROCEDURE NOTES
Central Line Placement    Start time: 9/22/2020 6:30 AM  Performed by: Ayanna Joya MD  Authorized by: Ayanna Joya MD     Indications: vascular access, central pressure monitoring and need for vasopressors  Preanesthetic Checklist: patient identified, risks and benefits discussed, anesthesia consent, site marked, patient being monitored and timeout performed    Timeout Time: 06:30       Pre-procedure: All elements of maximal sterile barrier technique followed? Yes    2% Chlorhexidine for cutaneous antisepsis, Hand hygiene performed prior to catheter insertion and Ultrasound guidance    Sterile Ultrasound Technique followed?: Yes            Procedure:   Prep:  Chlorhexidine  Location: internal jugular  Orientation:  Right  Patient position:  Trendelenburg  Catheter type:  Single lumen  Catheter size: 11Fr. Catheter length:  12 cm  Number of attempts:  2  Successful placement: Yes      Assessment:   Post-procedure:  Catheter secured and sterile dressing applied  Assessment:  Blood return through all ports, free fluid flow and guidewire removal verified  Insertion:  Uncomplicated  Patient tolerance:  Patient tolerated the procedure well with no immediate complications  Coronary sinus catheter placed through the introducer and then advanced it using fluoro and ultrasound guidance. Pressure monitored to confirm placement.

## 2020-09-22 NOTE — BRIEF OP NOTE
BRIEF OP NOTE  Pre-Op Diagnosis: AORTIC VALVE STENOSIS    Post-Op Diagnosis: AORTIC VALVE STENOSIS      Procedure:   REDO STERNOTOMY  AORTIC VALVE REPLACEMENT WITH 25MM BIOPROSTHETIC HEART VALVE  RIGHT FEMORAL artery and vein CUTDOWN FOR CANNULATION    Surgeon: Paulie Moon MD    Assistant(s): Hussein Bay PA-C    Anesthesia: General     Infusions: Amiodarone, precedex, insulin    Estimated Blood Loss:  500ml    Cell Saver:  1500ml    Specimens:   ID Type Source Tests Collected by Time Destination   1 : Explanted bioprosthetic heart valve Fresh Aortic Valve  Billy Menjivar MD 9/22/2020 1211 Pathology       Drains and pacing wires: 2 atrial wires, 1 bipolar ventricular wire, 2 ivania drains    Complications: None    Findings: AS    Implants:   Implant Name Type Inv.  Item Serial No.  Lot No. LRB No. Used Action   Inspiris Resilia Aortic Valve   [id number]  N/A N/A 1 Implanted

## 2020-09-22 NOTE — PROGRESS NOTES
TRANSFER - IN REPORT:    Verbal report received from Josefina Koch, Anesthesia, Sheree Jorge (name) on Ian Nickel  being received from OR(unit) for routine progression of care      Report consisted of patients Situation, Background, Assessment and   Recommendations(SBAR). Information from the following report(s) SBAR, Kardex, OR Summary, Procedure Summary, Intake/Output, MAR, Accordion, Recent Results, Med Rec Status, Cardiac Rhythm Paced/SB, Alarm Parameters , Pre Procedure Checklist, Procedure Verification, Quality Measures and Dual Neuro Assessment was reviewed with the receiving nurse. Opportunity for questions and clarification was provided. Assessment completed upon patients arrival to unit and care assumed. 1500: Assessment. 1535: Obtained blood gases. FiO2 weaned per protocol. 1618: Precedex gtt off    1730: Patient alert and following commands.  Patient placed on SBT    1812: extubated to 4L NC

## 2020-09-22 NOTE — ANESTHESIA PREPROCEDURE EVALUATION
Anesthetic History   No history of anesthetic complications            Review of Systems / Medical History  Patient summary reviewed, nursing notes reviewed and pertinent labs reviewed    Pulmonary  Within defined limits                 Neuro/Psych   Within defined limits           Cardiovascular    Hypertension  Valvular problems/murmurs: aortic stenosis        CAD      Comments: 1. No significant change in native CAD with high-grade stenosis in proximal ramus intermedius and sub-totally occluded proximal LAD.     2. Widely patent LIMA-LAD and SVG-ramus intermedius. · Normal cavity size and systolic function (ejection fraction normal). Mild concentric hypertrophy. Estimated left ventricular ejection fraction is 55 - 60%. Left ventricular diastolic dysfunction. · Mildly dilated left atrium. No spontaneous echo contrast Left atrial appendage velocity is normal (greater than 40 cm/sec). · Agitated saline contrast study was performed and color flow Doppler was used. There was no shunting at baseline. · Prosthetic aortic valve. Aortic valve mean gradient is 35.4 mmHg. Aortic valve area is 1 cm2. Moderate to severe aortic valve stenosis is present. There is a 23 mm [employer] Trifecta bioprosthetic aortic valve. · Mild mitral valve regurgitation is present.    GI/Hepatic/Renal         Renal disease: CRI and stones       Endo/Other    Diabetes    Morbid obesity     Other Findings              Physical Exam    Airway  Mallampati: II  TM Distance: 4 - 6 cm  Neck ROM: normal range of motion   Mouth opening: Normal     Cardiovascular  Regular rate and rhythm,  S1 and S2 normal,  no murmur, click, rub, or gallop             Dental  No notable dental hx       Pulmonary  Breath sounds clear to auscultation               Abdominal  GI exam deferred       Other Findings            Anesthetic Plan    ASA: 4  Anesthesia type: general    Monitoring Plan: Arterial line, BIS, CVP, [place]-Ana and CRISTINA      Induction: Intravenous  Anesthetic plan and risks discussed with: Patient      Discussed with the patient risks of anesthesia for the procedure and they wish to proceed. Plan for GETA with standard ASA monitors, 2 PIV, arterial line, central line, PA catheter, CS catheter, CRISTINA, blood transfusion likely, ICU post op and all other indicated procedures.

## 2020-09-22 NOTE — ANESTHESIA POSTPROCEDURE EVALUATION
Post-Anesthesia Evaluation and Assessment    Patient: Silvestre Edmond MRN: [id number]  SSN: [id number]    YOB: 1950  Age: 78 y.o. Sex: male      I have evaluated the patient and they are stable in the ICU. Cardiovascular Function/Vital Signs  Visit Vitals  /65   Pulse 86   Temp 36.3 °C (97.3 °F)   Resp 8   Ht 6' (1.829 m)   Wt 137 kg (302 lb 0.5 oz)   SpO2 97%   BMI 40.96 kg/m²       Patient is status post General anesthesia for Procedure(s):  REDO STERNOTOMY, AORTIC VALVE REPLACEMENT WITH 25MM BIOPROSTHETIC HEART VALVE; RIGHT FEMORAL CUTDOWN FOR CANNULATION; CRISTINA AND EPIAORTIC BY DR. Aleena Sanders . Nausea/Vomiting: None    Postoperative hydration reviewed and adequate. Pain:  Pain Scale 1: Adult Nonverbal Pain Scale (09/22/20 1500)  Pain Intensity 1: 0 (09/22/20 1500)   Managed    Neurological Status:   Neuro  Neurologic State: Eyes open to voice (09/22/20 1700)  Orientation Level: Unable to verbalize (09/22/20 1500)  Cognition: Follows commands (09/22/20 1700)  Speech: Intubated (09/22/20 1500)  Assessment L Pupil: Round;Sluggish (09/22/20 1500)  Size L Pupil (mm): 2 (09/22/20 1500)  Assessment R Pupil: Round;Sluggish (09/22/20 1500)  Size R Pupil (mm): 2 (09/22/20 1500)  LUE Motor Response: Purposeful;Spontaneous  (09/22/20 1700)  LLE Motor Response: Purposeful;Spontaneous  (09/22/20 1700)  RUE Motor Response: Purposeful;Spontaneous  (09/22/20 1700)  RLE Motor Response: Purposeful;Spontaneous  (09/22/20 1700)   Intubated and sedated     Pulmonary Status:   O2 Device: Endotracheal tube;Ventilator (09/22/20 1500)   Intubated with adequate ventilation and oxygenation     Complications related to anesthesia: None    Post-anesthesia assessment completed.  No concerns    Signed By: Ivana Elizabeth MD     September 22, 2020

## 2020-09-22 NOTE — PROGRESS NOTES
Cardiac Surgery Care Coordinator- Met with the wife of Martinez Cazares, reviewed role of the Cardiac Surgery Co- Nurse. Reviewed plan of care and day of surgery expectations. Provided her with an update from OR. Encouraged her to verbalize and emotional support given. Will continue to update throughout the day. 1100- Met with wife of Martinez Cazares, provided her with an update. She is without questions or concerns at this time. Will continue to follow for educational and emotional needs. 1230- Provided update from the OR, encouraged her to verbalize. 1415- Escorted Mrs Kellen Castillo to the fourth floor waiting room. 80- Escorted Mrs Kellen Castillo to the bedside in CVICU, reviewed plan of care and encouraged her to verbalize. Exchanged contact information,  She will be going home for the day.  Dirk Sanchez RN

## 2020-09-22 NOTE — PERIOP NOTES
Report called to CVICU RN. Information given to include patient's name, age, allergies, height, weight, PMH, invasive lines, procedure, drains and anesthesia drips.

## 2020-09-22 NOTE — ANESTHESIA PROCEDURE NOTES
Arterial Line Placement    Start time: 9/22/2020 6:30 AM  Performed by: Terrance Castillo MD  Authorized by: Terrance Castillo MD     Pre-Procedure  Indications:  Arterial pressure monitoring and blood sampling  Preanesthetic Checklist: patient identified, risks and benefits discussed, anesthesia consent, site marked, patient being monitored, timeout performed and patient being monitored    Timeout Time: 06:30        Procedure:   Prep:  ChloraPrep  Seldinger Technique?: Yes    Orientation:  Right  Location:  Radial artery  Catheter size:  20 G  Number of attempts:  1    Assessment:   Post-procedure:  Line secured and sterile dressing applied  Patient Tolerance:  Patient tolerated the procedure well with no immediate complications

## 2020-09-22 NOTE — ANESTHESIA PROCEDURE NOTES
CRISTINA  Date/Time: 9/22/2020 2:16 PM      Procedure Details: probe placement, image aquisition & interpretation    Risks and benefits discussed with the patient and plans are to proceed    Procedure Note    Performed by: Bruce Lee MD  Authorized by: Bruce Lee MD       Indications: assessment of ascending aorta and assessment of surgical repair  Modalities: 2D, CF, CWD, PWD  Probe Type: biplane, multiplane and epiaortic  Insertion: atraumatic  Patient Status: intubated and sedated    Echocardiographic and Doppler Measurements   Aorta  Size  Diam(cm)  Dissection PlaqueThick(mm)  Plaque Mobile    Ascending normal  No 0-3 No    Arch normal  No 0-3 No    Descending normal  No 0-3 No          Valves  Annulus  Stenosis  Area/Grad  Regurg  Leaflet   Morph  Leaflet   Motion    Aortic bioprosthetic severe  0 normal normal    Mitral normal none  1+ normal normal    Tricuspid normal none  1+ normal normal          Atria  Size  SEC (smoke)  Thrombus  Tumor  Device    Rt Atrium normal No No  Yes    Lt Atrium normal No No  No     Interatrial Septum Morphology: normal    Interventricular Septum Morphology: normal    Ventricle  Cavity Size  Cavity Dimension Hypertrophy  Thrombus  Gloal FXN  EF    RV normal  No no mildly impaired     LV normal  Yes No normal >55%       Regional Function  (1 = normal, 2 = mildly hypokinetic, 3 = severely hypokinetic, 4 = akinetic, 5 = dyskinetic) LAV - Long [place] View   ME LAV = 0  ME LAV = 90  ME LAV = 130   Basal Sept:1 Basal Ant:1 Basal Post:1   Mid Sept:1 Mid Ant:1 Mid Post:1   Apical Sept:1 Apical Ant:1 Basal Ant Sept:1   Basal Lat:1 Basal Inf:1 Mid Ant Sept:1   Mid Lat:1 Mid Inf:1    Apical Lat:1 Apical Inf:1        Pericardium: normal    Post Intervention Follow-up Study  Ventricular Global Function: unchanged  Ventricular Regional Function: unchanged     Valve  Function  Regurgitation  Area    Aortic improved 0     Mitral no change 1+     Tricuspid no change 1+ Prosthetic normal         Comments: Pre Exam- Normal LVEF with no WMA, Mild RV dysfunction. Bioprosthetic AVR with normal appearing leaflets, mean grad 13mmhg, DI 0.32, consistent with patient prothesis mismatch. Trace MR, Trace TR, normal atrium, no PFO, MICHAEL no thrombus. PA cath in place, coronary sinus catheter in place. Post Exam- s/p AVR bioprosthetic well positioned, no AI, mean grad 6. Normal LVEF with LVH, mild RV dysfunction. Rest of exam unchanged.

## 2020-09-22 NOTE — ANESTHESIA PROCEDURE NOTES
Central Line Placement    Start time: 9/22/2020 6:30 AM  Performed by: Omero Snow MD  Authorized by: Omero Snow MD     Indications: vascular access, central pressure monitoring and need for vasopressors  Preanesthetic Checklist: patient identified, risks and benefits discussed, anesthesia consent, site marked, patient being monitored and timeout performed    Timeout Time: 06:30       Pre-procedure: All elements of maximal sterile barrier technique followed? Yes    2% Chlorhexidine for cutaneous antisepsis, Hand hygiene performed prior to catheter insertion and Ultrasound guidance    Sterile Ultrasound Technique followed?: Yes            Procedure:   Prep:  Chlorhexidine  Location: internal jugular  Orientation:  Left  Patient position:  Trendelenburg  Catheter type:  Double lumen  Catheter size:  9 Fr  Catheter length:  12 cm  Number of attempts:  1  Successful placement: Yes      Assessment:   Post-procedure:  Catheter secured and sterile dressing applied  Assessment:  Blood return through all ports, free fluid flow and guidewire removal verified  Insertion:  Uncomplicated  Patient tolerance:  Patient tolerated the procedure well with no immediate complications  RIJ stenosis so able to only place 1 line. Switched to left sided line for MAC. Initial placement of PA vent catheter using fluoro guidance. Removed at the end of the case and replaced with an 8 Fr CCO PAC.

## 2020-09-22 NOTE — PROGRESS NOTES
Problem: Pressure Injury - Risk of  Goal: *Prevention of pressure injury  Description: Document Erich Scale and appropriate interventions in the flowsheet. 9/22/2020 0007 by Bernie Topete RN  Outcome: Progressing Towards Goal  Note: Pressure Injury Interventions: Activity Interventions: Increase time out of bed, Pressure redistribution bed/mattress(bed type)    Nutrition Interventions: Document food/fluid/supplement intake  9/22/2020 0004 by Bernie Topete RN  Outcome: Progressing Towards Goal  Note: Pressure Injury Interventions: Activity Interventions: Increase time out of bed, Pressure redistribution bed/mattress(bed type)    Nutrition Interventions: Document food/fluid/supplement intake       Problem: Falls - Risk of  Goal: *Absence of Falls  Description: Document Hailey Lilliana Fall Risk and appropriate interventions in the flowsheet. 9/22/2020 0007 by Bernie Topete RN  Outcome: Progressing Towards Goal  Note: Fall Risk Interventions:       Medication Interventions: Teach patient to arise slowly, Patient to call before getting OOB    9/22/2020 0004 by Bernie Topete RN  Outcome: Progressing Towards Goal  Note: Fall Risk Interventions:       Medication Interventions: Teach patient to arise slowly, Patient to call before getting OOB    Problem: Pain  Goal: *Control of Pain  Outcome: Progressing Towards Goal       0555: TRANSFER - OUT REPORT:    Verbal report given to CAROLINE Joshi(name) on Dejah Manner  being transferred to Pre op holding(unit) for ordered procedure       Report consisted of patients Situation, Background, Assessment and   Recommendations(SBAR). Information from the following report(s) SBAR, Kardex, Intake/Output, MAR, Recent Results, Med Rec Status and Cardiac Rhythm NSR was reviewed with the receiving nurse.     Lines:   Peripheral IV 09/21/20 Anterior;Right Forearm (Active)   Site Assessment Clean, dry, & intact 09/21/20 2007   Phlebitis Assessment 0 09/21/20 2007 Infiltration Assessment 0 09/21/20 2007   Dressing Status Clean, dry, & intact 09/21/20 2007   Dressing Type Transparent;Tape 09/21/20 2007   Hub Color/Line Status Pink; Infusing 09/21/20 2007   Alcohol Cap Used Yes 09/21/20 2007       Peripheral IV 09/21/20 Anterior; Left Forearm (Active)   Site Assessment Clean, dry, & intact 09/21/20 2007   Phlebitis Assessment 0 09/21/20 2007   Infiltration Assessment 0 09/21/20 2007   Dressing Status Clean, dry, & intact 09/21/20 2007   Dressing Type Transparent;Tape 09/21/20 2007   Hub Color/Line Status Blue;Capped 09/21/20 2007   Alcohol Cap Used Yes 09/21/20 2007        Opportunity for questions and clarification was provided.       Patient transported with:   Monitor  Registered Nurse

## 2020-09-22 NOTE — PROGRESS NOTES
Pharmacy Renal Dosing Protocol  Medication: cefazolin   Current regimen:  2g IV every 6 hr x6 doses  Recent Labs     09/21/20  0938   CREA 2.01*   BUN 25*     Estimated CrCl:  50 ml/min  Plan: Change to 2 g IV q8h x4 doses  for CrCl 35-54 ml/min

## 2020-09-22 NOTE — PERIOP NOTES
Patient stated his full name, date of birth and procedure in pre-op holding; Pt to OR via stretcher. With all wheels locked, pt transferred to OR table via slide board by anesthesia and OR staff X 3. Left IJ [place] derik and Right radial arterial line placed in pre-op holding and intact.   Right IJ [place] placed preop and used for coronary sinus catheter

## 2020-09-23 ENCOUNTER — APPOINTMENT (OUTPATIENT)
Dept: GENERAL RADIOLOGY | Age: 70
DRG: 220 | End: 2020-09-23
Attending: NURSE PRACTITIONER
Payer: MEDICARE

## 2020-09-23 LAB
ADMINISTERED INITIALS, ADMINIT: NORMAL
ALBUMIN SERPL-MCNC: 3.3 G/DL (ref 3.5–5)
ALBUMIN/GLOB SERPL: 1.5 {RATIO} (ref 1.1–2.2)
ALP SERPL-CCNC: 40 U/L (ref 45–117)
ALT SERPL-CCNC: 21 U/L (ref 12–78)
ANION GAP SERPL CALC-SCNC: 7 MMOL/L (ref 5–15)
AST SERPL-CCNC: 40 U/L (ref 15–37)
ATRIAL RATE: 52 BPM
BILIRUB SERPL-MCNC: 0.8 MG/DL (ref 0.2–1)
BUN SERPL-MCNC: 31 MG/DL (ref 6–20)
BUN/CREAT SERPL: 15 (ref 12–20)
CALCIUM SERPL-MCNC: 8.4 MG/DL (ref 8.5–10.1)
CALCULATED P AXIS, ECG09: 37 DEGREES
CALCULATED R AXIS, ECG10: -38 DEGREES
CALCULATED T AXIS, ECG11: -8 DEGREES
CHLORIDE SERPL-SCNC: 111 MMOL/L (ref 97–108)
CO2 SERPL-SCNC: 23 MMOL/L (ref 21–32)
CREAT SERPL-MCNC: 2.03 MG/DL (ref 0.7–1.3)
D50 ADMINISTERED, D50ADM: 0 ML
D50 ORDER, D50ORD: 0 ML
DIAGNOSIS, 93000: NORMAL
ERYTHROCYTE [DISTWIDTH] IN BLOOD BY AUTOMATED COUNT: 14.6 % (ref 11.5–14.5)
GLOBULIN SER CALC-MCNC: 2.2 G/DL (ref 2–4)
GLSCOM COMMENTS: NORMAL
GLUCOSE BLD STRIP.AUTO-MCNC: 100 MG/DL (ref 65–100)
GLUCOSE BLD STRIP.AUTO-MCNC: 103 MG/DL (ref 65–100)
GLUCOSE BLD STRIP.AUTO-MCNC: 108 MG/DL (ref 65–100)
GLUCOSE BLD STRIP.AUTO-MCNC: 111 MG/DL (ref 65–100)
GLUCOSE BLD STRIP.AUTO-MCNC: 112 MG/DL (ref 65–100)
GLUCOSE BLD STRIP.AUTO-MCNC: 113 MG/DL (ref 65–100)
GLUCOSE BLD STRIP.AUTO-MCNC: 118 MG/DL (ref 65–100)
GLUCOSE BLD STRIP.AUTO-MCNC: 119 MG/DL (ref 65–100)
GLUCOSE BLD STRIP.AUTO-MCNC: 128 MG/DL (ref 65–100)
GLUCOSE BLD STRIP.AUTO-MCNC: 136 MG/DL (ref 65–100)
GLUCOSE BLD STRIP.AUTO-MCNC: 139 MG/DL (ref 65–100)
GLUCOSE BLD STRIP.AUTO-MCNC: 144 MG/DL (ref 65–100)
GLUCOSE BLD STRIP.AUTO-MCNC: 147 MG/DL (ref 65–100)
GLUCOSE BLD STRIP.AUTO-MCNC: 168 MG/DL (ref 65–100)
GLUCOSE BLD STRIP.AUTO-MCNC: 284 MG/DL (ref 65–100)
GLUCOSE BLD STRIP.AUTO-MCNC: 88 MG/DL (ref 65–100)
GLUCOSE BLD STRIP.AUTO-MCNC: 88 MG/DL (ref 65–100)
GLUCOSE BLD STRIP.AUTO-MCNC: 89 MG/DL (ref 65–100)
GLUCOSE BLD STRIP.AUTO-MCNC: 91 MG/DL (ref 65–100)
GLUCOSE BLD STRIP.AUTO-MCNC: 93 MG/DL (ref 65–100)
GLUCOSE SERPL-MCNC: 86 MG/DL (ref 65–100)
GLUCOSE, GLC: 100 MG/DL
GLUCOSE, GLC: 103 MG/DL
GLUCOSE, GLC: 108 MG/DL
GLUCOSE, GLC: 111 MG/DL
GLUCOSE, GLC: 112 MG/DL
GLUCOSE, GLC: 113 MG/DL
GLUCOSE, GLC: 118 MG/DL
GLUCOSE, GLC: 119 MG/DL
GLUCOSE, GLC: 128 MG/DL
GLUCOSE, GLC: 136 MG/DL
GLUCOSE, GLC: 139 MG/DL
GLUCOSE, GLC: 144 MG/DL
GLUCOSE, GLC: 147 MG/DL
GLUCOSE, GLC: 168 MG/DL
GLUCOSE, GLC: 88 MG/DL
GLUCOSE, GLC: 88 MG/DL
GLUCOSE, GLC: 91 MG/DL
GLUCOSE, GLC: 93 MG/DL
GLUCOSE, GLC: 96 MG/DL
HCT VFR BLD AUTO: 39.8 % (ref 36.6–50.3)
HGB BLD-MCNC: 13 G/DL (ref 12.1–17)
HIGH TARGET, HITG: 130 MG/DL
INSULIN ADMINSTERED, INSADM: 0.6 UNITS/HOUR
INSULIN ADMINSTERED, INSADM: 0.8 UNITS/HOUR
INSULIN ADMINSTERED, INSADM: 1.1 UNITS/HOUR
INSULIN ADMINSTERED, INSADM: 1.1 UNITS/HOUR
INSULIN ADMINSTERED, INSADM: 1.5 UNITS/HOUR
INSULIN ADMINSTERED, INSADM: 2.2 UNITS/HOUR
INSULIN ADMINSTERED, INSADM: 2.2 UNITS/HOUR
INSULIN ADMINSTERED, INSADM: 2.9 UNITS/HOUR
INSULIN ADMINSTERED, INSADM: 3.3 UNITS/HOUR
INSULIN ADMINSTERED, INSADM: 3.3 UNITS/HOUR
INSULIN ADMINSTERED, INSADM: 3.5 UNITS/HOUR
INSULIN ADMINSTERED, INSADM: 3.6 UNITS/HOUR
INSULIN ADMINSTERED, INSADM: 3.6 UNITS/HOUR
INSULIN ADMINSTERED, INSADM: 4 UNITS/HOUR
INSULIN ADMINSTERED, INSADM: 4.6 UNITS/HOUR
INSULIN ADMINSTERED, INSADM: 4.9 UNITS/HOUR
INSULIN ADMINSTERED, INSADM: 7.3 UNITS/HOUR
INSULIN ORDER, INSORD: 0.6 UNITS/HOUR
INSULIN ORDER, INSORD: 0.8 UNITS/HOUR
INSULIN ORDER, INSORD: 1.1 UNITS/HOUR
INSULIN ORDER, INSORD: 1.1 UNITS/HOUR
INSULIN ORDER, INSORD: 1.5 UNITS/HOUR
INSULIN ORDER, INSORD: 2.2 UNITS/HOUR
INSULIN ORDER, INSORD: 2.2 UNITS/HOUR
INSULIN ORDER, INSORD: 2.9 UNITS/HOUR
INSULIN ORDER, INSORD: 3.3 UNITS/HOUR
INSULIN ORDER, INSORD: 3.3 UNITS/HOUR
INSULIN ORDER, INSORD: 3.5 UNITS/HOUR
INSULIN ORDER, INSORD: 3.6 UNITS/HOUR
INSULIN ORDER, INSORD: 3.6 UNITS/HOUR
INSULIN ORDER, INSORD: 4 UNITS/HOUR
INSULIN ORDER, INSORD: 4.6 UNITS/HOUR
INSULIN ORDER, INSORD: 4.9 UNITS/HOUR
INSULIN ORDER, INSORD: 7.3 UNITS/HOUR
LOW TARGET, LOT: 95 MG/DL
MAGNESIUM SERPL-MCNC: 2.5 MG/DL (ref 1.6–2.4)
MCH RBC QN AUTO: 30.2 PG (ref 26–34)
MCHC RBC AUTO-ENTMCNC: 32.7 G/DL (ref 30–36.5)
MCV RBC AUTO: 92.6 FL (ref 80–99)
MINUTES UNTIL NEXT BG, NBG: 120 MIN
MINUTES UNTIL NEXT BG, NBG: 60 MIN
MULTIPLIER, MUL: 0.02
MULTIPLIER, MUL: 0.03
MULTIPLIER, MUL: 0.04
MULTIPLIER, MUL: 0.04
MULTIPLIER, MUL: 0.05
MULTIPLIER, MUL: 0.05
MULTIPLIER, MUL: 0.06
MULTIPLIER, MUL: 0.07
NRBC # BLD: 0 K/UL (ref 0–0.01)
NRBC BLD-RTO: 0 PER 100 WBC
ORDER INITIALS, ORDINIT: NORMAL
P-R INTERVAL, ECG05: 190 MS
PLATELET # BLD AUTO: 93 K/UL (ref 150–400)
PMV BLD AUTO: 10.3 FL (ref 8.9–12.9)
POTASSIUM SERPL-SCNC: 4.7 MMOL/L (ref 3.5–5.1)
PROT SERPL-MCNC: 5.5 G/DL (ref 6.4–8.2)
Q-T INTERVAL, ECG07: 384 MS
QRS DURATION, ECG06: 112 MS
QTC CALCULATION (BEZET), ECG08: 357 MS
RBC # BLD AUTO: 4.3 M/UL (ref 4.1–5.7)
SERVICE CMNT-IMP: ABNORMAL
SERVICE CMNT-IMP: NORMAL
SODIUM SERPL-SCNC: 141 MMOL/L (ref 136–145)
VENTRICULAR RATE, ECG03: 52 BPM
WBC # BLD AUTO: 14.5 K/UL (ref 4.1–11.1)

## 2020-09-23 PROCEDURE — 99231 SBSQ HOSP IP/OBS SF/LOW 25: CPT | Performed by: CLINICAL NURSE SPECIALIST

## 2020-09-23 PROCEDURE — P9045 ALBUMIN (HUMAN), 5%, 250 ML: HCPCS | Performed by: NURSE PRACTITIONER

## 2020-09-23 PROCEDURE — 74011636637 HC RX REV CODE- 636/637: Performed by: NURSE PRACTITIONER

## 2020-09-23 PROCEDURE — 97165 OT EVAL LOW COMPLEX 30 MIN: CPT

## 2020-09-23 PROCEDURE — 36415 COLL VENOUS BLD VENIPUNCTURE: CPT

## 2020-09-23 PROCEDURE — 82962 GLUCOSE BLOOD TEST: CPT

## 2020-09-23 PROCEDURE — 83735 ASSAY OF MAGNESIUM: CPT

## 2020-09-23 PROCEDURE — 93005 ELECTROCARDIOGRAM TRACING: CPT

## 2020-09-23 PROCEDURE — 80053 COMPREHEN METABOLIC PANEL: CPT

## 2020-09-23 PROCEDURE — 71045 X-RAY EXAM CHEST 1 VIEW: CPT

## 2020-09-23 PROCEDURE — 74011250637 HC RX REV CODE- 250/637: Performed by: NURSE PRACTITIONER

## 2020-09-23 PROCEDURE — 74011000258 HC RX REV CODE- 258: Performed by: NURSE PRACTITIONER

## 2020-09-23 PROCEDURE — 65660000001 HC RM ICU INTERMED STEPDOWN

## 2020-09-23 PROCEDURE — 85027 COMPLETE CBC AUTOMATED: CPT

## 2020-09-23 PROCEDURE — 74011250636 HC RX REV CODE- 250/636: Performed by: NURSE PRACTITIONER

## 2020-09-23 PROCEDURE — 77030027138 HC INCENT SPIROMETER -A

## 2020-09-23 PROCEDURE — 74011000250 HC RX REV CODE- 250: Performed by: NURSE PRACTITIONER

## 2020-09-23 PROCEDURE — 97116 GAIT TRAINING THERAPY: CPT

## 2020-09-23 PROCEDURE — 97535 SELF CARE MNGMENT TRAINING: CPT

## 2020-09-23 PROCEDURE — 97161 PT EVAL LOW COMPLEX 20 MIN: CPT

## 2020-09-23 RX ORDER — PANTOPRAZOLE SODIUM 40 MG/1
40 TABLET, DELAYED RELEASE ORAL
Status: DISCONTINUED | OUTPATIENT
Start: 2020-09-24 | End: 2020-09-29 | Stop reason: HOSPADM

## 2020-09-23 RX ADMIN — SODIUM CHLORIDE 2.9 UNITS/HR: 900 INJECTION, SOLUTION INTRAVENOUS at 20:10

## 2020-09-23 RX ADMIN — CEFAZOLIN SODIUM 2 G: 300 INJECTION, POWDER, LYOPHILIZED, FOR SOLUTION INTRAVENOUS at 08:19

## 2020-09-23 RX ADMIN — OXYCODONE 10 MG: 5 TABLET ORAL at 21:06

## 2020-09-23 RX ADMIN — AMIODARONE HYDROCHLORIDE 400 MG: 200 TABLET ORAL at 18:26

## 2020-09-23 RX ADMIN — FAMOTIDINE 20 MG: 20 TABLET ORAL at 07:06

## 2020-09-23 RX ADMIN — OXYCODONE 5 MG: 5 TABLET ORAL at 04:24

## 2020-09-23 RX ADMIN — CHLORHEXIDINE GLUCONATE 10 ML: 1.2 RINSE ORAL at 21:07

## 2020-09-23 RX ADMIN — ALBUMIN (HUMAN) 12.5 G: 12.5 INJECTION, SOLUTION INTRAVENOUS at 01:06

## 2020-09-23 RX ADMIN — Medication 10 ML: at 21:07

## 2020-09-23 RX ADMIN — DOCUSATE SODIUM 50MG AND SENNOSIDES 8.6MG 1 TABLET: 8.6; 5 TABLET, FILM COATED ORAL at 18:25

## 2020-09-23 RX ADMIN — MUPIROCIN: 20 OINTMENT TOPICAL at 21:08

## 2020-09-23 RX ADMIN — MUPIROCIN: 20 OINTMENT TOPICAL at 08:17

## 2020-09-23 RX ADMIN — CEFAZOLIN SODIUM 2 G: 300 INJECTION, POWDER, LYOPHILIZED, FOR SOLUTION INTRAVENOUS at 16:25

## 2020-09-23 RX ADMIN — CHLORHEXIDINE GLUCONATE 10 ML: 1.2 RINSE ORAL at 08:16

## 2020-09-23 RX ADMIN — Medication 10 ML: at 14:25

## 2020-09-23 RX ADMIN — ACETAMINOPHEN 650 MG: 325 TABLET ORAL at 00:08

## 2020-09-23 RX ADMIN — ACETAMINOPHEN 650 MG: 325 TABLET ORAL at 16:25

## 2020-09-23 RX ADMIN — ACETAMINOPHEN 650 MG: 325 TABLET ORAL at 21:06

## 2020-09-23 RX ADMIN — OXYCODONE 5 MG: 5 TABLET ORAL at 08:46

## 2020-09-23 RX ADMIN — DOCUSATE SODIUM 50MG AND SENNOSIDES 8.6MG 1 TABLET: 8.6; 5 TABLET, FILM COATED ORAL at 08:11

## 2020-09-23 RX ADMIN — ACETAMINOPHEN 650 MG: 325 TABLET ORAL at 08:11

## 2020-09-23 RX ADMIN — ATORVASTATIN CALCIUM 40 MG: 40 TABLET, FILM COATED ORAL at 08:11

## 2020-09-23 RX ADMIN — POLYETHYLENE GLYCOL 3350 17 G: 17 POWDER, FOR SOLUTION ORAL at 08:11

## 2020-09-23 RX ADMIN — OXYCODONE 10 MG: 5 TABLET ORAL at 12:49

## 2020-09-23 RX ADMIN — ACETAMINOPHEN 650 MG: 325 TABLET ORAL at 04:24

## 2020-09-23 RX ADMIN — ACETAMINOPHEN 650 MG: 325 TABLET ORAL at 11:02

## 2020-09-23 RX ADMIN — HYDROMORPHONE HYDROCHLORIDE 1 MG: 1 INJECTION, SOLUTION INTRAMUSCULAR; INTRAVENOUS; SUBCUTANEOUS at 22:03

## 2020-09-23 RX ADMIN — SODIUM CHLORIDE 7.3 UNITS/HR: 900 INJECTION, SOLUTION INTRAVENOUS at 14:24

## 2020-09-23 RX ADMIN — ASPIRIN 81 MG CHEWABLE TABLET 81 MG: 81 TABLET CHEWABLE at 08:11

## 2020-09-23 RX ADMIN — OXYCODONE 10 MG: 5 TABLET ORAL at 17:13

## 2020-09-23 RX ADMIN — HYDROMORPHONE HYDROCHLORIDE 1 MG: 1 INJECTION, SOLUTION INTRAMUSCULAR; INTRAVENOUS; SUBCUTANEOUS at 16:23

## 2020-09-23 RX ADMIN — MAGNESIUM OXIDE 400 MG: 400 TABLET ORAL at 18:25

## 2020-09-23 RX ADMIN — CEFAZOLIN SODIUM 2 G: 300 INJECTION, POWDER, LYOPHILIZED, FOR SOLUTION INTRAVENOUS at 00:08

## 2020-09-23 RX ADMIN — SODIUM CHLORIDE 0.8 UNITS/HR: 900 INJECTION, SOLUTION INTRAVENOUS at 06:24

## 2020-09-23 NOTE — PROGRESS NOTES
Cardiac Surgery Care Coordinator-  Met with Joni Lance. Reviewed plan of care and discussed goals for the day. Joni Lance has a good understanding of his plan for the day. Reinforced sternal precautions and encouraged continued use of the incentive spirometer. Discussed possible discharge date and encouraged Joni Lance to verbalize. Will continue to follow for educational and emotional needs. [employer]- Placed update call to Mrs Jillian Gan, she is on her way to the hospital now. Will continue to follow.  Isabela Qureshi RN

## 2020-09-23 NOTE — PROGRESS NOTES
1930: Bedside and Verbal shift change report given to [id number] (oncoming nurse) by Jodie Bonilla, RN (offgoing nurse). Report included the following information SBAR, ED Summary, OR Summary, Procedure Summary, Intake/Output, MAR, Recent Results, Cardiac Rhythm NSR and Alarm Parameters . 2030: Stand dysphagia screen completed WDL    0100: UPO 10ml/hr, one bottle of albumin given    0200: Uop increased to 75ml for this past hour. Will continue to monitor    0415: Pt complaining of 5/10 pain, 5mg or oxycodone given    0515: Pt delined, cruz discontinued    0730: Bedside and Verbal shift change report given to Jodie Bonilla RN (oncoming nurse) by Jaswant Nance RN (offgoing nurse). Report included the following information SBAR, ED Summary, OR Summary, Procedure Summary, Intake/Output, MAR, Recent Results, Cardiac Rhythm Paced and Alarm Parameters .

## 2020-09-23 NOTE — PROGRESS NOTES
[facility] ICU Progress Note    Admit Date: 2020  POD:  1 Day Post-Op    Procedure:  Procedure(s):  REDO STERNOTOMY, AORTIC VALVE REPLACEMENT WITH 25MM BIOPROSTHETIC HEART VALVE; RIGHT FEMORAL CUTDOWN FOR CANNULATION; CRISTINA AND EPIAORTIC BY DR. Valeriano Puckett         Subjective:   Pt seen with Dr. Isatu Sanchez. Afebrile, Oxygen at 2L per NC. Up in the chair. Some postoperative discomfort but denies nausea. Objective:   Vitals:  Blood pressure (!) 137/58, pulse 64, temperature 97.7 °F (36.5 °C), resp. rate 14, height 6' (1.829 m), weight 304 lb 3.2 oz (138 kg), SpO2 99 %. Temp (24hrs), Av.3 °F (36.3 °C), Min:96.8 °F (36 °C), Max:98.1 °F (36.7 °C)    Hemodynamics:   CO: CO (l/min): 7.5 l/min   CI: CI (l/min/m2): 2.9 l/min/m2   CVP: CVP (mmHg): 10 mmHg (20 0400)   SVR: SVR (dyne*sec)/cm5: 832 (dyne*sec)/cm5 (20 4948)   PAP Systolic: PAP Systolic: 31 (66/44/36 4748)   PAP Diastolic: PAP Diastolic: 18 (54/79/45 9618)   PVR:     SV02: SVO2 (%): 73 % (20 0400)   SCV02:      EKG/Rhythm:  Sinus rhythm in the 60s. Extubation Date / Time: 20 at 1812    CT Output: 35 ml + 97 ml     Oxygen Therapy:  Oxygen Therapy  O2 Sat (%): 99 % (20 1000)  Pulse via Oximetry: 86 beats per minute (20 0400)  O2 Device: Nasal cannula (20 0800)  O2 Flow Rate (L/min): 2 l/min (20 0800)  FIO2 (%): 50 % (20 1730)    CXR:   CXR Results  (Last 48 hours)               20 0459  XR CHEST PORT Final result    Impression:  IMPRESSION: Cardiomegaly. Patchy airspace opacity in the left lung base   increased in interval. This may represent atelectasis. Continued follow-up is   recommended       Narrative:  EXAM: XR CHEST PORT       INDICATION: postop heart       COMPARISON: 2020       FINDINGS: A portable AP radiograph of the chest was obtained at 409 hours. Median sternotomy changes are noted. Left IJ [place]-Ana catheter is present with   the tip in the main pulmonary artery. [place]  Patchy airspace opacities present in the   left lung base increase in interval.. Heart size is enlarged. The patient's been   extubated and the nasogastric tube removed. .  The bones and soft tissues are   grossly within normal limits. 09/22/20 1608  XR CHEST PORT Final result    Impression:  IMPRESSION: Life-support lines and tubes as above. Left lower lobe atelectasis. Narrative: Indication: Aortic valve replacement. Comparison to 9/21/2020. Portable exam obtained at 2321 demonstrates placement   of a left jugular [place]-Ana catheter with the tip projecting over the main   pulmonary artery outflow tract. ET tube has been placed and projects in   satisfactory position. NG tube has been placed but the tip is not visualized. Left lower lobe atelectasis. There is no pneumothorax.           09/22/20 0900  XR CHEST SNGL V Final result    Impression:  IMPRESSION:   2 image/s was/were obtained intraoperatively . These show what is probably an   esophageal endoscope, a pulmonary arterial catheter, and ET tube, and a second   central line. No radiologist was present during image acquisition. Please see   operative note for further details. FLUOROSCOPY TIME PROVIDED: 6 minutes, 10 seconds           Narrative:  INTERPRETATION PROVIDED FOR COMPLIANCE ONLY AT NO CHARGE       CLINICAL HISTORY: Pulmonary arterial vent placement. COMPARISON: None. 09/21/20 1449  XR CHEST PA LAT Final result    Impression:  IMPRESSION:   1. The heart size is normal.   2. No acute process       Narrative:  Exam:  2 view chest       Indication: Preop for heart surgery. COMPARISON: 11/9/2017       PA and lateral views demonstrate patient status post median sternotomy. The   heart size is normal. There is a prominent left-sided epicardial fat pad. . Lungs   are clear. Osseous structures are unremarkable.                    Admission Weight: Last Weight   Weight: 302 lb 7.5 oz (137.2 kg) Weight: 304 lb 3.2 oz (138 kg)     Intake / Output / Drain:  Current Shift:  0701 -  1900  In: 597.9 [P.O.:280; I.V.:317.9]  Out: 120 [Urine:100; Drains:20]  Last 24 hrs.:     Intake/Output Summary (Last 24 hours) at 2020 1104  Last data filed at 2020 1000  Gross per 24 hour   Intake 3933.29 ml   Output 1942 ml   Net 1991.29 ml       EXAM:  General:  No acute distress. Up in the chair. Lungs:   Decreased aeration in the bases   Incision:  Dressing clean, dry and attact   Heart:  Regular rate and rhythm, S1, S2 normal, no murmur, click, rub or gallop. Abdomen:   Soft, non-tender. Bowel sounds hypoactive. No masses,  No organomegaly. Extremities:  No edema. PPP. Neurologic:  Gross motor and sensory apparatus intact. Labs:   Recent Labs     20  0947  20  0420  20  1457   WBC  --   --  14.5*  --  21.8*   HGB  --   --  13.0   < > 14.4   HCT  --   --  39.8   < > 43.2   PLT  --   --  93*  --  106*   NA  --   --  141   < > 141   K  --   --  4.7   < > 4.5   BUN  --   --  31*   < > 27*   CREA  --   --  2.03*   < > 1.88*   GLU  --   --  86   < > 149*   GLUCPOC 139*   < >  --    < >  --    INR  --   --   --   --  1.2*    < > = values in this interval not displayed. Assessment:     Active Problems: Aortic valve stenosis, severe (2020)      Aortic stenosis (2020)      S/P AVR (aortic valve replacement) and aortoplasty (2020)      Overview: REDO STERNOTOMY      AORTIC VALVE REPLACEMENT WITH 25MM BIOPROSTHETIC HEART VALVE      RIGHT FEMORAL artery and vein CUTDOWN FOR CANNULATION         Plan/Recommendations/Medical Decision Makin. Mod to severe bioprosthetic AV stenosis s/p redo AVR:  Continue ASA (81 mg for now due to low platelets). Will discuss restarting Eliquis with Dr. Sandoval Dumont    2.  CAD S/p CABG : on asa, statin, no BB historically     3. DM2: On insulin 75/25, actos at home. Hgba1c of 8.6. DTS consult.  Continue Insulin gtt for now then transition off per DTS recommendations. 4. Chronic Diastolic CHF    5. HTN: on norvasc, cozaar PTA. Restart antihypertensives as needed. 6. HLD: on statin    7. ANDRY: uses cpap    8. CKD3: Creatinine stable today at 2.03. Avoid hypotension and nephrotoxic agents. Continue to monitor    9. BPH: not on meds currently    10. A flutter: Currently in Sinus rhythm. Currently on ASA 81mg. Will discuss restarting Eliquis with Dr. Naseem Burroughs. Patient previously stopped the Eliquis on his own prior to surgery. 11. Anemia: Postoperative secondary to acute blood loss. Currently above transfusion threshold. Monitor daily CBC. 12. Atelectasis:  IS and TCDB. Increase activity. Wean oxygen to keep sat >92%. 13. Thrombocytopenia: Platelets at 59J. Continue ASA 81 mg for now. Change Pepcid to Protonix. Continue to monitor    14. Nutrition: Cardiac/Diabetic    15. DVT/GI prophylaxis: SCD, PPI    Dispo: PT/OT/Cardiac Rehab. Case Management for discharge planning.  Transfer to [facility] today      Signed By: Magalis Fraser NP

## 2020-09-23 NOTE — PROGRESS NOTES
Bedside shift change report given to [address]  (oncoming nurse) by Katarina Esposito RN (offgoing nurse). Report included the following information SBAR, Kardex, ED Summary, OR Summary, Procedure Summary, Intake/Output, MAR, Accordion, Recent Results, Med Rec Status, Cardiac Rhythm Paced, Alarm Parameters , Pre Procedure Checklist, Procedure Verification, Quality Measures and Dual Neuro Assessment. 0800: Assessment. Patient up in chair, no complaints of pain at this time. VSS    1000: Pacer set to back up rate of 50. Patient is NSR.    1130: Transfer orders for stepdown rec'd. TRANSFER - OUT REPORT:    Verbal report given to Hernán Shepherd Rn(name) on [employer]  being transferred to [facility] (unit) for routine progression of care       Report consisted of patients Situation, Background, Assessment and   Recommendations(SBAR). Information from the following report(s) SBAR, Kardex, OR Summary, Procedure Summary, Intake/Output, MAR, Accordion, Recent Results, Med Rec Status, Cardiac Rhythm NSR, Alarm Parameters , Pre Procedure Checklist, Procedure Verification, Quality Measures and Dual Neuro Assessment was reviewed with the receiving nurse. Lines:   Peripheral IV 09/21/20 Anterior;Right Forearm (Active)   Site Assessment Other (Comment) 09/23/20 1200   Phlebitis Assessment 0 09/23/20 0800   Infiltration Assessment 0 09/23/20 0800   Dressing Status Clean, dry, & intact 09/23/20 0800   Dressing Type Transparent;Tape 09/23/20 0800   Hub Color/Line Status Pink; Infusing;Flushed;Patent 09/23/20 0800   Action Taken Open ports on tubing capped 09/23/20 0800   Alcohol Cap Used Yes 09/23/20 0800       Peripheral IV 09/21/20 Anterior; Left Forearm (Active)   Site Assessment Other (Comment) 09/23/20 1200   Phlebitis Assessment 0 09/23/20 0800   Infiltration Assessment 0 09/23/20 0800   Dressing Status Clean, dry, & intact 09/23/20 0800   Dressing Type Transparent;Tape 09/23/20 0800   Hub Color/Line Status Blue;Capped;Flushed;Patent 09/23/20 0800   Action Taken Open ports on tubing capped 09/23/20 0800   Alcohol Cap Used Yes 09/23/20 0800        Opportunity for questions and clarification was provided.       Patient transported with:   Monitor  O2 @ 2 liters  Registered Nurse

## 2020-09-23 NOTE — PROGRESS NOTES
Spiritual Care Assessment/Progress Note  [facility]      NAME: Jodie Salinas      MRN: [id number]  AGE: 78 y.o.  SEX: male  Taoism Affiliation: Hinduism   Language: English     9/23/2020     Total Time (in minutes): 5     Spiritual Assessment begun in [facility] [place] [facility] INTNS[facility] C[facility] through conversation with:         [x]Patient        [] Family    [] Friend(s)        Reason for Consult: [facility]     Spiritual beliefs: (Please include comment if needed)     [x] Identifies with a doug tradition: Hinduism        [x] Supported by a doug community:  [address] [place] [address]          [] Claims no spiritual orientation:           [] Seeking spiritual identity:                [] Adheres to an individual form of spirituality:           [] Not able to assess:                           Identified resources for coping:      [x] Prayer                               [x] Music                  [] Guided Imagery     [x] Family/friends                 [] Pet visits     [] Devotional reading                         [] Unknown     [] Other:                                              Interventions offered during this visit: (See comments for more details)    Patient Interventions: Affirmation of doug, Communion (Hinduism), Initial/Spiritual assessment, patient floor, Normalization of emotional/spiritual concerns, Prayer (actual), Prayer (assurance of)           Plan of Care:     [x] Support spiritual and/or cultural needs    [] Support AMD and/or advance care planning process      [] Support grieving process   [] Coordinate Rites and/or Rituals    [] Coordination with community clergy   [] No spiritual needs identified at this time   [] Detailed Plan of Care below (See Comments)  [] Make referral to Music Therapy  [] Make referral to Pet Therapy     [] Make referral to Addiction services  [] Make referral to [facility]  [] Make referral to Spiritual Care Partner  [] No future visits requested        [] Follow up visits as needed     Comments: Mr. Tomeka Anaya was sitting in his chair. His  had just visited. He had not received communion so prayer and communion offered. Will plan to visit on Friday.       ESVIN Aguilar, RN, ACSW, LCSW   Page:  [phone]

## 2020-09-23 NOTE — PROGRESS NOTES
Problem: Pressure Injury - Risk of  Goal: *Prevention of pressure injury  Description: Document Erich Scale and appropriate interventions in the flowsheet. Outcome: Progressing Towards Goal  Note: Pressure Injury Interventions:  Sensory Interventions: Assess need for specialty bed, Keep linens dry and wrinkle-free, Minimize linen layers, Turn and reposition approx. every two hours (pillows and wedges if needed)         Activity Interventions: Pressure redistribution bed/mattress(bed type)    Mobility Interventions: Pressure redistribution bed/mattress (bed type)    Nutrition Interventions: Document food/fluid/supplement intake    Friction and Shear Interventions: Lift sheet, Transferring/repositioning devices                Problem: Patient Education: Go to Patient Education Activity  Goal: Patient/Family Education  Outcome: Progressing Towards Goal     Problem: Falls - Risk of  Goal: *Absence of Falls  Description: Document Sahil Fall Risk and appropriate interventions in the flowsheet. Outcome: Progressing Towards Goal  Note: Fall Risk Interventions:            Medication Interventions: Evaluate medications/consider consulting pharmacy, Patient to call before getting OOB    Elimination Interventions: Patient to call for help with toileting needs              Problem: Patient Education: Go to Patient Education Activity  Goal: Patient/Family Education  Outcome: Progressing Towards Goal     Problem: Pain  Goal: *Control of Pain  Outcome: Progressing Towards Goal     Problem: Patient Education: Go to Patient Education Activity  Goal: Patient/Family Education  Outcome: Progressing Towards Goal     Problem: Diabetes Self-Management  Goal: *Disease process and treatment process  Description: Define diabetes and identify own type of diabetes; list 3 options for treating diabetes.   Outcome: Progressing Towards Goal  Goal: *Incorporating nutritional management into lifestyle  Description: Describe effect of type, amount and timing of food on blood glucose; list 3 methods for planning meals. Outcome: Progressing Towards Goal  Goal: *Incorporating physical activity into lifestyle  Description: State effect of exercise on blood glucose levels. Outcome: Progressing Towards Goal  Goal: *Developing strategies to promote health/change behavior  Description: Define the ABC's of diabetes; identify appropriate screenings, schedule and personal plan for screenings. Outcome: Progressing Towards Goal  Goal: *Using medications safely  Description: State effect of diabetes medications on diabetes; name diabetes medication taking, action and side effects. Outcome: Progressing Towards Goal  Goal: *Monitoring blood glucose, interpreting and using results  Description: Identify recommended blood glucose targets  and personal targets. Outcome: Progressing Towards Goal  Goal: *Prevention, detection, treatment of acute complications  Description: List symptoms of hyper- and hypoglycemia; describe how to treat low blood sugar and actions for lowering  high blood glucose level. Outcome: Progressing Towards Goal  Goal: *Prevention, detection and treatment of chronic complications  Description: Define the natural course of diabetes and describe the relationship of blood glucose levels to long term complications of diabetes.   Outcome: Progressing Towards Goal  Goal: *Developing strategies to address psychosocial issues  Description: Describe feelings about living with diabetes; identify support needed and support network  Outcome: Progressing Towards Goal  Goal: *Insulin pump training  Outcome: Progressing Towards Goal  Goal: *Sick day guidelines  Outcome: Progressing Towards Goal  Goal: *Patient Specific Goal (EDIT GOAL, INSERT TEXT)  Outcome: Progressing Towards Goal     Problem: Patient Education: Go to Patient Education Activity  Goal: Patient/Family Education  Outcome: Progressing Towards Goal     Problem: Patient Education: Go to Patient Education Activity  Goal: Patient/Family Education  Outcome: Progressing Towards Goal     Problem: Cardiac Valve Surgery: Day of Surgery/Post-Op (Initiate SCIP measures for post-op care)  Goal: Off Pathway (Use only if patient is Off Pathway)  Outcome: Progressing Towards Goal  Goal: Activity/Safety  Outcome: Progressing Towards Goal  Goal: Consults, if ordered  Outcome: Progressing Towards Goal  Goal: Diagnostic Test/Procedures  Outcome: Progressing Towards Goal  Goal: Nutrition/Diet  Outcome: Progressing Towards Goal  Goal: Medications  Outcome: Progressing Towards Goal  Goal: Respiratory  Outcome: Progressing Towards Goal  Goal: Treatments/Interventions/Procedures  Outcome: Progressing Towards Goal  Goal: Psychosocial  Outcome: Progressing Towards Goal  Goal: *Hemodynamically stable (eg: Cardiac output/index; pulmonary arterial pressures; mixed venous oxygen saturation within set parameters)  Outcome: Progressing Towards Goal  Goal: *Lungs clear or at baseline (eg: ABG's; oxygen saturation; end-tidal CO2  within set parameters)  Outcome: Progressing Towards Goal  Goal: *Valve type identified  Outcome: Progressing Towards Goal  Goal: *Stable cardiac rhythm  Outcome: Progressing Towards Goal  Goal: *Afebrile  Outcome: Progressing Towards Goal  Goal: *Follows simple commands post anesthesia  Outcome: Progressing Towards Goal  Goal: *Orients easily following extubation  Outcome: Progressing Towards Goal  Goal: *Optimal pain control at patient's stated goal  Outcome: Progressing Towards Goal     Problem: Cardiac Valve Surgery: Post-Op Day 1  Goal: Off Pathway (Use only if patient is Off Pathway)  Outcome: Progressing Towards Goal  Goal: Activity/Safety  Outcome: Progressing Towards Goal  Goal: Consults, if ordered  Outcome: Progressing Towards Goal  Goal: Diagnostic Test/Procedures  Outcome: Progressing Towards Goal  Goal: Nutrition/Diet  Outcome: Progressing Towards Goal  Goal: Medications  Outcome: Progressing Towards Goal  Goal: Respiratory  Outcome: Progressing Towards Goal  Goal: Treatments/Interventions/Procedures  Outcome: Progressing Towards Goal  Goal: Psychosocial  Outcome: Progressing Towards Goal  Goal: *Hemodynamically stable without vasoactive medications  Outcome: Progressing Towards Goal  Goal: *Stable cardiac rhythm  Outcome: Progressing Towards Goal  Goal: *Lungs clear or at baseline  Outcome: Progressing Towards Goal  Goal: *Mechanical ventilation discontinued  Outcome: Progressing Towards Goal  Goal: *Optimal pain control at patient's stated goal  Outcome: Progressing Towards Goal  Goal: *Demonstrates progressive activity  Outcome: Progressing Towards Goal  Goal: *Tolerating diet  Outcome: Progressing Towards Goal     Problem: Cardiac Valve Surgery: Post-Op Day 2  Goal: Off Pathway (Use only if patient is Off Pathway)  Outcome: Progressing Towards Goal  Goal: Activity/Safety  Outcome: Progressing Towards Goal  Goal: Consults, if ordered  Outcome: Progressing Towards Goal  Goal: Diagnostic Test/Procedures  Outcome: Progressing Towards Goal  Goal: Nutrition/Diet  Outcome: Progressing Towards Goal  Goal: Discharge Planning  Outcome: Progressing Towards Goal  Goal: Medications  Outcome: Progressing Towards Goal  Goal: Respiratory  Outcome: Progressing Towards Goal  Goal: Treatments/Interventions/Procedures  Outcome: Progressing Towards Goal  Goal: Psychosocial  Outcome: Progressing Towards Goal  Goal: *Hemodynamically stable without vasoactive medications  Outcome: Progressing Towards Goal  Goal: *Stable cardiac rhythm  Outcome: Progressing Towards Goal  Goal: *Lungs clear or at baseline  Outcome: Progressing Towards Goal  Goal: *Mechanical ventilation discontinued  Outcome: Progressing Towards Goal  Goal: *Optimal pain control at patient's stated goal  Outcome: Progressing Towards Goal  Goal: *Demonstrates progressive activity  Outcome: Progressing Towards Goal  Goal: *Tolerating diet  Outcome: Progressing Towards Goal  Goal: *Incisions without signs and symptoms of wound complication  Outcome: Progressing Towards Goal     Problem: Cardiac Valve Surgery: Post-Op Day 3  Goal: Off Pathway (Use only if patient is Off Pathway)  Outcome: Progressing Towards Goal  Goal: Activity/Safety  Outcome: Progressing Towards Goal  Goal: Diagnostic Test/Procedures  Outcome: Progressing Towards Goal  Goal: Nutrition/Diet  Outcome: Progressing Towards Goal  Goal: Discharge Planning  Outcome: Progressing Towards Goal  Goal: Medications  Outcome: Progressing Towards Goal  Goal: Respiratory  Outcome: Progressing Towards Goal  Goal: Treatments/Interventions/Procedures  Outcome: Progressing Towards Goal  Goal: Psychosocial  Outcome: Progressing Towards Goal  Goal: *Hemodynamically stable without vasoactive medications  Outcome: Progressing Towards Goal  Goal: *Stable cardiac rhythm  Outcome: Progressing Towards Goal  Goal: *Lungs clear or at baseline  Outcome: Progressing Towards Goal  Goal: *Optimal pain control at patient's stated goal  Outcome: Progressing Towards Goal  Goal: *Incisions without signs and symptoms of wound complication  Outcome: Progressing Towards Goal  Goal: *Demonstrates progressive activity  Outcome: Progressing Towards Goal  Goal: *Tolerating diet  Outcome: Progressing Towards Goal     Problem: Cardiac Valve Surgery: Post-Op Day 4  Goal: Off Pathway (Use only if patient is Off Pathway)  Outcome: Progressing Towards Goal  Goal: Activity/Safety  Outcome: Progressing Towards Goal  Goal: Diagnostic Test/Procedures  Outcome: Progressing Towards Goal  Goal: Nutrition/Diet  Outcome: Progressing Towards Goal  Goal: Discharge Planning  Outcome: Progressing Towards Goal  Goal: Medications  Outcome: Progressing Towards Goal  Goal: Respiratory  Outcome: Progressing Towards Goal  Goal: Treatments/Interventions/Procedures  Outcome: Progressing Towards Goal  Goal: Psychosocial  Outcome: Progressing Towards Goal  Goal: *Hemodynamically stable  Outcome: Progressing Towards Goal  Goal: *Stable cardiac rhythm  Outcome: Progressing Towards Goal  Goal: *Lungs clear or at baseline  Outcome: Progressing Towards Goal  Goal: *Optimal pain control at patient's stated goal  Outcome: Progressing Towards Goal  Goal: *Incisions without signs and symptoms of wound complication  Outcome: Progressing Towards Goal  Goal: *Ambulating and performing exercises with assistance  Outcome: Progressing Towards Goal  Goal: *Demonstrates progressive activity  Outcome: Progressing Towards Goal  Goal: *Tolerating diet  Outcome: Progressing Towards Goal     Problem: Cardiac Valve Surgery: Post-Op Day 5  Goal: Off Pathway (Use only if patient is Off Pathway)  Outcome: Progressing Towards Goal  Goal: Activity/Safety  Outcome: Progressing Towards Goal  Goal: Diagnostic Test/Procedures  Outcome: Progressing Towards Goal  Goal: Nutrition/Diet  Outcome: Progressing Towards Goal  Goal: Discharge Planning  Outcome: Progressing Towards Goal  Goal: Medications  Outcome: Progressing Towards Goal  Goal: Respiratory  Outcome: Progressing Towards Goal  Goal: Treatments/Interventions/Procedures  Outcome: Progressing Towards Goal  Goal: Psychosocial  Outcome: Progressing Towards Goal  Goal: *Hemodynamically stable  Outcome: Progressing Towards Goal  Goal: *Stable cardiac rhythm  Outcome: Progressing Towards Goal  Goal: *Lungs clear or at baseline  Outcome: Progressing Towards Goal  Goal: *Optimal pain control at patient's stated goal  Outcome: Progressing Towards Goal  Goal: *Incisions without signs and symptoms of wound complication  Outcome: Progressing Towards Goal  Goal: *Ambulating and performing exercises with assistance  Outcome: Progressing Towards Goal  Goal: *Demonstrates progressive activity  Outcome: Progressing Towards Goal  Goal: *Tolerating diet  Outcome: Progressing Towards Goal     Problem: Cardiac Valve Surgery: Post-Op Day 6  Goal: Off Pathway (Use only if patient is Off Pathway)  Outcome: Progressing Towards Goal  Goal: Activity/Safety  Outcome: Progressing Towards Goal  Goal: Diagnostic Test/Procedures  Outcome: Progressing Towards Goal  Goal: Nutrition/Diet  Outcome: Progressing Towards Goal  Goal: Discharge Planning  Outcome: Progressing Towards Goal  Goal: Medications  Outcome: Progressing Towards Goal  Goal: Respiratory  Outcome: Progressing Towards Goal  Goal: Treatments/Interventions/Procedures  Outcome: Progressing Towards Goal  Goal: Psychosocial  Outcome: Progressing Towards Goal     Problem: Cardiac Valve Surgery: Discharge Outcomes  Goal: *Hemodynamically stable  Outcome: Progressing Towards Goal  Goal: *Stable cardiac rhythm  Outcome: Progressing Towards Goal  Goal: *Lungs clear or at baseline  Outcome: Progressing Towards Goal  Goal: *Demonstrates ability to perform prescribed activity without shortness of breath or discomfort  Outcome: Progressing Towards Goal  Goal: *Verbalizes home exercise program, activity guidelines, cardiac precautions  Outcome: Progressing Towards Goal  Goal: *Optimal pain control at patient's stated goal  Outcome: Progressing Towards Goal  Goal: *Verbalizes understanding and describes prescribed diet  Outcome: Progressing Towards Goal  Goal: *Verbalizes name, dosage, time, side effects, and number of days to continue medications  Outcome: Progressing Towards Goal  Goal: *Weight  is stable  Outcome: Progressing Towards Goal  Goal: *No signs and symptoms of infection or wound complications  Outcome: Progressing Towards Goal  Goal: *Anxiety reduced or absent  Outcome: Progressing Towards Goal  Goal: *Verbalizes and demonstrates incision care  Outcome: Progressing Towards Goal  Goal: *Understands and describes signs and symptoms to report to providers(Stroke Metric)  Outcome: Progressing Towards Goal  Goal: *Describes follow-up/return visits to physicians  Outcome: Progressing Towards Goal  Goal: *Describes available resources and support systems  Outcome: Progressing Towards Goal  Goal: *Expresses feelings about diagnosis and surgery  Outcome: Progressing Towards Goal  Goal: *Influenza immunization  Outcome: Progressing Towards Goal  Goal: *Pneumococcal immunization  Outcome: Progressing Towards Goal

## 2020-09-23 NOTE — DIABETES MGMT
OSCAR CROWE  CLINICAL NURSE SPECIALIST CONSULT  PROGRAM FOR DIABETES HEALTH    FOLLOW UP NOTE    Presentation   Carlita Haley is a 78 y.o. male who presented to the advanced cardiac valve center for evaluation of moderate to severe aortic valve stenosis with progressive c/o fatigue, dyspnea, weakness with 20-30 ln weight gain. HX: CAD s/o CABG x2 and AVR (2014), CKD, CHF, HTN, GERD, A flutter, DM2, partial colectomy, ANDRY, BPH, HLD    DX: Severe aortic stenosis    TX: aortic valve repair    Current clinical course has been uncomplicated. Diabetes: Patient has known Type two diabetes, treated with Humalog Mix 75/25 PTA. A1C 8.6%    Diabetes medication history  Drug class Currently in use Discontinued Never used   Biguanide      DDP-4 inhibitor       Sulfonylurea      Thiazolidinedione Actos 15 mg daily     GLP-1 RA      SGLT-2 inhibitors      Basal insulin      Fixed Dose  Combinations Humalog 75/25 50 units at breakfast and dinner     Bolus insulin        Subjective   Extubated  On NC  Plans to transfer to [facility]  On insulin infusion   Required 45 units insulin yesterday  Objective   Physical exam  General Alert, oriented and in no acute distress/ill-appearing. Conversant and cooperative. Vital Signs   Visit Vitals  /60 (BP 1 Location: Left arm, BP Patient Position: Sitting)   Pulse 61   Temp 97.9 °F (36.6 °C)   Resp 15   Ht 6' (1.829 m)   Wt 138 kg (304 lb 3.2 oz)   SpO2 96%   BMI 41.26 kg/m²     Skin  Warm and dry. No acanthosis noted along neckline. No lipohypertrophy or lipoatrophy noted at injection sites   Heart   Regular rate and rhythm.  No murmurs, rubs or gallops  Lungs  Clear to auscultation without rales or rhonchi  Extremities No foot wounds      Laboratory  Lab Results   Component Value Date/Time    Hemoglobin A1c 8.6 (H) 09/21/2020 09:38 AM    Hemoglobin A1c (POC) 6.0 (A) 01/21/2020 09:07 AM     Lab Results   Component Value Date/Time    LDL, calculated 66 07/30/2020 10:46 AM     Lab Results   Component Value Date/Time    Creatinine 2.03 (H) 09/23/2020 04:20 AM     Lab Results   Component Value Date/Time    Sodium 141 09/23/2020 04:20 AM    Potassium 4.7 09/23/2020 04:20 AM    Chloride 111 (H) 09/23/2020 04:20 AM    CO2 23 09/23/2020 04:20 AM    Anion gap 7 09/23/2020 04:20 AM    Glucose 86 09/23/2020 04:20 AM    BUN 31 (H) 09/23/2020 04:20 AM    Creatinine 2.03 (H) 09/23/2020 04:20 AM    BUN/Creatinine ratio 15 09/23/2020 04:20 AM    GFR est AA 40 (L) 09/23/2020 04:20 AM    GFR est non-AA 33 (L) 09/23/2020 04:20 AM    Calcium 8.4 (L) 09/23/2020 04:20 AM    Bilirubin, total 0.8 09/23/2020 04:20 AM    Alk. phosphatase 40 (L) 09/23/2020 04:20 AM    Protein, total 5.5 (L) 09/23/2020 04:20 AM    Albumin 3.3 (L) 09/23/2020 04:20 AM    Globulin 2.2 09/23/2020 04:20 AM    A-G Ratio 1.5 09/23/2020 04:20 AM    ALT (SGPT) 21 09/23/2020 04:20 AM     Lab Results   Component Value Date/Time    ALT (SGPT) 21 09/23/2020 04:20 AM       Factors affecting BG pattern  Factor Dose Comments   Nutrition:  Carb-controlled meals   60 grams/meal NPO at MN     Blood glucose pattern        Assessment and Plan   Nursing Diagnosis Risk for unstable blood glucose pattern   Nursing Intervention Domain 0710 Decision-making Support   Nursing Interventions Examined current inpatient diabetes control   Explored factors facilitating and impeding inpatient management  Identified self-management practices impeding diabetes control  Explored corrective strategies with patient and responsible inpatient provider   Informed patient of rational for insulin strategy while hospitalized     Evaluation   Rosa M Garnica is a 78year old gentleman with uncontrolled diabetes admitted pre-operatively for insulin adjustment prior to scheduled TAVR for severe aortic stenosis. A1C on admission was 8.6% in actos and high dose humalog mix and will require insulin adjustments in the post-operative period to target inpatient glucose goal of 100-180. At this time, glucose is controlled on an insulin infusion in preparation for TAVR tomorrow morning. Recommendations   Recommend:  1. Insulin infusion per post-operative period    2. Basal insulin 2 hrs prior to d/c infusion when transitioning off. Start with high dose 0.4 units/k units NPH BID.     -Increase to home basal rate the following day (35 units BID) if fasting  glucose over 200     -Decrease dose to 0.3 units/kg if fasting glucose below 100    3. Correctional insulin at resistant sensitivity, start at a glucose of 200    4. If eating well, add mealtime Humalog: 10 units scheduled with meals. Hold if patient consumes less than 50% of carbohydrates on meal tray      Billing Code(s)   I personally reviewed chart, notes, data and current medications in the medical record, and examined the patient at bedside before making care recommendations.      [x] [id number]  subsequent hospital care - 15 minutes      Rollen Goldberg, CNS  Program for Diabetes Health  Access via [facility]  [phone]

## 2020-09-23 NOTE — PROGRESS NOTES
Problem: Self Care Deficits Care Plan (Adult)  Goal: *Acute Goals and Plan of Care (Insert Text)  Description: FUNCTIONAL STATUS PRIOR TO ADMISSION: Patient was independent and active without use of DME.     HOME SUPPORT: The patient lived with wife but did not require assist.    Occupational Therapy Goals  Initiated 9/23/2020  1. Patient will perform ADLs standing 5 mins without fatigue or LOB with supervision/set-up within 5 day(s). 2.  Patient will perform lower body ADLs with supervision/set-up within 5 day(s). 3.  Patient will perform gathering ADL items high and low 2/2 with supervision/set-up within 5 day(s). 4.  Patient will perform toilet transfers with supervision/set-up within 5 day(s). 5.  Patient will perform all aspects of toileting with supervision/set-up within 5 day(s). 6.  Patient will participate in cardiac/sternal upper extremity therapeutic exercise/activities to increase independence with ADLs with supervision/set-up for 5 minutes within 5 day(s). Outcome: Not Met   OCCUPATIONAL THERAPY EVALUATION  Patient: Lilian Valentin (42 y.o. male)  Date: 9/23/2020  Primary Diagnosis: Aortic stenosis [I35.0]  Procedure(s) (LRB):  REDO STERNOTOMY, AORTIC VALVE REPLACEMENT WITH 25MM BIOPROSTHETIC HEART VALVE; RIGHT FEMORAL CUTDOWN FOR CANNULATION; CRISTINA AND EPIAORTIC BY DR. Cookie Akers  (N/A) 1 Day Post-Op   Precautions: fall, sternal        ASSESSMENT  Based on the objective data described below, the patient presents s/p AVR with R femoral cutdown and bilateral foot neuropathy. Patients cardiopulmonary endurance, sternotomy, standing tolerance, girth, functional reach, and pain limit his ability to complete ADLs. Patient redosternotomy so pretty well rehearsed in the process, wife and patient feel confident about discharge home plan.      Current Level of Function Impacting Discharge (ADLs/self-care): mod A overall, standing tolerance 3 mins    Other factors to consider for discharge: Patient lives with wife who can provide assistance as needed     Patient will benefit from skilled therapy intervention to address the above noted impairments. PLAN :  Recommendations and Planned Interventions: self care training, functional mobility training, therapeutic exercise, balance training, therapeutic activities, endurance activities, patient education, and home safety training    Frequency/Duration: Patient will be followed by occupational therapy 5 times a week to address goals. Recommendation for discharge: (in order for the patient to meet his/her long term goals)  To be determined: no needs    This discharge recommendation:  Has not yet been discussed the attending provider and/or case management    IF patient discharges home will need the following DME: TBD        SUBJECTIVE:   Patient stated move in the tube.     OBJECTIVE DATA SUMMARY:   HISTORY:   Past Medical History:   Diagnosis Date    BPH (benign prostatic hypertrophy)     CAD (coronary artery disease)     CHF NYHA class III (symptoms with mildly strenuous activities) ([facility] [address] 10/24/2014    Chronic kidney disease     diabetic kidneys    CKD (chronic kidney disease), stage III ([facility] [address] 4/2/2014    Coagulation disorder ([facility] [address]     PLAVIX    DM (diabetes mellitus) ([facility] [address] 7/11/2011    Endocarditis of aortic valve 10/6/2014    GERD (gastroesophageal reflux disease)     HTN (hypertension) 7/11/2011    Hyperlipidemia 7/11/2011    Hypovitaminosis D 4/16/2014    Kidney stone 7/11/2011     Past Surgical History:   Procedure Laterality Date    CARDIAC SURG PROCEDURE UNLIST  10/2014    CABG x 2 & AVR    HX CHOLECYSTECTOMY  1989    HX COLECTOMY  1989    diverticulits    HX HEENT      tonsils & adnoids    HX HEENT      wisdom teeth    HX OTHER SURGICAL      dilation of esophagus    HX OTHER SURGICAL      fistulectomy       Expanded or extensive additional review of patient history:     Home Situation  Home Environment: Private residence  # Steps to Enter: 3  One/Two Story Residence: Two story  # of Interior Steps: 14  Living Alone: No  Support Systems: Family member(s)  Patient Expects to be Discharged to[de-identified] Private residence  Current DME Used/Available at Home: None    Hand dominance: Right    EXAMINATION OF PERFORMANCE DEFICITS:  Cognitive/Behavioral Status:  Neurologic State: Alert  Orientation Level: Oriented X4  Cognition: Follows commands        Safety/Judgement: Good awareness of safety precautions(verbalized understanding of \"move in tube\" precautions )    Skin: intact    Edema: bilateral hands, otherwise intact    Hearing: Auditory  Auditory Impairment: None    Vision/Perceptual:                           Acuity: Impaired far vision    Corrective Lenses: Glasses    Range of Motion:  Bilateral shoulder flexion: 150*  Bilateral elbow flexion: 130*  Wrist ROM limited by lines and leads   AROM: Generally decreased, functional  PROM: Generally decreased, functional                      Strength:  Did not complete MMT due to sternotomy; infer WFL MMT based on sit <> stand transfer   Strength: Generally decreased, functional                Coordination:  Coordination: Generally decreased, functional  Fine Motor Skills-Upper: Right Intact(TBD; used R hand to open container and L hand to hold urinal)       Tone & Sensation:    Tone: Normal  Sensation: Impaired(neuropathy in feet; bilateral hands intact )                      Balance:     Sitting: intact  Standing: Impaired; Without support  Standing - Static: Fair  Standing - Dynamic : Occasional one hand on supportive surface    Functional Mobility and Transfers for ADLs:  Bed Mobility:   Did not assess at this time     Transfers:  Sit to Stand: Minimum assistance  Stand to Sit: Minimum assistance  Toilet Transfer :  Moderate assistance infer from chair   Tub Transfer: total assist 2* not safe at this time     ADL Assessment:  Feeding: Modified independent(increased time, was able to open containers )    Oral Facial Hygiene/Grooming: setup on beside tray    Bathing: Maximum assistance infer    Upper Body Dressing: Moderate assistance infer 2* ROM    Lower Body Dressing: Moderate assistance infer 2* tailor sitting with no cues not to pull, fair standing balance  Toileting: Maximum assistance 2* functional reach for clothing management  Demonstrated by sit <> stand transfer to urinate into urinal; did not lose balance when reaching for urinal or holding urinal                 ADL Intervention and task modifications:                                  Cognitive Retraining  Safety/Judgement: Good awareness of safety precautions(verbalized understanding of \"move in tube\" precautions )    Instruction to avoid holding breath during BM, exercise, or functional mobility. Increase activity tolerance for home by pacing self with sitting duration, frequency OOB, standing, and ADLs. Instructed and indicated understanding of s/s of too much activity, how to respond to s/s safely. Occupational Therapy Evaluation Charge Determination   History Examination Decision-Making   LOW Complexity : Brief history review  LOW Complexity : 1-3 performance deficits relating to physical, cognitive , or psychosocial skils that result in activity limitations and / or participation restrictions  MEDIUM Complexity : Patient may present with comorbidities that affect occupational performnce. Miniml to moderate modification of tasks or assistance (eg, physical or verbal ) with assesment(s) is necessary to enable patient to complete evaluation       Based on the above components, the patient evaluation is determined to be of the following complexity leve: LOW  Pain Rating:  None identified, discomfort with ROM     Activity Tolerance:   Fair and requires rest breaks  Please refer to the flowsheet for vital signs taken during this treatment.   Vitals:    09/23/20 0900 09/23/20 0904 09/23/20 1000 09/23/20 1100   BP: (!) 135/110 (!) 138/55 (!) 137/58 135/68   BP 1 Location:       BP Patient Position:  Standing     Pulse: 70 (!) 58 64 62   Resp: 16  14 13   Temp:       SpO2: 92% 98% 99% 100%   Weight:       Height:       2L NC    After treatment patient left in no apparent distress:    Sitting in chair, Call bell within reach, and Caregiver / family present    COMMUNICATION/EDUCATION:   The patients plan of care was discussed with: Registered nurse. Home safety education was provided and the patient/caregiver indicated understanding. and Patient/family have participated as able in goal setting and plan of care. This patients plan of care is appropriate for delegation to [facility].     Thank you for this referral.  Alem Motley  Time Calculation: 16 mins

## 2020-09-23 NOTE — OP NOTES
[facility]  OPERATIVE REPORT    Name:  Patrica Schwab  MR#:  [id number]  :  1950  ACCOUNT #:  [de-identified]  DATE OF SERVICE:  2020    PREOPERATIVE DIAGNOSES:  1. Aortic stenosis. 2.  Prosthetic valve degeneration. POSTOPERATIVE DIAGNOSES:  1. Aortic stenosis. 2.  Prosthetic valve degeneration. PROCEDURES PERFORMED:  1. Aortic valve replacement using a 25 mm tissue valve (CPT code 55763). 2.  Redo median sternotomy after previous aortic valve replacements (CPT code 15135). 3.  Cut down of the right common femoral artery and common femoral vein for the purposes of peripheral cardiopulmonary bypass (CPT code 01642). SURGEON:  Erlinda Crisotbal MD    ASSISTANT:  KY Pelayo; ; JOSIAS assistance was needed due to difficulty of procedure, dissection, and identification of pertinent anatomy throughout the case       ANESTHESIA:  General endotracheal anesthesia. COMPLICATIONS:  None. SPECIMENS REMOVED:  Previously placed prosthetic aortic valve. IMPLANTS:  25 mm tissue valve. ESTIMATED BLOOD LOSS:  50 mL    PROCEDURE:  The patient is a very pleasant 71-year-old gentleman who is suffering from severe aortic stenosis related to prosthetic aortic valve degeneration. He is now being brought to the operating room to undergo re-aortic valve replacement. The patient was brought to the operating room, had a right radial A-line placed without complications, Trenton-Ana catheter placed without complications, general endotracheal anesthesia without complications. Chest, abdomen, and lower extremities were prepped and draped in the usual fashion. A midline incision was made on the patient's sternum. Cautery dissection was used to dissect down to the level of sternal bone and the sternal bone with a redo saw and the adhesions were then dissected out.   During this time, we also dissected out the right common femoral vein and artery and eventually cannulated with a 21-Swedish femoral arterial cannula as well as a 25-Swedish venous cannula. These were hooked up to the cardiopulmonary bypass circuit. We had exposed the ascending aorta and we had previously placed a coronary sinus catheter and PA vent per Anesthesia. We then went on with cardiopulmonary bypass, placed an antegrade cardioplegia needle, de-aired the cardioplegia line, hooked up, pumped it down with cross-clamp, pumped back up again. We gave 750 mL of cardioplegia; however, required subsequently 400 mL. We then made a curvilinear incision in the ascending aorta, removed the previously placed prosthetic valve, sized it to a 25 mm valve, placed 2-0 Ti-Cron sutures circumferentially around the aortic valve annulus and tied it in. I then used a double running 4-0 Prolene suture to close the ascending aorta. Care was taken to de-air the ascending aorta before tying this down. We then brought the head of the bed up, pumped it down with the cross-clamp, pumped it back up again. Once the temperature reached 36.2, we successfully came off the coronary artery bypass. We placed A and V wires, AC locators and Arturo drains. I was present for the entire procedure. There was minimal gradient at the end.       Travis Santiago MD      [id number]  D:  09/23/2020 10:13  T:  09/23/2020 18:49  JOB #:  [id number]

## 2020-09-23 NOTE — WOUND CARE
WOCN Note:  
 
New consult placed for assessment of left plantar foot wound. Assessed in room 4338. PPE: face shield, mask and gloves Suzanna Angulo RN at the bedside Chart reviewed. Admitted DX: Aortic stenosis S/P redo Sternotomy, aortic valve replacement; dressing dry and intact. Past Medical History:  
Diagnosis Date  BPH (benign prostatic hypertrophy)  CAD (coronary artery disease)  CHF NYHA class III (symptoms with mildly strenuous activities) ([facility] [address] 10/24/2014  Chronic kidney disease   
 diabetic kidneys  CKD (chronic kidney disease), stage III ([facility] [address] 4/2/2014  Coagulation disorder ([facility] [address] PLAVIX  DM (diabetes mellitus) ([facility] [address] 7/11/2011  Endocarditis of aortic valve 10/6/2014  GERD (gastroesophageal reflux disease)  HTN (hypertension) 7/11/2011  Hyperlipidemia 7/11/2011  Hypovitaminosis D 4/16/2014  Kidney stone 7/11/2011  
' Assessment:  
Patient is A&O x 3, communicative and sitting up in chair. Bed: ccu/cvi/icu edinson isolibrium gel Patient reports no pain. Heels intact without erythema. 1. POA Left plantar foot, DFU: 1.8 x 1.2 x 0 cm  100% dry flat maroon; no exudate, odor or erythema. Wound, Pressure Prevention & Skin Care Recommendations: 1. Minimize layers of linen/pads under patient to optimize support surface. 2.  Turn/reposition approximately every 2 hours and offload heels. 3.  Manage moisture/ Keep skin folds clean and dry. 4.  Left plantar foot wound:  Daily paint with betadine and leave open to air. Discussed above plan with patient and Romain Nicole. Transition of Care: Plan to follow as needed while admitted to hospital. 
 
Sonu Kemp, ANA MARIAN RN [facility] Inpatient Wound Care Available on [employer] Pager [phone] Office [phone]

## 2020-09-23 NOTE — PROGRESS NOTES
AUBREY PLAN:    RUR-23%    Patient had a REDO STERNOTOMY on 9/22/20. Patient was admitted from home and will be discharged home in care of his wife    Patient may need PT/OT at home. Awaiting PT evaluation, no skilled OT needed    Family will transport.     Xiomy Aquino MSA, RN,CRM

## 2020-09-23 NOTE — PROGRESS NOTES
Problem: Mobility Impaired (Adult and Pediatric)  Goal: *Acute Goals and Plan of Care (Insert Text)  Description: FUNCTIONAL STATUS PRIOR TO ADMISSION: Patient was independent and active without use of DME.     HOME SUPPORT PRIOR TO ADMISSION: The patient lived with wife but did not require assist.    Physical Therapy Goals  Initiated 9/23/2020  1. Patient will move from supine to sit and sit to supine , scoot up and down, and roll side to side in bed with modified independence within 5 days. 2.  Patient will perform sit to/from stand with modified independence within 5 days. 3.  Patient will ambulate 300 feet with least restrictive assistive device and independence within 5 days. 4.  Patient will ascend/descend 12 stairs with handrail(s) with modified independence within 5 days. 5.  Patient will perform cardiac exercises per protocol with independence within 5 days. 6.  Patient will verbally recall and functionally demonstrate mindful-based movements (\"move in the tube\") principles without cues within 5 days. Outcome: Not Met     PHYSICAL THERAPY EVALUATION  Patient: Rut Sears (42 y.o. male)  Date: 9/23/2020  Primary Diagnosis: Aortic stenosis [I35.0]  Procedure(s) (LRB):  REDO STERNOTOMY, AORTIC VALVE REPLACEMENT WITH 25MM BIOPROSTHETIC HEART VALVE; RIGHT FEMORAL CUTDOWN FOR CANNULATION; CRISTINA AND EPIAORTIC BY DR. Adiel Mcdonough  (N/A) 1 Day Post-Op   Precautions:  Fall      ASSESSMENT  Based on the objective data described below, the patient presents with impaired functional mobility compared to baseline secondary to redo sternotomy, avr.  Currently functional mobility is limited by sternal precautions (move in the tube), pain (5/10 sternal), decreased activity tolerance (fatigued & increased resp w/ ambulation), generally decreased strength, mildly impaired dynamic stand balance. Patient is at overall min assist for transfers and to ambulate. He ambulated 75 ft with, two standing rest breaks. Anticipate steady functional progress towards PLOF with therapy. Currently recommending home with HHPT. Patient is verbalizing and is demonstrating understanding of mindful-based movements (\"move in the tube\") principles of keeping UEs proximal to ribcage to prevent lateral pull on the sternum during load-bearing activities with verbal cues required for compliance. Also instructed in chest splinting for coughs, sneezing, hiccups; role of PT; and activity progression. Current Level of Function Impacting Discharge (mobility/balance): Min assist    Functional Outcome Measure: The patient scored 19/28 on the Tinetti outcome measure which is indicative of moderate fall risk. Other factors to consider for discharge: new sternotomy; lives with wife; PLOF indep     Patient will benefit from skilled therapy intervention to address the above noted impairments. PLAN :  Recommendations and Planned Interventions: bed mobility training, transfer training, gait training, therapeutic exercises, patient and family training/education, and therapeutic activities      Frequency/Duration: Patient will be followed by physical therapy:  5 times a week to address goals. Recommendation for discharge: (in order for the patient to meet his/her long term goals)  Physical therapy at least 2 days/week in the home     This discharge recommendation:  Has not yet been discussed the attending provider and/or case management    IF patient discharges home will need the following DME: none anticipated         SUBJECTIVE:   Patient stated Is it normal for my heart to beat fast  (pt voiced after walking)    OBJECTIVE DATA SUMMARY:   Patient mobilized on continuous portable monitor/telemetry.   HISTORY:    Past Medical History:   Diagnosis Date    BPH (benign prostatic hypertrophy)     CAD (coronary artery disease)     CHF NYHA class III (symptoms with mildly strenuous activities) ([facility] [address] 10/24/2014    Chronic kidney disease diabetic kidneys    CKD (chronic kidney disease), stage III ([facility] [address] 4/2/2014    Coagulation disorder (HCC)     PLAVIX    DM (diabetes mellitus) ([facility] [address] 7/11/2011    Endocarditis of aortic valve 10/6/2014    GERD (gastroesophageal reflux disease)     HTN (hypertension) 7/11/2011    Hyperlipidemia 7/11/2011    Hypovitaminosis D 4/16/2014    Kidney stone 7/11/2011     Past Surgical History:   Procedure Laterality Date    CARDIAC SURG PROCEDURE UNLIST  10/2014    CABG x 2 & AVR    HX CHOLECYSTECTOMY  1989    HX COLECTOMY  1989    diverticulits    HX HEENT      tonsils & adnoids    HX HEENT      wisdom teeth    HX OTHER SURGICAL      dilation of esophagus    HX OTHER SURGICAL      fistulectomy       Personal factors and/or comorbidities impacting plan of care: PMH    Home Situation  Home Environment: Private residence  # Steps to Enter: 3  One/Two Story Residence: Two story  # of Interior Steps: 14  Living Alone: No  Support Systems: Family member(s)  Patient Expects to be Discharged to[de-identified] Private residence  Current DME Used/Available at Home: None    EXAMINATION/PRESENTATION/DECISION MAKING:     Critical Behavior:  Neurologic State: Alert  Orientation Level: Oriented X4  Cognition: Follows commands  Safety/Judgement: Good awareness of safety precautions(verbalized understanding of \"move in tube\" precautions )  Hearing:   Auditory  Auditory Impairment: None  Skin:  Dressing intact, dry  Edema: none  Range Of Motion:  AROM: Generally decreased, functional           PROM: Generally decreased, functional           Strength:    Strength: Generally decreased, functional                    Tone & Sensation:   Tone: Normal              Sensation: Impaired(neuropathy jimmie feet)               Coordination:  Coordination: Generally decreased, functional  Vision:   Acuity: Impaired far vision  Corrective Lenses: Glasses  Functional Mobility:  Bed Mobility:  N/t* pt sitting up in the chair & remained sitting after session Transfers:  Sit to Stand: Minimum assistance(three rocks for momentum; arms folded across chest; completed on first attempt w/ min assist)  Stand to Sit: Minimum assistance(hands on thighs; min assist for descent control)                       Balance:   Sitting: Intact; Without support(sitting in chair)  Sitting - Static: Fair (occasional)  Sitting - Dynamic: Fair (occasional)  Standing: Impaired; Without support  Standing - Static: Fair;Good  Standing - Dynamic : Fair  Ambulation/Gait Training:  Distance (ft): 125 Feet (ft)  Assistive Device: Gait belt  Ambulation - Level of Assistance: Minimal assistance;Assist x1(w/ assist of another for equipment)     Gait Description (WDL): Exceptions to WDL  Gait Abnormalities: Decreased step clearance; Altered arm swing;Trunk sway increased        Base of Support: Widened     Speed/Sabi: Slow;Pace decreased (<100 feet/min)  Step Length: Right shortened;Left shortened     Instructed in paced activity taking standing rest breaks and cues for slowed breathing. Cardiac diagnosis intervention:  Patient instructed and educated on mindful movement principles based on Move in The Tube concept to include maintaining bilateral elbows close to rib cage when performing any load-bearing activity such as getting in/out of bed, pushing up from a chair, opening a door, or lifting a box. Patient knowledgeable from a prior surgery and with good follow through during mobility. Therapeutic Exercises:   Patient instructed on the benefits and demonstrated cardiac exercises while seated today with Supervision. Instructed and indicated understanding on how to progress reps. Patient reports performing arm raises with OT earlier today.   CARDIAC   EXERCISE    Sets    Reps    Active  Active Assist    Passive  Self ROM    Comments    Shoulder flexion  1  5   []                            []                             []                             [] Shoulder abduction  1  5  []                             []                             []                             []                                Scapular elevation  1  5  [x]                             []                              []                             []                                Scapular retraction  1  5  []                             []                             []                             []                                Trunk rotation  1  5  []                             []                             []                             []                                Trunk sidebending  1  5  []                             []                              []                             []                                          Functional Measure:  Tinetti test:    Sitting Balance: 1  Arises: 0  Attempts to Rise: 0  Immediate Standing Balance: 2  Standing Balance: 1  Nudged: 2  Eyes Closed: 0  Turn 360 Degrees - Continuous/Discontinuous: 1  Turn 360 Degrees - Steady/Unsteady: 1  Sitting Down: 1  Balance Score: 9 Balance total score  Indication of Gait: 1  R Step Length/Height: 1  L Step Length/Height: 1  R Foot Clearance: 1  L Foot Clearance: 1  Step Symmetry: 1  Step Continuity: 1  Path: 2  Trunk: 1  Walking Time: 0  Gait Score: 10 Gait total score  Total Score: 19/28 Overall total score         Tinetti Tool Score Risk of Falls  <19 = High Fall Risk  19-24 = Moderate Fall Risk  25-28 = Low Fall Risk  Tinetti ME. Performance-Oriented Assessment of Mobility Problems in Elderly Patients. Shine [employer]; [id number].  (Scoring Description: PT Bulletin Feb. 10, 1993)    Older adults: Yue Zuniga et al, 2009; n = 1000 [facility] elderly evaluated with ABC, YESSY, ADL, and IADL)  · Mean YESSY score for males aged 69-68 years = 26.21(3.40)  · Mean YESSY score for females age 69-68 years = 25.16(4.30)  · Mean YESSY score for males over 80 years = 23.29(6.02)  · Mean YESSY score for females over 80 years = 17.20(8.32)            Physical Therapy Evaluation Charge Determination   History Examination Presentation Decision-Making   LOW Complexity : Zero comorbidities / personal factors that will impact the outcome / POC LOW Complexity : 1-2 Standardized tests and measures addressing body structure, function, activity limitation and / or participation in recreation  LOW Complexity : Stable, uncomplicated  LOW Complexity : FOTO score of       Based on the above components, the patient evaluation is determined to be of the following complexity level: LOW     Pain Ratin/10 post op pain at rest; increased with activity though did not limit participation. Activity Tolerance:   Fair and increased respirations with ambulation. Improved with standing rest and cues. Please refer to the flowsheet for vital signs taken during this treatment. After treatment patient left in no apparent distress:   Sitting in chair and Call bell within reach    COMMUNICATION/EDUCATION:   The patients plan of care was discussed with: Registered nurse. Fall prevention education was provided and the patient/caregiver indicated understanding., Patient/family have participated as able in goal setting and plan of care. , and Patient/family agree to work toward stated goals and plan of care.     Thank you for this referral.  Ludwig Hobbs, PT   Time Calculation: 20 mins

## 2020-09-24 ENCOUNTER — HOME HEALTH ADMISSION (OUTPATIENT)
Dept: HOME HEALTH SERVICES | Facility: HOME HEALTH | Age: 70
End: 2020-09-24
Payer: MEDICARE

## 2020-09-24 ENCOUNTER — APPOINTMENT (OUTPATIENT)
Dept: GENERAL RADIOLOGY | Age: 70
DRG: 220 | End: 2020-09-24
Attending: NURSE PRACTITIONER
Payer: MEDICARE

## 2020-09-24 LAB
ADMINISTERED INITIALS, ADMINIT: NORMAL
ALBUMIN SERPL-MCNC: 3.3 G/DL (ref 3.5–5)
ALBUMIN/GLOB SERPL: 1.2 {RATIO} (ref 1.1–2.2)
ALP SERPL-CCNC: 44 U/L (ref 45–117)
ALT SERPL-CCNC: 15 U/L (ref 12–78)
ANION GAP SERPL CALC-SCNC: 9 MMOL/L (ref 5–15)
AST SERPL-CCNC: 42 U/L (ref 15–37)
BILIRUB SERPL-MCNC: 0.9 MG/DL (ref 0.2–1)
BUN SERPL-MCNC: 31 MG/DL (ref 6–20)
BUN/CREAT SERPL: 15 (ref 12–20)
CALCIUM SERPL-MCNC: 8.6 MG/DL (ref 8.5–10.1)
CARB RATIO, CHOR: 15
CARBOHYDRATE EATEN, CHO: 14 G
CARBOHYDRATE EATEN, CHO: 29 G
CARBOHYDRATE EATEN, CHO: 51 G
CHLORIDE SERPL-SCNC: 107 MMOL/L (ref 97–108)
CO2 SERPL-SCNC: 22 MMOL/L (ref 21–32)
CREAT SERPL-MCNC: 2.08 MG/DL (ref 0.7–1.3)
D50 ADMINISTERED, D50ADM: 0 ML
D50 ORDER, D50ORD: 0 ML
ERYTHROCYTE [DISTWIDTH] IN BLOOD BY AUTOMATED COUNT: 14.6 % (ref 11.5–14.5)
GLOBULIN SER CALC-MCNC: 2.8 G/DL (ref 2–4)
GLSCOM COMMENTS: NORMAL
GLUCOSE BLD STRIP.AUTO-MCNC: 110 MG/DL (ref 65–100)
GLUCOSE BLD STRIP.AUTO-MCNC: 113 MG/DL (ref 65–100)
GLUCOSE BLD STRIP.AUTO-MCNC: 119 MG/DL (ref 65–100)
GLUCOSE BLD STRIP.AUTO-MCNC: 125 MG/DL (ref 65–100)
GLUCOSE BLD STRIP.AUTO-MCNC: 127 MG/DL (ref 65–100)
GLUCOSE BLD STRIP.AUTO-MCNC: 132 MG/DL (ref 65–100)
GLUCOSE BLD STRIP.AUTO-MCNC: 138 MG/DL (ref 65–100)
GLUCOSE BLD STRIP.AUTO-MCNC: 144 MG/DL (ref 65–100)
GLUCOSE BLD STRIP.AUTO-MCNC: 146 MG/DL (ref 65–100)
GLUCOSE BLD STRIP.AUTO-MCNC: 158 MG/DL (ref 65–100)
GLUCOSE BLD STRIP.AUTO-MCNC: 162 MG/DL (ref 65–100)
GLUCOSE BLD STRIP.AUTO-MCNC: 163 MG/DL (ref 65–100)
GLUCOSE BLD STRIP.AUTO-MCNC: 171 MG/DL (ref 65–100)
GLUCOSE BLD STRIP.AUTO-MCNC: 90 MG/DL (ref 65–100)
GLUCOSE BLD STRIP.AUTO-MCNC: 91 MG/DL (ref 65–100)
GLUCOSE BLD STRIP.AUTO-MCNC: 94 MG/DL (ref 65–100)
GLUCOSE BLD STRIP.AUTO-MCNC: 98 MG/DL (ref 65–100)
GLUCOSE SERPL-MCNC: 92 MG/DL (ref 65–100)
GLUCOSE, GLC: 110 MG/DL
GLUCOSE, GLC: 113 MG/DL
GLUCOSE, GLC: 119 MG/DL
GLUCOSE, GLC: 125 MG/DL
GLUCOSE, GLC: 127 MG/DL
GLUCOSE, GLC: 132 MG/DL
GLUCOSE, GLC: 144 MG/DL
GLUCOSE, GLC: 146 MG/DL
GLUCOSE, GLC: 158 MG/DL
GLUCOSE, GLC: 163 MG/DL
GLUCOSE, GLC: 171 MG/DL
GLUCOSE, GLC: 90 MG/DL
GLUCOSE, GLC: 91 MG/DL
GLUCOSE, GLC: 94 MG/DL
GLUCOSE, GLC: 98 MG/DL
HCT VFR BLD AUTO: 39.9 % (ref 36.6–50.3)
HGB BLD-MCNC: 12.9 G/DL (ref 12.1–17)
HIGH TARGET, HITG: 130 MG/DL
INSULIN ADMINSTERED, INSADM: 1 UNITS/HOUR
INSULIN ADMINSTERED, INSADM: 1.3 UNITS/HOUR
INSULIN ADMINSTERED, INSADM: 1.5 UNITS/HOUR
INSULIN ADMINSTERED, INSADM: 1.7 UNITS/HOUR
INSULIN ADMINSTERED, INSADM: 1.9 UNITS/HOUR
INSULIN ADMINSTERED, INSADM: 1.9 UNITS/HOUR
INSULIN ADMINSTERED, INSADM: 4.1 UNITS/HOUR
INSULIN ADMINSTERED, INSADM: 4.2 UNITS/HOUR
INSULIN ADMINSTERED, INSADM: 4.6 UNITS/HOUR
INSULIN ADMINSTERED, INSADM: 4.6 UNITS/HOUR
INSULIN ADMINSTERED, INSADM: 5.3 UNITS/HOUR
INSULIN BOLUS ADMINISTERED, INSBOLADM: 0.9 UNITS/HOUR
INSULIN BOLUS ADMINISTERED, INSBOLADM: 1.9 UNITS/HOUR
INSULIN BOLUS ADMINISTERED, INSBOLADM: 3.4 UNITS/HOUR
INSULIN BOLUS ORDERED, INSBOLORD: 0.9 UNITS/HOUR
INSULIN BOLUS ORDERED, INSBOLORD: 1.9 UNITS/HOUR
INSULIN BOLUS ORDERED, INSBOLORD: 3.4 UNITS/HOUR
INSULIN ORDER, INSORD: 1 UNITS/HOUR
INSULIN ORDER, INSORD: 1.3 UNITS/HOUR
INSULIN ORDER, INSORD: 1.5 UNITS/HOUR
INSULIN ORDER, INSORD: 1.7 UNITS/HOUR
INSULIN ORDER, INSORD: 1.9 UNITS/HOUR
INSULIN ORDER, INSORD: 1.9 UNITS/HOUR
INSULIN ORDER, INSORD: 4.1 UNITS/HOUR
INSULIN ORDER, INSORD: 4.2 UNITS/HOUR
INSULIN ORDER, INSORD: 4.6 UNITS/HOUR
INSULIN ORDER, INSORD: 4.6 UNITS/HOUR
INSULIN ORDER, INSORD: 5.3 UNITS/HOUR
LOW TARGET, LOT: 95 MG/DL
MAGNESIUM SERPL-MCNC: 2.3 MG/DL (ref 1.6–2.4)
MCH RBC QN AUTO: 30.6 PG (ref 26–34)
MCHC RBC AUTO-ENTMCNC: 32.3 G/DL (ref 30–36.5)
MCV RBC AUTO: 94.5 FL (ref 80–99)
MINUTES UNTIL NEXT BG, NBG: 60 MIN
MULTIPLIER, MUL: 0.03
MULTIPLIER, MUL: 0.04
MULTIPLIER, MUL: 0.04
MULTIPLIER, MUL: 0.05
MULTIPLIER, MUL: 0.06
NRBC # BLD: 0 K/UL (ref 0–0.01)
NRBC BLD-RTO: 0 PER 100 WBC
ORDER INITIALS, ORDINIT: NORMAL
PLATELET # BLD AUTO: 82 K/UL (ref 150–400)
PMV BLD AUTO: 10.2 FL (ref 8.9–12.9)
POTASSIUM SERPL-SCNC: 4.2 MMOL/L (ref 3.5–5.1)
PROT SERPL-MCNC: 6.1 G/DL (ref 6.4–8.2)
RBC # BLD AUTO: 4.22 M/UL (ref 4.1–5.7)
SERVICE CMNT-IMP: ABNORMAL
SERVICE CMNT-IMP: NORMAL
SODIUM SERPL-SCNC: 138 MMOL/L (ref 136–145)
WBC # BLD AUTO: 12.6 K/UL (ref 4.1–11.1)

## 2020-09-24 PROCEDURE — 83735 ASSAY OF MAGNESIUM: CPT

## 2020-09-24 PROCEDURE — 65660000001 HC RM ICU INTERMED STEPDOWN

## 2020-09-24 PROCEDURE — 74011250637 HC RX REV CODE- 250/637: Performed by: NURSE PRACTITIONER

## 2020-09-24 PROCEDURE — 97535 SELF CARE MNGMENT TRAINING: CPT

## 2020-09-24 PROCEDURE — 82962 GLUCOSE BLOOD TEST: CPT

## 2020-09-24 PROCEDURE — 74011250637 HC RX REV CODE- 250/637: Performed by: THORACIC SURGERY (CARDIOTHORACIC VASCULAR SURGERY)

## 2020-09-24 PROCEDURE — 74011250636 HC RX REV CODE- 250/636: Performed by: NURSE PRACTITIONER

## 2020-09-24 PROCEDURE — 97116 GAIT TRAINING THERAPY: CPT

## 2020-09-24 PROCEDURE — 74011000250 HC RX REV CODE- 250: Performed by: NURSE PRACTITIONER

## 2020-09-24 PROCEDURE — 71045 X-RAY EXAM CHEST 1 VIEW: CPT

## 2020-09-24 PROCEDURE — 74011636637 HC RX REV CODE- 636/637: Performed by: NURSE PRACTITIONER

## 2020-09-24 PROCEDURE — 97530 THERAPEUTIC ACTIVITIES: CPT

## 2020-09-24 PROCEDURE — 74011000258 HC RX REV CODE- 258: Performed by: NURSE PRACTITIONER

## 2020-09-24 PROCEDURE — 80053 COMPREHEN METABOLIC PANEL: CPT

## 2020-09-24 PROCEDURE — 36415 COLL VENOUS BLD VENIPUNCTURE: CPT

## 2020-09-24 PROCEDURE — 85027 COMPLETE CBC AUTOMATED: CPT

## 2020-09-24 RX ORDER — INSULIN LISPRO 100 [IU]/ML
INJECTION, SOLUTION INTRAVENOUS; SUBCUTANEOUS
Status: DISCONTINUED | OUTPATIENT
Start: 2020-09-24 | End: 2020-09-29 | Stop reason: HOSPADM

## 2020-09-24 RX ORDER — AMLODIPINE BESYLATE 5 MG/1
5 TABLET ORAL DAILY
Status: DISCONTINUED | OUTPATIENT
Start: 2020-09-24 | End: 2020-09-24

## 2020-09-24 RX ORDER — AMLODIPINE BESYLATE 5 MG/1
10 TABLET ORAL DAILY
Status: DISCONTINUED | OUTPATIENT
Start: 2020-09-25 | End: 2020-09-29 | Stop reason: HOSPADM

## 2020-09-24 RX ORDER — HYDRALAZINE HYDROCHLORIDE 20 MG/ML
20 INJECTION INTRAMUSCULAR; INTRAVENOUS
Status: DISCONTINUED | OUTPATIENT
Start: 2020-09-24 | End: 2020-09-29 | Stop reason: HOSPADM

## 2020-09-24 RX ORDER — AMLODIPINE BESYLATE 5 MG/1
5 TABLET ORAL ONCE
Status: COMPLETED | OUTPATIENT
Start: 2020-09-24 | End: 2020-09-24

## 2020-09-24 RX ORDER — ACETAMINOPHEN 325 MG/1
650 TABLET ORAL
Status: DISCONTINUED | OUTPATIENT
Start: 2020-09-24 | End: 2020-09-29 | Stop reason: HOSPADM

## 2020-09-24 RX ORDER — AMLODIPINE BESYLATE 5 MG/1
10 TABLET ORAL ONCE
Status: COMPLETED | OUTPATIENT
Start: 2020-09-24 | End: 2020-09-24

## 2020-09-24 RX ADMIN — PANTOPRAZOLE SODIUM 40 MG: 40 TABLET, DELAYED RELEASE ORAL at 07:07

## 2020-09-24 RX ADMIN — AMLODIPINE BESYLATE 5 MG: 5 TABLET ORAL at 08:49

## 2020-09-24 RX ADMIN — CHLORHEXIDINE GLUCONATE 10 ML: 1.2 RINSE ORAL at 21:08

## 2020-09-24 RX ADMIN — DOCUSATE SODIUM 50MG AND SENNOSIDES 8.6MG 1 TABLET: 8.6; 5 TABLET, FILM COATED ORAL at 08:49

## 2020-09-24 RX ADMIN — ATORVASTATIN CALCIUM 40 MG: 40 TABLET, FILM COATED ORAL at 08:49

## 2020-09-24 RX ADMIN — CHLORHEXIDINE GLUCONATE 10 ML: 1.2 RINSE ORAL at 08:57

## 2020-09-24 RX ADMIN — MAGNESIUM OXIDE 400 MG: 400 TABLET ORAL at 08:49

## 2020-09-24 RX ADMIN — Medication 10 ML: at 21:08

## 2020-09-24 RX ADMIN — AMIODARONE HYDROCHLORIDE 400 MG: 200 TABLET ORAL at 21:08

## 2020-09-24 RX ADMIN — POLYETHYLENE GLYCOL 3350 17 G: 17 POWDER, FOR SOLUTION ORAL at 08:48

## 2020-09-24 RX ADMIN — MUPIROCIN: 20 OINTMENT TOPICAL at 08:57

## 2020-09-24 RX ADMIN — HUMAN INSULIN 28 UNITS: 100 INJECTION, SUSPENSION SUBCUTANEOUS at 17:25

## 2020-09-24 RX ADMIN — AMLODIPINE BESYLATE 10 MG: 5 TABLET ORAL at 19:19

## 2020-09-24 RX ADMIN — HYDRALAZINE HYDROCHLORIDE 20 MG: 20 INJECTION INTRAMUSCULAR; INTRAVENOUS at 23:19

## 2020-09-24 RX ADMIN — AMLODIPINE BESYLATE 5 MG: 5 TABLET ORAL at 14:35

## 2020-09-24 RX ADMIN — Medication 10 ML: at 14:03

## 2020-09-24 RX ADMIN — ACETAMINOPHEN 650 MG: 325 TABLET ORAL at 16:27

## 2020-09-24 RX ADMIN — MUPIROCIN: 20 OINTMENT TOPICAL at 17:28

## 2020-09-24 RX ADMIN — DOCUSATE SODIUM 50MG AND SENNOSIDES 8.6MG 1 TABLET: 8.6; 5 TABLET, FILM COATED ORAL at 17:28

## 2020-09-24 RX ADMIN — OXYCODONE 10 MG: 5 TABLET ORAL at 08:55

## 2020-09-24 RX ADMIN — ACETAMINOPHEN 650 MG: 325 TABLET ORAL at 11:56

## 2020-09-24 RX ADMIN — AMIODARONE HYDROCHLORIDE 400 MG: 200 TABLET ORAL at 08:49

## 2020-09-24 RX ADMIN — MAGNESIUM OXIDE 400 MG: 400 TABLET ORAL at 17:27

## 2020-09-24 RX ADMIN — Medication 10 ML: at 07:07

## 2020-09-24 RX ADMIN — OXYCODONE 5 MG: 5 TABLET ORAL at 14:00

## 2020-09-24 RX ADMIN — HYDRALAZINE HYDROCHLORIDE 20 MG: 20 INJECTION INTRAMUSCULAR; INTRAVENOUS at 15:44

## 2020-09-24 RX ADMIN — SODIUM CHLORIDE 1.7 UNITS/HR: 900 INJECTION, SOLUTION INTRAVENOUS at 06:49

## 2020-09-24 RX ADMIN — INSULIN LISPRO 1 UNITS: 100 INJECTION, SOLUTION INTRAVENOUS; SUBCUTANEOUS at 17:25

## 2020-09-24 RX ADMIN — INSULIN LISPRO 3 UNITS: 100 INJECTION, SOLUTION INTRAVENOUS; SUBCUTANEOUS at 08:50

## 2020-09-24 RX ADMIN — OXYCODONE 10 MG: 5 TABLET ORAL at 18:02

## 2020-09-24 RX ADMIN — INSULIN LISPRO 2 UNITS: 100 INJECTION, SOLUTION INTRAVENOUS; SUBCUTANEOUS at 12:38

## 2020-09-24 NOTE — PROGRESS NOTES
Problem: Self Care Deficits Care Plan (Adult)  Goal: *Acute Goals and Plan of Care (Insert Text)  Description: FUNCTIONAL STATUS PRIOR TO ADMISSION: Patient was independent and active without use of DME.     HOME SUPPORT: The patient lived with wife but did not require assist.    Occupational Therapy Goals  Initiated 9/23/2020  1. Patient will perform ADLs standing 5 mins without fatigue or LOB with supervision/set-up within 5 day(s). 2.  Patient will perform lower body ADLs with supervision/set-up within 5 day(s). 3.  Patient will perform gathering ADL items high and low 2/2 with supervision/set-up within 5 day(s). 4.  Patient will perform toilet transfers with supervision/set-up within 5 day(s). 5.  Patient will perform all aspects of toileting with supervision/set-up within 5 day(s). 6.  Patient will participate in cardiac/sternal upper extremity therapeutic exercise/activities to increase independence with ADLs with supervision/set-up for 5 minutes within 5 day(s). Outcome: Progressing Towards Goal   OCCUPATIONAL THERAPY TREATMENT  Patient: Rut Sears (72 y.o. male)  Date: 9/24/2020  Diagnosis: Aortic stenosis [I35.0]   <principal problem not specified>  Procedure(s) (LRB):  REDO STERNOTOMY, AORTIC VALVE REPLACEMENT WITH 25MM BIOPROSTHETIC HEART VALVE; RIGHT FEMORAL CUTDOWN FOR CANNULATION; CRISTINA AND EPIAORTIC BY DR. Adiel Mcdonough  (N/A) 2 Days Post-Op  Precautions: Fall  Chart, occupational therapy assessment, plan of care, and goals were reviewed. ASSESSMENT  Patient continues with skilled OT services and is progressing towards goals. Patient presents with sternotomy, pain, and decreased cardiopulmonary endurance that limits his ability to engage in self-care and IADLs.      Patient is verbalizing and is demonstrating understanding of mindful-based movements (\"move in the tube\") principles of keeping UEs proximal to ribcage to prevent lateral pull on the sternum during load-bearing activities with visual and verbal cues required for compliance. Current Level of Function Impacting Discharge (ADLs): set up and supervision for grooming; Mod assist for UB and LB dressing     Other factors to consider for discharge: supportive wife and prior knowledge of sternotomy precautions          PLAN :  Patient continues to benefit from skilled intervention to address the above impairments. Continue treatment per established plan of care. to address goals. Recommend with staff: Perri Holleyetti <> chair 3x/day; grooming or washing up at sink     Recommend next OT session: Lower body dressing or toileting     Recommendation for discharge: (in order for the patient to meet his/her long term goals)  No skilled occupational therapy/ follow up rehabilitation needs identified at this time, pending progression with ADLs     This discharge recommendation:  Has not yet been discussed the attending provider and/or case management           SUBJECTIVE:   Patient stated I feel fine.     OBJECTIVE DATA SUMMARY:   Cognitive/Behavioral Status:  Neurologic State: Alert  Orientation Level: Oriented X4  Cognition: Appropriate decision making; Appropriate for age attention/concentration        Safety/Judgement: Good awareness of safety precautions    Functional Mobility and Transfers for ADLs:    Transfers:  Sit to Stand: Contact guard assistance   Patient demonstrated functional mobility to and from bathroom with supervision. Balance:  Sitting: Intact; Without support  Sitting - Static: Fair (occasional)  Sitting - Dynamic: Fair (occasional)  Standing: Impaired; Without support  Standing - Static: Good  Standing - Dynamic : Fair    ADL Intervention:       Grooming  Grooming Assistance: Supervision;Set-up  Position Performed: Standing(at sink)  Washing Face: Set-up; Supervision  Brushing Teeth: Set-up; Supervision  Shaving: Set-up; Supervision     Patient demonstrated standing tolerance of 7 min at sink for shaving and brushing teeth.  SOB limiting increased standing tolerance. Cognitive Retraining  Safety/Judgement: Good awareness of safety precautions    Patient instructed and educated on mindful movement principles based on Move in The Tube concept to include maintaining bilateral elbows close to rib cage when performing any load-bearing activity such as getting in/out of bed, pushing up from a chair, opening a door, or lifting a box. Patient instructed in detail about activities to heed with caution, allowing pain to be the guide. These activities include but are not limited to: sexual activity, cooking, reaching for kitchen items, and all activities involving pulling motions. Patient instructed no asymmetrical reaching over head to ensure B UEs when shoulders >90* i.e. reaching in cabinets and dressing. Instruction on the benefits of utilizing B UEs during functional tasks i.e. opening the fridge. Instruction if continued pain at home with shoulder IR for BM hygiene can use wet wipes and toilet tongs PRN. Avoid valsalva maneuvers. May have to adjust home setup to increase ease with items closer to waist height to prevent deep bending and the automatic  of asymmetrical UE WB/pushing for stabilization during bending. Instruction and indicated understanding on the benefits of loose clothing throughout to accommodate for post surgical swelling, decreased ROM and increased pain. Increase activity tolerance for home, work, and sexual intercourse by pacing self with increasing the arm exercises, sitting duration, frequency OOB, walking, standing, and ADLs. Instructed and indicated understanding of s/s of too much activity, how to respond to s/s safely.       Pain:  None reported     Activity Tolerance:   Fair and requires rest breaks   Vitals:    09/24/20 0657 09/24/20 0728 09/24/20 1036 09/24/20 1042   BP:  (!) 177/58 (!) 201/65 (!) 176/59   BP 1 Location:  Right arm     BP Patient Position:  At rest Standing Sitting  Comment: 5 min after sitting   Pulse:  76 82 78   Resp:  18     Temp:  99 °F (37.2 °C)     SpO2:  96% 94%    Weight: 143.6 kg (316 lb 9.3 oz)      Height:           Please refer to the flowsheet for vital signs taken during this treatment. After treatment patient left in no apparent distress:   Sitting in chair, Call bell within reach, and Caregiver / family present    COMMUNICATION/COLLABORATION:   The patients plan of care was discussed with: Registered nurse.      LEAH Jackson   Time Calculation: 16 mins

## 2020-09-24 NOTE — PROGRESS NOTES
[facility] Progress Note    Admit Date: 2020  POD:  2 Day Post-Op    Procedure:  Procedure(s):  REDO STERNOTOMY, AORTIC VALVE REPLACEMENT WITH 25MM BIOPROSTHETIC HEART VALVE; RIGHT FEMORAL CUTDOWN FOR CANNULATION; CRISTINA AND EPIAORTIC BY DR. May Molina         Subjective:   Pt seen with Dr. Morgan Harada. Afebrile, Oxygen at 2L per NC. Up in the chair. In NSR,  On insulin gtt. Objective:   Vitals:  Blood pressure (!) 177/58, pulse 76, temperature 99 °F (37.2 °C), resp. rate 18, height 6' (1.829 m), weight 316 lb 9.3 oz (143.6 kg), SpO2 96 %. Temp (24hrs), Av.4 °F (36.9 °C), Min:97.9 °F (36.6 °C), Max:99 °F (37.2 °C)    EKG/Rhythm:  Sinus rhythm in the 60s. CT Output: 100 ml    Oxygen Therapy:  Oxygen Therapy  O2 Sat (%): 96 % (20 0728)  Pulse via Oximetry: 86 beats per minute (20 0400)  O2 Device: Nasal cannula (20 0336)  O2 Flow Rate (L/min): 2 l/min (20 0504)  FIO2 (%): 50 % (20 1730)    CXR:   CXR Results  (Last 48 hours)               20 0626  XR CHEST PORT Final result    Impression:  IMPRESSION: No significant change. Narrative:  INDICATION:  postop heart, redo sternotomy, aortic valve replacement. EXAM: CXR Portable. FINDINGS: Portable chest shows removal of [place] with stable chest remains since   yesterday. There is no apparent pneumothorax. Lungs show no acute findings. Heart size is large, stable. There is no overt pulmonary edema. 20 0459  XR CHEST PORT Final result    Impression:  IMPRESSION: Cardiomegaly. Patchy airspace opacity in the left lung base   increased in interval. This may represent atelectasis. Continued follow-up is   recommended       Narrative:  EXAM: XR CHEST PORT       INDICATION: postop heart       COMPARISON: 2020       FINDINGS: A portable AP radiograph of the chest was obtained at 409 hours. Median sternotomy changes are noted.  Left IJ [place]-Ana catheter is present with   the tip in the main pulmonary artery. Naman Mikki Patchy airspace opacities present in the   left lung base increase in interval.. Heart size is enlarged. The patient's been   extubated and the nasogastric tube removed. .  The bones and soft tissues are   grossly within normal limits. 09/22/20 1608  XR CHEST PORT Final result    Impression:  IMPRESSION: Life-support lines and tubes as above. Left lower lobe atelectasis. Narrative: Indication: Aortic valve replacement. Comparison to 9/21/2020. Portable exam obtained at 8695 demonstrates placement   of a left jugular [place]-Ana catheter with the tip projecting over the main   pulmonary artery outflow tract. ET tube has been placed and projects in   satisfactory position. NG tube has been placed but the tip is not visualized. Left lower lobe atelectasis. There is no pneumothorax. Admission Weight: Last Weight   Weight: 302 lb 7.5 oz (137.2 kg) Weight: 316 lb 9.3 oz (143.6 kg)     Intake / Output / Drain:  Current Shift: No intake/output data recorded. Last 24 hrs.:     Intake/Output Summary (Last 24 hours) at 9/24/2020 0939  Last data filed at 9/24/2020 0027  Gross per 24 hour   Intake 514.61 ml   Output 680 ml   Net -165.39 ml       EXAM:  General:  No acute distress. Up in the chair. Lungs:   Decreased aeration in the bases   Incision:  Dressing clean, dry and attact   Heart:  Regular rate and rhythm, S1, S2 normal, no murmur, click, rub or gallop. Abdomen:   Soft, non-tender. Bowel sounds hypoactive. No masses,  No organomegaly. Extremities:  No edema. PPP. Neurologic:  Gross motor and sensory apparatus intact.      Labs:   Recent Labs     09/24/20  0753  09/24/20  0325  09/22/20  1457   WBC  --   --  12.6*   < > 21.8*   HGB  --   --  12.9   < > 14.4   HCT  --   --  39.9   < > 43.2   PLT  --   --  82*   < > 106*   NA  --   --  138   < > 141   K  --   --  4.2   < > 4.5   BUN  --   --  31*   < > 27*   CREA  --   --  2.08*   < > 1.88*   GLU  --   --  92   < > 149*   GLUCPOC 119*   < >  --    < >  --    INR  --   --   --   --  1.2*    < > = values in this interval not displayed. Assessment:     Active Problems: Aortic valve stenosis, severe (2020)      Aortic stenosis (2020)      S/P AVR (aortic valve replacement) and aortoplasty (2020)      Overview: REDO STERNOTOMY      AORTIC VALVE REPLACEMENT WITH 25MM BIOPROSTHETIC HEART VALVE      RIGHT FEMORAL artery and vein CUTDOWN FOR CANNULATION         Plan/Recommendations/Medical Decision Makin. Mod to severe bioprosthetic AV stenosis s/p redo AVR: Hold baby aspirin d/t low platelets. Will eventually need 325 mg aspirin for valve once platelets recover. No plans to resume eliquis unless aflutter reoccurs. 2. CAD S/p CABG : on asa, statin, no BB historically-start as needed. 3. DM2: On insulin 75/25, actos at home. Hgba1c of 8.6. DTS consult. Continue Insulin gtt per protocol, will start NPH 28 units BID at 6 pm to transition of gtt. BS ACHS, change SSI to insulin resistent. May need to resume lispro w/ meals as BS > 200.      4. Chronic Diastolic CHF: supportive care. 5. HTN: resume norvasc, cozaar PTA--hold off d/t CKD. Restart antihypertensives as needed. 6. HLD: on statin    7. ANDRY: uses cpap    8. CKD3: Creatinine stable today at 2.08. Avoid hypotension and nephrotoxic agents. Continue to monitor    9. BPH: not on meds currently    10. A flutter: Currently in Sinus rhythm. Patient previously stopped the Eliquis on his own prior to surgery. No plans to resume unless Aflutter reoccurs postop    11. Anemia: Postoperative secondary to acute blood loss. Currently above transfusion threshold. Monitor daily CBC. 12. Atelectasis:  IS and TCDB. Increase activity. Wean oxygen to keep sat >92%. D/c CTs today. 13. Thrombocytopenia: Platelets at 67T. Hold aspirin. Change Pepcid to Protonix.      14. Nutrition: Cardiac w/ carb consistent     15. DVT/GI prophylaxis: SCD, PPI    Dispo: PT/OT/Cardiac Rehab. [facility] likely over weekend.         Signed By: Puneet Najera NP

## 2020-09-24 NOTE — CARDIO/PULMONARY
Cardiac Rehab: Met with . Annette Beckman who reports he has tentative plans for discharge on Saturday. Patient has done cardiac rehab [facility] in the past. Discussed the Cardiac Rehab Program format, benefits, and encouraged enrollment to assist with risk modification and management. Initial Cardiac Rehab Program intake appointment scheduled for 10/21/2020 at 9 am and appointment information is on the AVS. Annette Beckman verbalized understanding with questions answered.   Huma Gilbert RN

## 2020-09-24 NOTE — PROGRESS NOTES
Problem: Mobility Impaired (Adult and Pediatric)  Goal: *Acute Goals and Plan of Care (Insert Text)  Description: FUNCTIONAL STATUS PRIOR TO ADMISSION: Patient was independent and active without use of DME.     HOME SUPPORT PRIOR TO ADMISSION: The patient lived with wife but did not require assist.    Physical Therapy Goals  Initiated 9/23/2020  1. Patient will move from supine to sit and sit to supine , scoot up and down, and roll side to side in bed with modified independence within 5 days. 2.  Patient will perform sit to/from stand with modified independence within 5 days. 3.  Patient will ambulate 300 feet with least restrictive assistive device and independence within 5 days. 4.  Patient will ascend/descend 12 stairs with handrail(s) with modified independence within 5 days. 5.  Patient will perform cardiac exercises per protocol with independence within 5 days. 6.  Patient will verbally recall and functionally demonstrate mindful-based movements (\"move in the tube\") principles without cues within 5 days. Outcome: Progressing Towards Goal     PHYSICAL THERAPY TREATMENT  Patient: Nikita Gallegos (10 y.o. male)  Date: 9/24/2020  Diagnosis: Aortic stenosis [I35.0]   <principal problem not specified>  Procedure(s) (LRB):  REDO STERNOTOMY, AORTIC VALVE REPLACEMENT WITH 25MM BIOPROSTHETIC HEART VALVE; RIGHT FEMORAL CUTDOWN FOR CANNULATION; CRISTINA AND EPIAORTIC BY DR. Aleshia Cochran  (N/A) 2 Days Post-Op  Precautions: Fall  Chart, physical therapy assessment, plan of care and goals were reviewed. ASSESSMENT  Patient continues with skilled PT services and is progressing towards goals. Able to walk 150 ft with CGA, one standing rest break. Gait is slow and mildly unsteady though with no loss of balance. Required up to min assist for sit to stand from low recliner. Improved to CGA with training today. Patient is doing well with mindful based movements during functional mobility.        Patient is verbalizing and is demonstrating understanding of mindful-based movements (\"move in the tube\") principles of keeping UEs proximal to ribcage to prevent lateral pull on the sternum during load-bearing activities with verbal cues required for compliance with sit<->stand, once cued, patient followed through. Plan for next session: Stair training (has 14 to get to his bedroom) & bed mobility training (supine<->sit)    Current Level of Function Impacting Discharge (mobility/balance): CGA/ Min assist    Other factors to consider for discharge: New sternotomy; PLOF indep; lives with wife         PLAN :  Patient continues to benefit from skilled intervention to address the above impairments. Continue treatment per established plan of care. to address goals. Recommendation for discharge: (in order for the patient to meet his/her long term goals)  Physical therapy at least 2 days/week in the home     This discharge recommendation:  Has been made in collaboration with the attending provider and/or case management    IF patient discharges home will need the following DME: none       SUBJECTIVE:   Patient stated feels better today.  (referring to HR after walking)    OBJECTIVE DATA SUMMARY:   Patient mobilized on continuous portable monitor/telemetry. Critical Behavior:  Neurologic State: Alert  Orientation Level: Oriented X4  Cognition: Appropriate decision making, Appropriate for age attention/concentration  Safety/Judgement: Good awareness of safety precautions    Functional Mobility Training:  Bed Mobility:  N/t* sitting up in the chair on arrival and for lunch post therapy session                     Transfers:  Sit to Stand: Minimum assistance on first attempt from chair. Instructed to scoot to edge of chair and recommended hands on thighs w/ rocking for momentum. Able to complete with Contact guard assistance with training provided. Stand to Sit: Contact guard assistance to guide descent. Balance:  Sitting: Without support; Intact  Sitting - Static: Fair (occasional)  Sitting - Dynamic: Fair (occasional)  Standing: Impaired; Without support  Standing - Static: Good  Standing - Dynamic : Fair    Ambulation/Gait Training:  Distance (ft): 150 Feet (ft)(one standing rest break)  Assistive Device: Gait belt  Ambulation - Level of Assistance: Contact guard assistance;(Minimal assistance for IV pole) ; Assist x1;Additional time        Gait Abnormalities: Decreased step clearance;Trunk sway increased(mild unsteadiness, no LOB)        Base of Support: Widened     Speed/Sabi: Shuffled;Slow;Pace decreased (<100 feet/min)  Step Length: Right shortened;Left shortened  Instructed in benefit of standing rest breaks if fatigued. Cues to increase strides. Cardiac diagnosis intervention:  Patient instructed and educated on mindful movement principles based on Move in The Tube concept to include maintaining bilateral elbows close to rib cage when performing any load-bearing activity such as getting in/out of bed, pushing up from a chair, opening a door, or lifting a box. Pain Rating:  Voiced no pain pre or post therapy    Activity Tolerance:   Fair, requires rest breaks, and HTN today  Please refer to the flowsheet for vital signs taken during this treatment. After treatment patient left in no apparent distress:   Sitting in chair, Call bell within reach, and RN in the room    COMMUNICATION/COLLABORATION:   The patients plan of care was discussed with: Registered nurse.      Daryl Goel, PT   Time Calculation: 25 mins

## 2020-09-24 NOTE — PROCEDURES
Chest tubes x 2 discontinued without issues. 4x4, xeroform applied with good seal. Patient tolerated well. PA/Lat in the morning.

## 2020-09-24 NOTE — PROGRESS NOTES
Transitions of Care: 22% risk of re-admission      -Anticipate discharge home with home health services, PT/RN   -Cardiac Surgery follow-up   -Family to transport      [facility] has accepted the patient for home health services, aware of anticipated weekend discharge. The CM spoke with the Cardiac Surgery NP, anticipate discharge over-the-weekend with home health services, PT recommending home health. The CM met with the patient at bedside to discuss home health, would like to use agency he had in 2014- CM reviewed chart, [facility] was used- patient provided verbal agreement and [facility] Choice for [facility]. Referral sent via FIX[employer]. CM will follow for transitions of care needs. ADRIANNE David       12:34 p.m.- CM received call from [place] [facility] with [facility], they have accepted the patient for services, information on AVS- CM will keep Liaison updated on discharge status. Transition of Care Plan:     The Plan for Transition of Care is related to the following treatment goals: Home with home health    The Patient and/or patient representative  was provided with a choice of provider and agrees  with the discharge plan. Yes [x] No []    A Freedom of choice list was provided with basic dialogue that supports the patient's individualized plan of care/goals and shares the quality data associated with the providers.        Yes [x] No []

## 2020-09-24 NOTE — DIABETES MGMT
[facility]  CLINICAL NURSE SPECIALIST CONSULT  PROGRAM FOR DIABETES HEALTH    FOLLOW UP NOTE    Presentation   Carlita Haley is a 78 y.o. male who presented to the advanced cardiac valve center for evaluation of moderate to severe aortic valve stenosis with progressive c/o fatigue, dyspnea, weakness with 20-30 ln weight gain. HX: CAD s/o CABG x2 and AVR (2014), CKD, CHF, HTN, GERD, A flutter, DM2, partial colectomy, ANDRY, BPH, HLD    DX: Severe aortic stenosis    TX: aortic valve repair    Current clinical course has been uncomplicated. Diabetes: Patient has known Type two diabetes, treated with Humalog Mix 75/25 PTA. A1C 8.6%    Diabetes medication history  Drug class Currently in use Discontinued Never used   Biguanide      DDP-4 inhibitor       Sulfonylurea      Thiazolidinedione Actos 15 mg daily     GLP-1 RA      SGLT-2 inhibitors      Basal insulin      Fixed Dose  Combinations Humalog 75/25 50 units at breakfast and dinner     Bolus insulin        Subjective   On CVSU  On NC  Continues on insulin infusion   Required 73 units insulin yesterday- started PO intake today-25% consumed at breakfast; covering carbs. Objective   Physical exam  General Alert, oriented and in no acute distress/ill-appearing. Conversant and cooperative. Vital Signs   Visit Vitals  BP (!) 173/61 (BP 1 Location: Left arm, BP Patient Position: Sitting;Post activity)   Pulse 77   Temp 98.7 °F (37.1 °C)   Resp 20   Ht 6' (1.829 m)   Wt 143.6 kg (316 lb 9.3 oz)   SpO2 98%   BMI 42.94 kg/m²     Skin  Warm and dry. No acanthosis noted along neckline. No lipohypertrophy or lipoatrophy noted at injection sites   Heart   Regular rate and rhythm.  No murmurs, rubs or gallops  Lungs  Clear to auscultation without rales or rhonchi  Extremities No foot wounds      Laboratory  Lab Results   Component Value Date/Time    Hemoglobin A1c 8.6 (H) 09/21/2020 09:38 AM    Hemoglobin A1c (POC) 6.0 (A) 01/21/2020 09:07 AM     Lab Results Component Value Date/Time    LDL, calculated 66 07/30/2020 10:46 AM     Lab Results   Component Value Date/Time    Creatinine 2.08 (H) 09/24/2020 03:25 AM     Lab Results   Component Value Date/Time    Sodium 138 09/24/2020 03:25 AM    Potassium 4.2 09/24/2020 03:25 AM    Chloride 107 09/24/2020 03:25 AM    CO2 22 09/24/2020 03:25 AM    Anion gap 9 09/24/2020 03:25 AM    Glucose 92 09/24/2020 03:25 AM    BUN 31 (H) 09/24/2020 03:25 AM    Creatinine 2.08 (H) 09/24/2020 03:25 AM    BUN/Creatinine ratio 15 09/24/2020 03:25 AM    GFR est AA 39 (L) 09/24/2020 03:25 AM    GFR est non-AA 32 (L) 09/24/2020 03:25 AM    Calcium 8.6 09/24/2020 03:25 AM    Bilirubin, total 0.9 09/24/2020 03:25 AM    Alk. phosphatase 44 (L) 09/24/2020 03:25 AM    Protein, total 6.1 (L) 09/24/2020 03:25 AM    Albumin 3.3 (L) 09/24/2020 03:25 AM    Globulin 2.8 09/24/2020 03:25 AM    A-G Ratio 1.2 09/24/2020 03:25 AM    ALT (SGPT) 15 09/24/2020 03:25 AM     Lab Results   Component Value Date/Time    ALT (SGPT) 15 09/24/2020 03:25 AM       Factors affecting BG pattern  Factor Dose Comments   Nutrition:  Carb-controlled meals   60 grams/meal      Blood glucose pattern        Assessment and Plan   Nursing Diagnosis Risk for unstable blood glucose pattern   Nursing Intervention Domain 4445 Decision-making Support   Nursing Interventions Examined current inpatient diabetes control   Explored factors facilitating and impeding inpatient management  Identified self-management practices impeding diabetes control  Explored corrective strategies with patient and responsible inpatient provider   Informed patient of rational for insulin strategy while hospitalized     Evaluation   Jaspreet Mcknight is a 78year old gentleman with uncontrolled diabetes admitted pre-operatively for insulin adjustment prior to scheduled TAVR for severe aortic stenosis.  A1C on admission was 8.6% in actos and high dose humalog mix and will require insulin adjustments in the post-operative period to target inpatient glucose goal of 100-180. At this time, glucose is controlled on an insulin infusion. Recommendations   Recommend:  1. Insulin infusion per post-operative period    2. Basal insulin 2 hrs prior to d/c infusion when transitioning off. Start with high dose 0.4 units/k units NPH BID.     -Increase to home basal rate the following day (35 units BID) if fasting  glucose over 200     -Decrease dose to 0.3 units/kg if fasting glucose below 100    3. Correctional insulin at resistant sensitivity, start at a glucose of 200    4. If eating well, add mealtime Humalog: 10 units scheduled with meals. Hold if patient consumes less than 50% of carbohydrates on meal tray      Billing Code(s)   I personally reviewed chart, notes, data and current medications in the medical record, and examined the patient at bedside before making care recommendations.      [x] [id number] IP subsequent hospital care - 15 minutes      RAMIRO Devlin  Program for Diabetes Health  Access via [address] [place] [address]  [phone]

## 2020-09-24 NOTE — PROGRESS NOTES
Cardiac Surgery Care Coordinator- Met with Lilian Valentin, reviewed plan of care and discussed potential discharge date. Reinforced sternal precautions and encouraged continued use of the incentive spirometer. Reviewed goals for the day and emphasized the importance of increased activity to meet discharge goals. Will continue to follow for educational and emotional needs.  Tony Quinonez RN

## 2020-09-25 ENCOUNTER — APPOINTMENT (OUTPATIENT)
Dept: GENERAL RADIOLOGY | Age: 70
DRG: 220 | End: 2020-09-25
Attending: NURSE PRACTITIONER
Payer: MEDICARE

## 2020-09-25 LAB
ALBUMIN SERPL-MCNC: 3.3 G/DL (ref 3.5–5)
ALBUMIN/GLOB SERPL: 1 {RATIO} (ref 1.1–2.2)
ALP SERPL-CCNC: 55 U/L (ref 45–117)
ALT SERPL-CCNC: 14 U/L (ref 12–78)
ANION GAP SERPL CALC-SCNC: 13 MMOL/L (ref 5–15)
APTT PPP: 33.3 SEC (ref 22.1–32)
AST SERPL-CCNC: 33 U/L (ref 15–37)
BILIRUB SERPL-MCNC: 1.4 MG/DL (ref 0.2–1)
BUN SERPL-MCNC: 32 MG/DL (ref 6–20)
BUN/CREAT SERPL: 17 (ref 12–20)
CALCIUM SERPL-MCNC: 8.8 MG/DL (ref 8.5–10.1)
CHLORIDE SERPL-SCNC: 103 MMOL/L (ref 97–108)
CO2 SERPL-SCNC: 18 MMOL/L (ref 21–32)
CREAT SERPL-MCNC: 1.9 MG/DL (ref 0.7–1.3)
ERYTHROCYTE [DISTWIDTH] IN BLOOD BY AUTOMATED COUNT: 14.3 % (ref 11.5–14.5)
GLOBULIN SER CALC-MCNC: 3.4 G/DL (ref 2–4)
GLUCOSE BLD STRIP.AUTO-MCNC: 176 MG/DL (ref 65–100)
GLUCOSE BLD STRIP.AUTO-MCNC: 180 MG/DL (ref 65–100)
GLUCOSE BLD STRIP.AUTO-MCNC: 208 MG/DL (ref 65–100)
GLUCOSE BLD STRIP.AUTO-MCNC: 223 MG/DL (ref 65–100)
GLUCOSE SERPL-MCNC: 183 MG/DL (ref 65–100)
HCT VFR BLD AUTO: 43.5 % (ref 36.6–50.3)
HGB BLD-MCNC: 14.3 G/DL (ref 12.1–17)
INR PPP: 1.1 (ref 0.9–1.1)
MAGNESIUM SERPL-MCNC: 2.6 MG/DL (ref 1.6–2.4)
MCH RBC QN AUTO: 30.4 PG (ref 26–34)
MCHC RBC AUTO-ENTMCNC: 32.9 G/DL (ref 30–36.5)
MCV RBC AUTO: 92.4 FL (ref 80–99)
NRBC # BLD: 0 K/UL (ref 0–0.01)
NRBC BLD-RTO: 0 PER 100 WBC
PLATELET # BLD AUTO: 103 K/UL (ref 150–400)
PMV BLD AUTO: 10 FL (ref 8.9–12.9)
POTASSIUM SERPL-SCNC: 4.5 MMOL/L (ref 3.5–5.1)
PROT SERPL-MCNC: 6.7 G/DL (ref 6.4–8.2)
PROTHROMBIN TIME: 11.1 SEC (ref 9–11.1)
RBC # BLD AUTO: 4.71 M/UL (ref 4.1–5.7)
SERVICE CMNT-IMP: ABNORMAL
SODIUM SERPL-SCNC: 134 MMOL/L (ref 136–145)
THERAPEUTIC RANGE,PTTT: ABNORMAL SECS (ref 58–77)
WBC # BLD AUTO: 15.5 K/UL (ref 4.1–11.1)

## 2020-09-25 PROCEDURE — 99232 SBSQ HOSP IP/OBS MODERATE 35: CPT | Performed by: CLINICAL NURSE SPECIALIST

## 2020-09-25 PROCEDURE — 83735 ASSAY OF MAGNESIUM: CPT

## 2020-09-25 PROCEDURE — 97116 GAIT TRAINING THERAPY: CPT

## 2020-09-25 PROCEDURE — 82962 GLUCOSE BLOOD TEST: CPT

## 2020-09-25 PROCEDURE — 77010033678 HC OXYGEN DAILY

## 2020-09-25 PROCEDURE — 74011250636 HC RX REV CODE- 250/636: Performed by: NURSE PRACTITIONER

## 2020-09-25 PROCEDURE — 80053 COMPREHEN METABOLIC PANEL: CPT

## 2020-09-25 PROCEDURE — 97530 THERAPEUTIC ACTIVITIES: CPT

## 2020-09-25 PROCEDURE — 93005 ELECTROCARDIOGRAM TRACING: CPT

## 2020-09-25 PROCEDURE — 85027 COMPLETE CBC AUTOMATED: CPT

## 2020-09-25 PROCEDURE — 74011636637 HC RX REV CODE- 636/637: Performed by: NURSE PRACTITIONER

## 2020-09-25 PROCEDURE — 36415 COLL VENOUS BLD VENIPUNCTURE: CPT

## 2020-09-25 PROCEDURE — 85610 PROTHROMBIN TIME: CPT

## 2020-09-25 PROCEDURE — 74011250637 HC RX REV CODE- 250/637: Performed by: NURSE PRACTITIONER

## 2020-09-25 PROCEDURE — 85730 THROMBOPLASTIN TIME PARTIAL: CPT

## 2020-09-25 PROCEDURE — 65660000001 HC RM ICU INTERMED STEPDOWN

## 2020-09-25 PROCEDURE — 71046 X-RAY EXAM CHEST 2 VIEWS: CPT

## 2020-09-25 RX ORDER — METOPROLOL TARTRATE 50 MG/1
50 TABLET ORAL EVERY 12 HOURS
Status: DISCONTINUED | OUTPATIENT
Start: 2020-09-25 | End: 2020-09-27

## 2020-09-25 RX ORDER — METOPROLOL TARTRATE 25 MG/1
25 TABLET, FILM COATED ORAL EVERY 12 HOURS
Status: DISCONTINUED | OUTPATIENT
Start: 2020-09-25 | End: 2020-09-25

## 2020-09-25 RX ADMIN — AMIODARONE HYDROCHLORIDE 400 MG: 200 TABLET ORAL at 20:30

## 2020-09-25 RX ADMIN — OXYCODONE 10 MG: 5 TABLET ORAL at 07:36

## 2020-09-25 RX ADMIN — Medication 10 ML: at 22:00

## 2020-09-25 RX ADMIN — MUPIROCIN: 20 OINTMENT TOPICAL at 08:55

## 2020-09-25 RX ADMIN — POLYETHYLENE GLYCOL 3350 17 G: 17 POWDER, FOR SOLUTION ORAL at 08:55

## 2020-09-25 RX ADMIN — INSULIN LISPRO 4 UNITS: 100 INJECTION, SOLUTION INTRAVENOUS; SUBCUTANEOUS at 17:04

## 2020-09-25 RX ADMIN — MAGNESIUM OXIDE 400 MG: 400 TABLET ORAL at 08:54

## 2020-09-25 RX ADMIN — HUMAN INSULIN 28 UNITS: 100 INJECTION, SUSPENSION SUBCUTANEOUS at 08:53

## 2020-09-25 RX ADMIN — DOCUSATE SODIUM 50MG AND SENNOSIDES 8.6MG 1 TABLET: 8.6; 5 TABLET, FILM COATED ORAL at 17:05

## 2020-09-25 RX ADMIN — CHLORHEXIDINE GLUCONATE 10 ML: 1.2 RINSE ORAL at 08:55

## 2020-09-25 RX ADMIN — INSULIN LISPRO 4 UNITS: 100 INJECTION, SOLUTION INTRAVENOUS; SUBCUTANEOUS at 12:19

## 2020-09-25 RX ADMIN — CHLORHEXIDINE GLUCONATE 10 ML: 1.2 RINSE ORAL at 21:00

## 2020-09-25 RX ADMIN — METOPROLOL TARTRATE 50 MG: 50 TABLET, FILM COATED ORAL at 08:54

## 2020-09-25 RX ADMIN — PANTOPRAZOLE SODIUM 40 MG: 40 TABLET, DELAYED RELEASE ORAL at 07:29

## 2020-09-25 RX ADMIN — HYDRALAZINE HYDROCHLORIDE 20 MG: 20 INJECTION INTRAMUSCULAR; INTRAVENOUS at 05:23

## 2020-09-25 RX ADMIN — DOCUSATE SODIUM 50MG AND SENNOSIDES 8.6MG 1 TABLET: 8.6; 5 TABLET, FILM COATED ORAL at 08:54

## 2020-09-25 RX ADMIN — ASPIRIN 81 MG CHEWABLE TABLET 81 MG: 81 TABLET CHEWABLE at 08:54

## 2020-09-25 RX ADMIN — INSULIN LISPRO 3 UNITS: 100 INJECTION, SOLUTION INTRAVENOUS; SUBCUTANEOUS at 08:53

## 2020-09-25 RX ADMIN — HUMAN INSULIN 28 UNITS: 100 INJECTION, SUSPENSION SUBCUTANEOUS at 17:04

## 2020-09-25 RX ADMIN — ATORVASTATIN CALCIUM 40 MG: 40 TABLET, FILM COATED ORAL at 08:54

## 2020-09-25 RX ADMIN — AMLODIPINE BESYLATE 10 MG: 5 TABLET ORAL at 08:54

## 2020-09-25 RX ADMIN — AMIODARONE HYDROCHLORIDE 400 MG: 200 TABLET ORAL at 08:54

## 2020-09-25 RX ADMIN — MUPIROCIN: 20 OINTMENT TOPICAL at 17:05

## 2020-09-25 RX ADMIN — MAGNESIUM OXIDE 400 MG: 400 TABLET ORAL at 17:05

## 2020-09-25 RX ADMIN — OXYCODONE 5 MG: 5 TABLET ORAL at 12:19

## 2020-09-25 RX ADMIN — OXYCODONE 5 MG: 5 TABLET ORAL at 17:05

## 2020-09-25 RX ADMIN — METOPROLOL TARTRATE 50 MG: 50 TABLET, FILM COATED ORAL at 20:30

## 2020-09-25 RX ADMIN — OXYCODONE 10 MG: 5 TABLET ORAL at 03:48

## 2020-09-25 RX ADMIN — Medication 10 ML: at 05:23

## 2020-09-25 RX ADMIN — Medication 10 ML: at 17:05

## 2020-09-25 NOTE — PROGRESS NOTES
Cardiac Surgery Care Coordinator- Met with Dinorah Tanner and his family,  reviewed plan of care and discussed potential upcoming discharge date. Reinforced sternal precautions and encouraged continued use of the incentive spirometer. Began discharge teaching and encouraged him to verbalize. Reviewed goals for the day and discussed the  importance of increased activity and continued activity after discharge. Reviewed importance of wearing the red reminder bracelet and when to call MD. Will continue to follow for educational and emotional needs.  Marcos Lanier RN

## 2020-09-25 NOTE — PROGRESS NOTES
Problem: Mobility Impaired (Adult and Pediatric)  Goal: *Acute Goals and Plan of Care (Insert Text)  Description: FUNCTIONAL STATUS PRIOR TO ADMISSION: Patient was independent and active without use of DME.     HOME SUPPORT PRIOR TO ADMISSION: The patient lived with wife but did not require assist.    Physical Therapy Goals  Initiated 9/23/2020  1. Patient will move from supine to sit and sit to supine , scoot up and down, and roll side to side in bed with modified independence within 5 days. 2.  Patient will perform sit to/from stand with modified independence within 5 days. 3.  Patient will ambulate 300 feet with least restrictive assistive device and independence within 5 days. 4.  Patient will ascend/descend 12 stairs with handrail(s) with modified independence within 5 days. 5.  Patient will perform cardiac exercises per protocol with independence within 5 days. 6.  Patient will verbally recall and functionally demonstrate mindful-based movements (\"move in the tube\") principles without cues within 5 days. Outcome: Progressing Towards Goal   PHYSICAL THERAPY TREATMENT  Patient: Martinez Cazares (64 y.o. male)  Date: 9/25/2020  Diagnosis: Aortic stenosis [I35.0]   <principal problem not specified>  Procedure(s) (LRB):  REDO STERNOTOMY, AORTIC VALVE REPLACEMENT WITH 25MM BIOPROSTHETIC HEART VALVE; RIGHT FEMORAL CUTDOWN FOR CANNULATION; CRISTINA AND EPIAORTIC BY DR. Yodit Gillette  (N/A) 3 Days Post-Op  Precautions: Fall  Chart, physical therapy assessment, plan of care and goals were reviewed. ASSESSMENT  Patient continues with skilled PT services and is progressing towards goals. Functional mobility is limited by sternal precautions, decreased endurance & tolerance to activity. Patient is able to walk 170 ft with SBA with only one brief standing rest break required today. Noted increased geena and improved strides.   He completed 7 steps with CGA, required standing rest afterwards before walking back to the room. Reviewed sternal precautions (move in the tube) with functional mobility and completed education today. Patient is safe to return home with family assisting and HHPT. Therapy will continue to follow to achieve goals. For the weekend, recommend with nursing, patient to complete as able in order to maintain strength, endurance and independence: OOB to chair and walking with staff assist 3x/day. Thank you for your assistance. Patient is verbalizing and is demonstrating understanding of mindful-based movements (\"move in the tube\") principles of keeping UEs proximal to ribcage to prevent lateral pull on the sternum during load-bearing activities without cues required for compliance. Current Level of Function Impacting Discharge (mobility/balance): CGA for sit<->stand, SBA walking with no device, and CGA 7 steps with handrail. Other factors to consider for discharge: PLOF indep; redo sternotomy         PLAN :  Patient continues to benefit from skilled intervention to address the above impairments. Continue treatment per established plan of care. to address goals. Recommendation for discharge: (in order for the patient to meet his/her long term goals)  Physical therapy at least 2 days/week in the home AND ensure assist and/or supervision for safety with mobility    This discharge recommendation:  Has been made in collaboration with the attending provider and/or case management    IF patient discharges home will need the following DME: none       SUBJECTIVE:   Patient states: \"Ok\".   Flat affect, does not engage much though will answer questions asked    OBJECTIVE DATA SUMMARY:   Critical Behavior:  Neurologic State: Alert  Orientation Level: Oriented X4  Cognition: Appropriate safety awareness, Appropriate decision making, Appropriate for age attention/concentration, Follows commands  Safety/Judgement: Good awareness of safety precautions  Functional Mobility Training:  Bed Mobility: Received patient sitting up in the chair; patient requested to remain sitting up                 Transfers:  Sit to Stand: Contact guard assistance  Stand to Sit: Contact guard assistance  Cues provided to scoot to edge of chair before attempting to stand. Has difficulty with this taking extra time and trials to complete. Relies on rocking for momentum, achieves on first attempt   Reminded patient he is able to use UE's on thighs for push off. Balance:  Standing: Impaired; Without support  Standing - Static: Good  Standing - Dynamic : Good;Fair  Ambulation/Gait Training:  Distance (ft): 150 Feet (ft)  Assistive Device: Gait belt  Ambulation - Level of Assistance: Stand-by assistance        Gait Abnormalities: Decreased step clearance;Trunk sway increased        Base of Support: Widened     Speed/Sabi: Slow  Step Length: Right shortened;Left shortened  Cues provided for relaxed arm swing. Stairs:  Number of Stairs Trained: 7  Stairs - Level of Assistance: Contact guard assistance   Rail Use: Right    Instructed in technique for mindful based movement when using handrail. Patient completed steps utilizing reciprocal gait. Cardiac diagnosis intervention:  Patient instructed and educated on mindful movement principles based on Move in The Tube concept to include maintaining bilateral elbows close to rib cage when performing any load-bearing activity such as getting in/out of bed, pushing up from a chair, opening a door, or lifting a box. Instructed in use of bilateral UEs when reaching in cabinets above head, chest splinting when coughing, riding in the back seat of the car & no driving R10 days, using the bear or pillow between seat belt and chest.     Therapeutic Exercises:   Patient instructed on the benefits and demonstrated cardiac exercises while seated with Supervision.  Instructed and indicated understanding on how to progress reps, sets against gravity, pacing through progressive muscle strengthening standing based on surgeon clearance for more weight in prep for basic and instrumental ADLs. Instruction on the use of household items in place of weights as needed. CARDIAC  EXERCISE   Sets   Reps   Active Active Assist   Passive Self ROM   Comments   Shoulder flexion 1 5 [x]                                            []                                            []                                            []                                               Shoulder abduction 1      5 []                                            []                                            []                                            []                                               Scapular elevation 1 5 [x]                                            []                                            []                                            []                                               Scapular retraction 1 5 [x]                                            []                                            []                                            []                                               Trunk rotation 1 5 [x]                                            []                                            []                                            []                                               Trunk sidebending 1 5 [x]                                            []                                            []                                            []                                                  []                                            []                                            []                                            []                                                   Pain Rating:  Voiced no pain    Activity Tolerance:   Fair and fatigued and sob after.  Recovered to baseline less than 2 min seated rest.  Please refer to the flowsheet for vital signs taken during this treatment. After treatment patient left in no apparent distress:   Sitting in chair and Call bell within reach    COMMUNICATION/COLLABORATION:   The patients plan of care was discussed with: Registered nurse.      Farzaneh Bragg, PT   Time Calculation: 28 mins

## 2020-09-25 NOTE — PROGRESS NOTES
Transitions of Care: 17% risk of re-admission      -Patient will discharge home with [facility] for skilled nursing and PT   -Family to transport   -Cardiac Surgery follow-up    The CM spoke with the Cardiac Surgery NP- patient is a possible/anticipated weekend discharge. The CM called Annabella, Liaison with [facility], and notified her of possible weekend discharge- [facility] PT order added. Ashkan Velásquez will place patient on weekend pending d/c list for home health. CM attempted to meet with patient at bedside, off-the-floor for testing, however, spouse at bedside- CM spoke with Mrs. Arnol Adams, notified her of [facility], she verbalized agreement- spouse endorses she spoke with home health this morning. Medicare pt has received and reviewed 2nd IM letter informing them of their right to appeal the discharge. Copy has been placed on pt bedside chart.

## 2020-09-25 NOTE — PROGRESS NOTES
0730: Bedside shift change report given to 129 Marybeth Strickland (oncoming nurse) by Gladys Dill RN (offgoing nurse). Report included the following information SBAR, Kardex, Intake/Output, MAR, Accordion and Recent Results. 0845: Weaned patient to RA. Saturating at 95%. 0930: EKG completed d/t increased HR. Patient in a fib per EKG. Informed Andres Overall, NP, and NP will assess. 1930: Bedside shift change report given to 2755 Sesar Leslie (oncoming nurse) by Sadaf Hemphill RN (offgoing nurse). Report included the following information SBAR, Kardex, Intake/Output, MAR, Accordion, Recent Results and Cardiac Rhythm a flutter. Problem: Pressure Injury - Risk of  Goal: *Prevention of pressure injury  Description: Document Erich Scale and appropriate interventions in the flowsheet. Outcome: Progressing Towards Goal  Note: Pressure Injury Interventions:  Sensory Interventions: Assess changes in LOC         Activity Interventions: Increase time out of bed, PT/OT evaluation    Mobility Interventions: Pressure redistribution bed/mattress (bed type)    Nutrition Interventions: Document food/fluid/supplement intake    Friction and Shear Interventions: Lift sheet, Lift team/patient mobility team, Minimize layers                Problem: Patient Education: Go to Patient Education Activity  Goal: Patient/Family Education  Outcome: Progressing Towards Goal     Problem: Falls - Risk of  Goal: *Absence of Falls  Description: Document Sahil Fall Risk and appropriate interventions in the flowsheet.   Outcome: Progressing Towards Goal  Note: Fall Risk Interventions:  Mobility Interventions: Communicate number of staff needed for ambulation/transfer, Patient to call before getting OOB         Medication Interventions: Patient to call before getting OOB, Teach patient to arise slowly    Elimination Interventions: Call light in reach, Stay With Me (per policy), Patient to call for help with toileting needs              Problem: Patient Education: Go to Patient Education Activity  Goal: Patient/Family Education  Outcome: Progressing Towards Goal     Problem: Pain  Goal: *Control of Pain  Outcome: Progressing Towards Goal     Problem: Patient Education: Go to Patient Education Activity  Goal: Patient/Family Education  Outcome: Progressing Towards Goal     Problem: Cardiac Valve Surgery: Post-Op Day 4  Goal: Activity/Safety  Outcome: Progressing Towards Goal  Goal: Diagnostic Test/Procedures  Outcome: Progressing Towards Goal  Goal: Nutrition/Diet  Outcome: Progressing Towards Goal  Goal: Discharge Planning  Outcome: Progressing Towards Goal  Goal: Medications  Outcome: Progressing Towards Goal  Goal: Respiratory  Outcome: Progressing Towards Goal  Goal: Treatments/Interventions/Procedures  Outcome: Progressing Towards Goal  Goal: Psychosocial  Outcome: Progressing Towards Goal  Goal: *Hemodynamically stable  Outcome: Progressing Towards Goal  Goal: *Lungs clear or at baseline  Outcome: Progressing Towards Goal  Goal: *Optimal pain control at patient's stated goal  Outcome: Progressing Towards Goal  Goal: *Incisions without signs and symptoms of wound complication  Outcome: Progressing Towards Goal  Goal: *Ambulating and performing exercises with assistance  Outcome: Progressing Towards Goal  Goal: *Demonstrates progressive activity  Outcome: Progressing Towards Goal  Goal: *Tolerating diet  Outcome: Progressing Towards Goal

## 2020-09-25 NOTE — PROGRESS NOTES
1930: Bedside shift change report given to Simi Steele and Darin Thorpe (oncoming nurse) by Angelito Olson (offgoing nurse). Report included the following information SBAR, Kardex, Intake/Output, MAR and Cardiac Rhythm NSR/Afib.     0730: Bedside shift change report given to Angelito Olson (oncoming nurse) by Selvin Kessler (offgoing nurse). Report included the following information SBAR, Kardex, Intake/Output, MAR and Cardiac Rhythm A flutter/NSR.

## 2020-09-25 NOTE — PROGRESS NOTES
768 [facility] visit. Mr. Ravi Cordova was asleep in his chair. Prayer for spiritual communion offered.     ESVIN Andrea, RN, ACSW, LCSW   Page:  [phone]

## 2020-09-25 NOTE — DIABETES MGMT
[facility]  CLINICAL NURSE SPECIALIST CONSULT  PROGRAM FOR DIABETES HEALTH    FOLLOW UP NOTE    Presentation   Corinne Hays is a 78 y.o. male who presented to the advanced cardiac valve center for evaluation of moderate to severe aortic valve stenosis with progressive c/o fatigue, dyspnea, weakness with 20-30 ln weight gain. HX: CAD s/o CABG x2 and AVR (2014), CKD, CHF, HTN, GERD, A flutter, DM2, partial colectomy, ANDRY, BPH, HLD    DX: Severe aortic stenosis    TX: aortic valve repair    Current clinical course has been uncomplicated. Diabetes: Patient has known Type two diabetes, treated with Humalog Mix 75/25 PTA. A1C 8.6%    Diabetes medication history  Drug class Currently in use Discontinued Never used   Biguanide      DDP-4 inhibitor       Sulfonylurea      Thiazolidinedione Actos 15 mg daily     GLP-1 RA      SGLT-2 inhibitors      Basal insulin      Fixed Dose  Combinations Humalog 75/25 50 units at breakfast and dinner     Bolus insulin        Subjective   Transferred to [facility]  Transitioned to basal, correctional insulin  Poor PO intake- bites of meals  Objective   Physical exam  General Alert, oriented and in no acute distress/ill-appearing. Conversant and cooperative. Vital Signs   Visit Vitals  BP (!) 155/59 (BP 1 Location: Left arm, BP Patient Position: At rest)   Pulse (!) 118   Temp 99.4 °F (37.4 °C)   Resp 18   Ht 6' (1.829 m)   Wt 141.6 kg (312 lb 2.7 oz)   SpO2 95%   BMI 42.34 kg/m²     Skin  Warm and dry. No acanthosis noted along neckline. No lipohypertrophy or lipoatrophy noted at injection sites   Heart   Regular rate and rhythm.  No murmurs, rubs or gallops  Lungs  Clear to auscultation without rales or rhonchi  Extremities No foot wounds      Laboratory  Lab Results   Component Value Date/Time    Hemoglobin A1c 8.6 (H) 09/21/2020 09:38 AM    Hemoglobin A1c (POC) 6.0 (A) 01/21/2020 09:07 AM     Lab Results   Component Value Date/Time    LDL, calculated 66 07/30/2020 10:46 AM Lab Results   Component Value Date/Time    Creatinine 1.90 (H) 09/25/2020 03:56 AM     Lab Results   Component Value Date/Time    Sodium 134 (L) 09/25/2020 03:56 AM    Potassium 4.5 09/25/2020 03:56 AM    Chloride 103 09/25/2020 03:56 AM    CO2 18 (L) 09/25/2020 03:56 AM    Anion gap 13 09/25/2020 03:56 AM    Glucose 183 (H) 09/25/2020 03:56 AM    BUN 32 (H) 09/25/2020 03:56 AM    Creatinine 1.90 (H) 09/25/2020 03:56 AM    BUN/Creatinine ratio 17 09/25/2020 03:56 AM    GFR est AA 43 (L) 09/25/2020 03:56 AM    GFR est non-AA 35 (L) 09/25/2020 03:56 AM    Calcium 8.8 09/25/2020 03:56 AM    Bilirubin, total 1.4 (H) 09/25/2020 03:56 AM    Alk. phosphatase 55 09/25/2020 03:56 AM    Protein, total 6.7 09/25/2020 03:56 AM    Albumin 3.3 (L) 09/25/2020 03:56 AM    Globulin 3.4 09/25/2020 03:56 AM    A-G Ratio 1.0 (L) 09/25/2020 03:56 AM    ALT (SGPT) 14 09/25/2020 03:56 AM     Lab Results   Component Value Date/Time    ALT (SGPT) 14 09/25/2020 03:56 AM       Factors affecting BG pattern  Factor Dose Comments   Nutrition:  Carb-controlled meals   60 grams/meal NPO at MN     Blood glucose pattern        Assessment and Plan   Nursing Diagnosis Risk for unstable blood glucose pattern   Nursing Intervention Domain 9607 Decision-making Support   Nursing Interventions Examined current inpatient diabetes control   Explored factors facilitating and impeding inpatient management  Identified self-management practices impeding diabetes control  Explored corrective strategies with patient and responsible inpatient provider   Informed patient of rational for insulin strategy while hospitalized     Evaluation   Mariah Child is a 78year old gentleman with uncontrolled diabetes admitted pre-operatively for insulin adjustment prior to scheduled TAVR for severe aortic stenosis.  A1C on admission was 8.6% in actos and high dose humalog mix and will require insulin adjustments in the post-operative period to target inpatient glucose goal of 100-180. At this time, glucose is controlled on basal and correctional insulin in the post-operative period. Recommendations   Recommend:  1. Insulin infusion per post-operative period    2. Continue basal insulin at 28 units NPH BID    -Increase to home basal rate the following day (35 units BID) if fasting glucose over 200    3. Continue correctional insulin at resistant sensitivity    4. If eating well, add mealtime Humalog: 10 units scheduled with meals. Hold if patient consumes less than 50% of carbohydrates on meal tray      On Discharge: OK to resume PTA anti-hyperglycemic agents at PTA doses      Billing Code(s)   I personally reviewed chart, notes, data and current medications in the medical record, and examined the patient at bedside before making care recommendations.      [x] [id number] IP subsequent hospital care - 25 minutes      RAMIRO Antoine  [facility] Diabetes [facility]  Access via [address] [place] [address]  [phone]

## 2020-09-25 NOTE — PROGRESS NOTES
CSS Progress Note    Admit Date: 2020  POD:  3 Day Post-Op    Procedure:  Procedure(s):  REDO STERNOTOMY, AORTIC VALVE REPLACEMENT WITH 25MM BIOPROSTHETIC HEART VALVE; RIGHT FEMORAL CUTDOWN FOR CANNULATION; CRISTINA AND EPIAORTIC BY DR. Blas Chavez         Subjective:   Pt seen with Dr. Toniann Cushing. Afebrile, Oxygen at 1L per NC. Up in the chair. In NSR,   HR/BP high overnight      Objective:   Vitals:  Blood pressure (!) 155/59, pulse (!) 118, temperature 99.4 °F (37.4 °C), resp. rate 18, height 6' (1.829 m), weight 312 lb 2.7 oz (141.6 kg), SpO2 95 %. Temp (24hrs), Av.8 °F (37.1 °C), Min:98.1 °F (36.7 °C), Max:99.4 °F (37.4 °C)    EKG/Rhythm:  ST 100s     Oxygen Therapy:  Oxygen Therapy  O2 Sat (%): 95 % (20 0845)  Pulse via Oximetry: 81 beats per minute (20 1835)  O2 Device: Nasal cannula (20 034)  O2 Flow Rate (L/min): 1 l/min (20 0344)  FIO2 (%): 50 % (20 1730)    CXR:   CXR Results  (Last 48 hours)               20 0626  XR CHEST PORT Final result    Impression:  IMPRESSION: No significant change. Narrative:  INDICATION:  postop heart, redo sternotomy, aortic valve replacement. EXAM: CXR Portable. FINDINGS: Portable chest shows removal of Painter with stable chest remains since   yesterday. There is no apparent pneumothorax. Lungs show no acute findings. Heart size is large, stable. There is no overt pulmonary edema. Admission Weight: Last Weight   Weight: 302 lb 7.5 oz (137.2 kg) Weight: 312 lb 2.7 oz (141.6 kg)     Intake / Output / Drain:  Current Shift:  0701 -  1900  In: -   Out: 300 [Urine:300]  Last 24 hrs.:     Intake/Output Summary (Last 24 hours) at 2020 0918  Last data filed at 2020 0735  Gross per 24 hour   Intake 180 ml   Output 475 ml   Net -295 ml       EXAM:  General:  No acute distress. Up in the chair.                     Lungs:   Decreased aeration in the bases   Incision:  Dressing clean, dry and attact   Heart:  Regular rate and rhythm, S1, S2 normal, no murmur, click, rub or gallop. Abdomen:   Soft, non-tender. Bowel sounds hypoactive. No masses,  No organomegaly. Extremities:  No edema. PPP. Neurologic:  Gross motor and sensory apparatus intact. Labs:   Recent Labs     20  0633 20  0356   WBC  --  15.5*   HGB  --  14.3   HCT  --  43.5   PLT  --  103*   NA  --  134*   K  --  4.5   BUN  --  32*   CREA  --  1.90*   GLU  --  183*   GLUCPOC 176*  --    INR  --  1.1        Assessment:     Active Problems: Aortic valve stenosis, severe (2020)      Aortic stenosis (2020)      S/P AVR (aortic valve replacement) and aortoplasty (2020)      Overview: REDO STERNOTOMY      AORTIC VALVE REPLACEMENT WITH 25MM BIOPROSTHETIC HEART VALVE      RIGHT FEMORAL artery and vein CUTDOWN FOR CANNULATION         Plan/Recommendations/Medical Decision Makin. Mod to severe bioprosthetic AV stenosis s/p redo AVR: Resume asa 81 mg PO daily. Will eventually need 325 mg aspirin for valve once platelets recover. No plans to resume eliquis unless aflutter reoccurs. 2. CAD S/p CABG : on asa, statin, Start BB 50 mg PO BID (HR/BP high)    3. DM2: On insulin 75/25, actos at home. Hgba1c of 8.6. DTS consult. Cont NPH 28 units BID. BS ACHS, change SSI to insulin resistent. May need to resume lispro 10 units w/ meals as BS > 200.      4. Chronic Diastolic CHF: supportive care. 5. HTN: Cont norvasc 10 mg PO daily, add metoprolol 50 mg PO BID, cozaar PTA--hold off d/t CKD. 6. HLD: on statin    7. ANDRY: uses cpap    8. CKD3: Creatinine stable 1.9. Avoid hypotension and nephrotoxic agents. Continue to monitor    9. BPH: not on meds currently    10. A flutter: Currently in Sinus rhythm. Patient previously stopped the Eliquis on his own prior to surgery. No plans to resume unless Aflutter reoccurs postop    11. Anemia: Postoperative secondary to acute blood loss.   Currently above transfusion threshold. Monitor daily CBC. 12. Atelectasis:  IS and TCDB. Increase activity. Wean oxygen to keep sat >92%. PA/lat today     13. Thrombocytopenia: Platelets [id number]. Resume baby aspirin. Change Pepcid to Protonix. 14. Nutrition: Cardiac w/ carb consistent     15. DVT/GI prophylaxis: SCD, PPI    Dispo: PT/OT/Cardiac Rehab. Working towards home w/ [facility] services likely over weekend. Still has AV Wires. Note: EKG obtained, noted to be in Afib. On PO amio. If persists, will need to restart eliquis (pt was on PTA, but stopped on his own).         Signed By: Olen Kehr, NP

## 2020-09-26 LAB
ALBUMIN SERPL-MCNC: 2.9 G/DL (ref 3.5–5)
ALBUMIN/GLOB SERPL: 0.9 {RATIO} (ref 1.1–2.2)
ALP SERPL-CCNC: 52 U/L (ref 45–117)
ALT SERPL-CCNC: 15 U/L (ref 12–78)
ANION GAP SERPL CALC-SCNC: 9 MMOL/L (ref 5–15)
AST SERPL-CCNC: 22 U/L (ref 15–37)
ATRIAL RATE: 87 BPM
BILIRUB SERPL-MCNC: 1.2 MG/DL (ref 0.2–1)
BUN SERPL-MCNC: 48 MG/DL (ref 6–20)
BUN/CREAT SERPL: 20 (ref 12–20)
CALCIUM SERPL-MCNC: 8.4 MG/DL (ref 8.5–10.1)
CALCULATED R AXIS, ECG10: -35 DEGREES
CALCULATED T AXIS, ECG11: 33 DEGREES
CHLORIDE SERPL-SCNC: 101 MMOL/L (ref 97–108)
CO2 SERPL-SCNC: 23 MMOL/L (ref 21–32)
CREAT SERPL-MCNC: 2.41 MG/DL (ref 0.7–1.3)
DIAGNOSIS, 93000: NORMAL
ERYTHROCYTE [DISTWIDTH] IN BLOOD BY AUTOMATED COUNT: 14.3 % (ref 11.5–14.5)
GLOBULIN SER CALC-MCNC: 3.4 G/DL (ref 2–4)
GLUCOSE BLD STRIP.AUTO-MCNC: 119 MG/DL (ref 65–100)
GLUCOSE BLD STRIP.AUTO-MCNC: 162 MG/DL (ref 65–100)
GLUCOSE BLD STRIP.AUTO-MCNC: 162 MG/DL (ref 65–100)
GLUCOSE BLD STRIP.AUTO-MCNC: 184 MG/DL (ref 65–100)
GLUCOSE SERPL-MCNC: 117 MG/DL (ref 65–100)
HCT VFR BLD AUTO: 39.1 % (ref 36.6–50.3)
HGB BLD-MCNC: 13.1 G/DL (ref 12.1–17)
MAGNESIUM SERPL-MCNC: 2.9 MG/DL (ref 1.6–2.4)
MCH RBC QN AUTO: 30.5 PG (ref 26–34)
MCHC RBC AUTO-ENTMCNC: 33.5 G/DL (ref 30–36.5)
MCV RBC AUTO: 91.1 FL (ref 80–99)
NRBC # BLD: 0 K/UL (ref 0–0.01)
NRBC BLD-RTO: 0 PER 100 WBC
PLATELET # BLD AUTO: 119 K/UL (ref 150–400)
PMV BLD AUTO: 10.3 FL (ref 8.9–12.9)
POTASSIUM SERPL-SCNC: 4 MMOL/L (ref 3.5–5.1)
PROT SERPL-MCNC: 6.3 G/DL (ref 6.4–8.2)
Q-T INTERVAL, ECG07: 390 MS
QRS DURATION, ECG06: 130 MS
QTC CALCULATION (BEZET), ECG08: 458 MS
RBC # BLD AUTO: 4.29 M/UL (ref 4.1–5.7)
SERVICE CMNT-IMP: ABNORMAL
SODIUM SERPL-SCNC: 133 MMOL/L (ref 136–145)
VENTRICULAR RATE, ECG03: 83 BPM
WBC # BLD AUTO: 11 K/UL (ref 4.1–11.1)

## 2020-09-26 PROCEDURE — 82962 GLUCOSE BLOOD TEST: CPT

## 2020-09-26 PROCEDURE — 74011250636 HC RX REV CODE- 250/636: Performed by: NURSE PRACTITIONER

## 2020-09-26 PROCEDURE — 85027 COMPLETE CBC AUTOMATED: CPT

## 2020-09-26 PROCEDURE — 74011250637 HC RX REV CODE- 250/637: Performed by: NURSE PRACTITIONER

## 2020-09-26 PROCEDURE — 74011250637 HC RX REV CODE- 250/637: Performed by: PHYSICIAN ASSISTANT

## 2020-09-26 PROCEDURE — 83735 ASSAY OF MAGNESIUM: CPT

## 2020-09-26 PROCEDURE — 74011636637 HC RX REV CODE- 636/637: Performed by: NURSE PRACTITIONER

## 2020-09-26 PROCEDURE — 65660000001 HC RM ICU INTERMED STEPDOWN

## 2020-09-26 PROCEDURE — 36415 COLL VENOUS BLD VENIPUNCTURE: CPT

## 2020-09-26 PROCEDURE — 80053 COMPREHEN METABOLIC PANEL: CPT

## 2020-09-26 RX ADMIN — OXYCODONE 5 MG: 5 TABLET ORAL at 06:58

## 2020-09-26 RX ADMIN — INSULIN LISPRO 3 UNITS: 100 INJECTION, SOLUTION INTRAVENOUS; SUBCUTANEOUS at 17:03

## 2020-09-26 RX ADMIN — PANTOPRAZOLE SODIUM 40 MG: 40 TABLET, DELAYED RELEASE ORAL at 06:53

## 2020-09-26 RX ADMIN — HUMAN INSULIN 28 UNITS: 100 INJECTION, SUSPENSION SUBCUTANEOUS at 08:09

## 2020-09-26 RX ADMIN — HUMAN INSULIN 28 UNITS: 100 INJECTION, SUSPENSION SUBCUTANEOUS at 17:03

## 2020-09-26 RX ADMIN — ASPIRIN 81 MG CHEWABLE TABLET 81 MG: 81 TABLET CHEWABLE at 08:09

## 2020-09-26 RX ADMIN — POLYETHYLENE GLYCOL 3350 17 G: 17 POWDER, FOR SOLUTION ORAL at 08:09

## 2020-09-26 RX ADMIN — ATORVASTATIN CALCIUM 40 MG: 40 TABLET, FILM COATED ORAL at 08:09

## 2020-09-26 RX ADMIN — APIXABAN 5 MG: 5 TABLET, FILM COATED ORAL at 20:35

## 2020-09-26 RX ADMIN — MUPIROCIN: 20 OINTMENT TOPICAL at 17:04

## 2020-09-26 RX ADMIN — AMIODARONE HYDROCHLORIDE 400 MG: 200 TABLET ORAL at 08:09

## 2020-09-26 RX ADMIN — DOCUSATE SODIUM 50MG AND SENNOSIDES 8.6MG 1 TABLET: 8.6; 5 TABLET, FILM COATED ORAL at 17:04

## 2020-09-26 RX ADMIN — CHLORHEXIDINE GLUCONATE 10 ML: 1.2 RINSE ORAL at 20:35

## 2020-09-26 RX ADMIN — Medication 10 ML: at 06:55

## 2020-09-26 RX ADMIN — MUPIROCIN: 20 OINTMENT TOPICAL at 08:09

## 2020-09-26 RX ADMIN — OXYCODONE 5 MG: 5 TABLET ORAL at 12:15

## 2020-09-26 RX ADMIN — Medication 10 ML: at 20:35

## 2020-09-26 RX ADMIN — CHLORHEXIDINE GLUCONATE 10 ML: 1.2 RINSE ORAL at 08:09

## 2020-09-26 RX ADMIN — HYDRALAZINE HYDROCHLORIDE 20 MG: 20 INJECTION INTRAMUSCULAR; INTRAVENOUS at 20:35

## 2020-09-26 RX ADMIN — METOPROLOL TARTRATE 50 MG: 50 TABLET, FILM COATED ORAL at 08:09

## 2020-09-26 RX ADMIN — MAGNESIUM OXIDE 400 MG: 400 TABLET ORAL at 08:09

## 2020-09-26 RX ADMIN — INSULIN LISPRO 2 UNITS: 100 INJECTION, SOLUTION INTRAVENOUS; SUBCUTANEOUS at 11:57

## 2020-09-26 RX ADMIN — METOPROLOL TARTRATE 50 MG: 50 TABLET, FILM COATED ORAL at 20:35

## 2020-09-26 RX ADMIN — MAGNESIUM OXIDE 400 MG: 400 TABLET ORAL at 17:04

## 2020-09-26 RX ADMIN — AMIODARONE HYDROCHLORIDE 400 MG: 200 TABLET ORAL at 20:35

## 2020-09-26 RX ADMIN — AMLODIPINE BESYLATE 10 MG: 5 TABLET ORAL at 08:09

## 2020-09-26 RX ADMIN — ACETAMINOPHEN 650 MG: 325 TABLET ORAL at 20:35

## 2020-09-26 NOTE — PROGRESS NOTES
Problem: Pressure Injury - Risk of  Goal: *Prevention of pressure injury  Description: Document Erich Scale and appropriate interventions in the flowsheet. Outcome: Progressing Towards Goal  Note: Pressure Injury Interventions:  Sensory Interventions: Assess changes in LOC         Activity Interventions: Increase time out of bed, Pressure redistribution bed/mattress(bed type)    Mobility Interventions: Pressure redistribution bed/mattress (bed type)    Nutrition Interventions: Discuss nutritional consult with provider, Document food/fluid/supplement intake    Friction and Shear Interventions: Minimize layers                Problem: Falls - Risk of  Goal: *Absence of Falls  Description: Document Sahil Fall Risk and appropriate interventions in the flowsheet.   Outcome: Progressing Towards Goal  Note: Fall Risk Interventions:  Mobility Interventions: Patient to call before getting OOB         Medication Interventions: Patient to call before getting OOB, Teach patient to arise slowly    Elimination Interventions: Call light in reach, Patient to call for help with toileting needs, Urinal in reach

## 2020-09-26 NOTE — PROGRESS NOTES
1930: Bedside shift change report given to Kadi Leon (oncoming nurse) by Sam Faith (offgoing nurse). Report included the following information SBAR, Kardex, Intake/Output, MAR and Cardiac Rhythm Aflutter. 0730: Bedside shift change report given to Miky Jose Angel (oncoming nurse) by Kadi Leon (offgoing nurse). Report included the following information SBAR, Kardex, Intake/Output, MAR and Cardiac Rhythm Aflutter.

## 2020-09-26 NOTE — PROGRESS NOTES
Charting and patient care of Brittany Dawn by Kina García from 9/25/20 1930 to 9/26/20 0815 was supervised and reviewed by this RN.

## 2020-09-26 NOTE — PROGRESS NOTES
0730 Bedside shift change report given to heather worthy rn (oncoming nurse) by Yazmin Reyes rn (offgoing nurse). Report included the following information SBAR, Kardex, Procedure Summary, Med Rec Status and Cardiac Rhythm aflutter. 1930 Bedside shift change report given to Layton Shrestha RN (oncoming nurse) by Ernesto Zhang rn (offgoing nurse). Report included the following information SBAR, Kardex and ED Summary.

## 2020-09-26 NOTE — PROGRESS NOTES
CSS Progress Note    Admit Date: 2020  POD:  4 Day Post-Op    Procedure:  Procedure(s):  REDO STERNOTOMY, AORTIC VALVE REPLACEMENT WITH 25MM BIOPROSTHETIC HEART VALVE; RIGHT FEMORAL CUTDOWN FOR CANNULATION; CRISTINA AND EPIAORTIC BY DR. Bobbi Lake         Subjective:   Pt seen with Dr. Sandra Arora. In a flutter 62s. Sitting in chair, no complaints. On RA, afebrile. -BM. Objective:   Vitals:  Blood pressure (!) 141/55, pulse (!) 59, temperature 97.8 °F (36.6 °C), resp. rate 18, height 6' (1.829 m), weight 313 lb 11.4 oz (142.3 kg), SpO2 95 %. Temp (24hrs), Av.4 °F (36.9 °C), Min:97.8 °F (36.6 °C), Max:99 °F (37.2 °C)    EKG/Rhythm:  Afib/flutter 60s     Oxygen Therapy:  Oxygen Therapy  O2 Sat (%): 95 % (20 1135)  Pulse via Oximetry: 81 beats per minute (20 1835)  O2 Device: Room air (20 0807)  O2 Flow Rate (L/min): 1 l/min (20 0344)  FIO2 (%): 50 % (20 1730)    CXR:   CXR Results  (Last 48 hours)               20 1051  XR CHEST PA LAT Final result    Impression:  IMPRESSION:       No significant interval change. Narrative:  Clinical history: Post op heart - may travel without RN   INDICATION:   Post op heart - may travel without RN   COMPARISON: 2020       FINDINGS:    PA and lateral views of the chest are obtained. The cardiopericardial silhouette is prominent, unchanged in appearance. Poststernotomy. . There is no pleural effusion, pneumothorax or focal   consolidation present. Admission Weight: Last Weight   Weight: 302 lb 7.5 oz (137.2 kg) Weight: 313 lb 11.4 oz (142.3 kg)     Intake / Output / Drain:  Current Shift:  0701 -  1900  In: -   Out: 150 [Urine:150]  Last 24 hrs.:     Intake/Output Summary (Last 24 hours) at 2020 1207  Last data filed at 2020 1135  Gross per 24 hour   Intake 560 ml   Output 725 ml   Net -165 ml       EXAM:  General:  No acute distress. Up in the chair.                     Lungs:   Clear to auscultation bilaterally   Incision:  Incision c/d/i   Heart:  Regular rate and rhythm, S1, S2 normal, no murmur, click, rub or gallop. Abdomen:   Soft, non-tender. Bowel sounds active. No masses,  No organomegaly. Extremities:  No edema. PPP. Neurologic:  Gross motor and sensory apparatus intact. Labs:   Recent Labs     20  1139  20  0406  20  0356   WBC  --   --  11.0  --  15.5*   HGB  --   --  13.1  --  14.3   HCT  --   --  39.1  --  43.5   PLT  --   --  119*  --  103*   NA  --   --  133*  --  134*   K  --   --  4.0  --  4.5   BUN  --   --  48*  --  32*   CREA  --   --  2.41*  --  1.90*   GLU  --   --  117*  --  183*   GLUCPOC 184*   < >  --    < >  --    INR  --   --   --   --  1.1    < > = values in this interval not displayed. Assessment:     Active Problems: Aortic valve stenosis, severe (2020)      Aortic stenosis (2020)      S/P AVR (aortic valve replacement) and aortoplasty (2020)      Overview: REDO STERNOTOMY      AORTIC VALVE REPLACEMENT WITH 25MM BIOPROSTHETIC HEART VALVE      RIGHT FEMORAL artery and vein CUTDOWN FOR CANNULATION         Plan/Recommendations/Medical Decision Makin. Mod to severe bioprosthetic AV stenosis s/p redo AVR: Resume asa 81 mg PO daily. Will eventually need 325 mg aspirin for valve once platelets recover. Will resume eliquis due to aflutter/afib    2.  CAD S/p CABG : on asa, statin, Start BB 50 mg PO BID (HR/BP high)    3. DM2: On insulin 75/25, actos at home. Hgba1c of 8.6. DTS consult. Cont NPH 28 units BID. BS ACHS, change SSI to insulin resistent. May need to resume lispro 10 units w/ meals as BS > 200.      4. Chronic Diastolic CHF: supportive care. 5. HTN: Cont norvasc 10 mg PO daily, add metoprolol 50 mg PO BID, cozaar PTA--hold off d/t CKD. 6. HLD: on statin    7. ANDRY: uses cpap    8. CKD3: Creatinine increased to 2.4 today. Avoid hypotension and nephrotoxic agents. Continue to monitor    9. BPH: not on meds currently    10. A flutter: Patient previously stopped the Eliquis on his own prior to surgery, will resume today. 11. Anemia: Postoperative secondary to acute blood loss. Currently above transfusion threshold. Monitor daily CBC. 12. Atelectasis:  IS and TCDB. Increase activity. 13. Thrombocytopenia: Platelets at 122A. On 81mg aspirin. On Protonix. 14. Nutrition: Cardiac w/ carb consistent     15. DVT/GI prophylaxis: SCD, PPI    16. Hyponatremia: 133 today. Will assess tomorrow. Dispo: PT/OT/Cardiac Rehab. Working towards home w/ [facility] services likely over weekend. Still has AV Wires.      Signed By: [place] Solis

## 2020-09-27 ENCOUNTER — APPOINTMENT (OUTPATIENT)
Dept: NON INVASIVE DIAGNOSTICS | Age: 70
DRG: 220 | End: 2020-09-27
Attending: PHYSICIAN ASSISTANT
Payer: MEDICARE

## 2020-09-27 LAB
ALBUMIN SERPL-MCNC: 2.9 G/DL (ref 3.5–5)
ALBUMIN/GLOB SERPL: 0.8 {RATIO} (ref 1.1–2.2)
ALP SERPL-CCNC: 49 U/L (ref 45–117)
ALT SERPL-CCNC: 13 U/L (ref 12–78)
ANION GAP SERPL CALC-SCNC: 5 MMOL/L (ref 5–15)
AST SERPL-CCNC: 17 U/L (ref 15–37)
BILIRUB SERPL-MCNC: 1.1 MG/DL (ref 0.2–1)
BUN SERPL-MCNC: 55 MG/DL (ref 6–20)
BUN/CREAT SERPL: 22 (ref 12–20)
CALCIUM SERPL-MCNC: 8.3 MG/DL (ref 8.5–10.1)
CHLORIDE SERPL-SCNC: 102 MMOL/L (ref 97–108)
CO2 SERPL-SCNC: 23 MMOL/L (ref 21–32)
CREAT SERPL-MCNC: 2.51 MG/DL (ref 0.7–1.3)
ERYTHROCYTE [DISTWIDTH] IN BLOOD BY AUTOMATED COUNT: 14.1 % (ref 11.5–14.5)
GLOBULIN SER CALC-MCNC: 3.5 G/DL (ref 2–4)
GLUCOSE BLD STRIP.AUTO-MCNC: 113 MG/DL (ref 65–100)
GLUCOSE BLD STRIP.AUTO-MCNC: 172 MG/DL (ref 65–100)
GLUCOSE BLD STRIP.AUTO-MCNC: 185 MG/DL (ref 65–100)
GLUCOSE BLD STRIP.AUTO-MCNC: 219 MG/DL (ref 65–100)
GLUCOSE SERPL-MCNC: 126 MG/DL (ref 65–100)
HCT VFR BLD AUTO: 39.1 % (ref 36.6–50.3)
HGB BLD-MCNC: 13.1 G/DL (ref 12.1–17)
MAGNESIUM SERPL-MCNC: 3 MG/DL (ref 1.6–2.4)
MCH RBC QN AUTO: 30.4 PG (ref 26–34)
MCHC RBC AUTO-ENTMCNC: 33.5 G/DL (ref 30–36.5)
MCV RBC AUTO: 90.7 FL (ref 80–99)
NRBC # BLD: 0 K/UL (ref 0–0.01)
NRBC BLD-RTO: 0 PER 100 WBC
OSMOLALITY SERPL: 291 MOSM/KG H2O
OSMOLALITY UR: 414 MOSM/KG H2O
PLATELET # BLD AUTO: 158 K/UL (ref 150–400)
PMV BLD AUTO: 10 FL (ref 8.9–12.9)
POTASSIUM SERPL-SCNC: 4.1 MMOL/L (ref 3.5–5.1)
PROT SERPL-MCNC: 6.4 G/DL (ref 6.4–8.2)
RBC # BLD AUTO: 4.31 M/UL (ref 4.1–5.7)
SERVICE CMNT-IMP: ABNORMAL
SODIUM SERPL-SCNC: 130 MMOL/L (ref 136–145)
WBC # BLD AUTO: 10.1 K/UL (ref 4.1–11.1)

## 2020-09-27 PROCEDURE — 74011636637 HC RX REV CODE- 636/637: Performed by: NURSE PRACTITIONER

## 2020-09-27 PROCEDURE — 83930 ASSAY OF BLOOD OSMOLALITY: CPT

## 2020-09-27 PROCEDURE — 83935 ASSAY OF URINE OSMOLALITY: CPT

## 2020-09-27 PROCEDURE — 74011250636 HC RX REV CODE- 250/636: Performed by: PHYSICIAN ASSISTANT

## 2020-09-27 PROCEDURE — 85027 COMPLETE CBC AUTOMATED: CPT

## 2020-09-27 PROCEDURE — 74011000250 HC RX REV CODE- 250: Performed by: THORACIC SURGERY (CARDIOTHORACIC VASCULAR SURGERY)

## 2020-09-27 PROCEDURE — C8929 TTE W OR WO FOL WCON,DOPPLER: HCPCS

## 2020-09-27 PROCEDURE — 80053 COMPREHEN METABOLIC PANEL: CPT

## 2020-09-27 PROCEDURE — 74011250637 HC RX REV CODE- 250/637: Performed by: NURSE PRACTITIONER

## 2020-09-27 PROCEDURE — 74011250636 HC RX REV CODE- 250/636: Performed by: THORACIC SURGERY (CARDIOTHORACIC VASCULAR SURGERY)

## 2020-09-27 PROCEDURE — 83735 ASSAY OF MAGNESIUM: CPT

## 2020-09-27 PROCEDURE — 65660000001 HC RM ICU INTERMED STEPDOWN

## 2020-09-27 PROCEDURE — 36415 COLL VENOUS BLD VENIPUNCTURE: CPT

## 2020-09-27 PROCEDURE — 82962 GLUCOSE BLOOD TEST: CPT

## 2020-09-27 PROCEDURE — 74011250637 HC RX REV CODE- 250/637: Performed by: PHYSICIAN ASSISTANT

## 2020-09-27 RX ORDER — POTASSIUM CHLORIDE 750 MG/1
40 TABLET, FILM COATED, EXTENDED RELEASE ORAL 2 TIMES DAILY
Status: DISCONTINUED | OUTPATIENT
Start: 2020-09-27 | End: 2020-09-28

## 2020-09-27 RX ORDER — ASPIRIN 325 MG
325 TABLET ORAL DAILY
Status: DISCONTINUED | OUTPATIENT
Start: 2020-09-28 | End: 2020-09-29 | Stop reason: HOSPADM

## 2020-09-27 RX ORDER — METOPROLOL TARTRATE 25 MG/1
25 TABLET, FILM COATED ORAL EVERY 12 HOURS
Status: DISCONTINUED | OUTPATIENT
Start: 2020-09-27 | End: 2020-09-28

## 2020-09-27 RX ORDER — AMIODARONE HYDROCHLORIDE 200 MG/1
200 TABLET ORAL EVERY 12 HOURS
Status: DISCONTINUED | OUTPATIENT
Start: 2020-09-27 | End: 2020-09-29 | Stop reason: HOSPADM

## 2020-09-27 RX ORDER — FUROSEMIDE 10 MG/ML
40 INJECTION INTRAMUSCULAR; INTRAVENOUS 2 TIMES DAILY
Status: DISCONTINUED | OUTPATIENT
Start: 2020-09-27 | End: 2020-09-29

## 2020-09-27 RX ORDER — TAMSULOSIN HYDROCHLORIDE 0.4 MG/1
0.4 CAPSULE ORAL DAILY
Status: DISCONTINUED | OUTPATIENT
Start: 2020-09-27 | End: 2020-09-29 | Stop reason: HOSPADM

## 2020-09-27 RX ADMIN — ACETAMINOPHEN 650 MG: 325 TABLET ORAL at 20:46

## 2020-09-27 RX ADMIN — Medication 10 ML: at 20:46

## 2020-09-27 RX ADMIN — AMIODARONE HYDROCHLORIDE 200 MG: 200 TABLET ORAL at 08:59

## 2020-09-27 RX ADMIN — AMLODIPINE BESYLATE 10 MG: 5 TABLET ORAL at 08:59

## 2020-09-27 RX ADMIN — INSULIN LISPRO 4 UNITS: 100 INJECTION, SOLUTION INTRAVENOUS; SUBCUTANEOUS at 12:15

## 2020-09-27 RX ADMIN — ATORVASTATIN CALCIUM 40 MG: 40 TABLET, FILM COATED ORAL at 08:59

## 2020-09-27 RX ADMIN — TAMSULOSIN HYDROCHLORIDE 0.4 MG: 0.4 CAPSULE ORAL at 12:17

## 2020-09-27 RX ADMIN — DOCUSATE SODIUM 50MG AND SENNOSIDES 8.6MG 1 TABLET: 8.6; 5 TABLET, FILM COATED ORAL at 17:17

## 2020-09-27 RX ADMIN — INSULIN LISPRO 3 UNITS: 100 INJECTION, SOLUTION INTRAVENOUS; SUBCUTANEOUS at 16:27

## 2020-09-27 RX ADMIN — CHLORHEXIDINE GLUCONATE 10 ML: 1.2 RINSE ORAL at 20:46

## 2020-09-27 RX ADMIN — METOPROLOL TARTRATE 50 MG: 50 TABLET, FILM COATED ORAL at 08:59

## 2020-09-27 RX ADMIN — ACETAMINOPHEN 650 MG: 325 TABLET ORAL at 12:34

## 2020-09-27 RX ADMIN — POTASSIUM CHLORIDE 40 MEQ: 750 TABLET, FILM COATED, EXTENDED RELEASE ORAL at 12:16

## 2020-09-27 RX ADMIN — SODIUM CHLORIDE 1.5 ML: 9 INJECTION INTRAMUSCULAR; INTRAVENOUS; SUBCUTANEOUS at 12:00

## 2020-09-27 RX ADMIN — POLYETHYLENE GLYCOL 3350 17 G: 17 POWDER, FOR SOLUTION ORAL at 08:59

## 2020-09-27 RX ADMIN — DOCUSATE SODIUM 50MG AND SENNOSIDES 8.6MG 1 TABLET: 8.6; 5 TABLET, FILM COATED ORAL at 08:59

## 2020-09-27 RX ADMIN — HUMAN INSULIN 28 UNITS: 100 INJECTION, SUSPENSION SUBCUTANEOUS at 17:17

## 2020-09-27 RX ADMIN — Medication 10 ML: at 07:34

## 2020-09-27 RX ADMIN — AMIODARONE HYDROCHLORIDE 200 MG: 200 TABLET ORAL at 20:46

## 2020-09-27 RX ADMIN — CHLORHEXIDINE GLUCONATE 10 ML: 1.2 RINSE ORAL at 08:59

## 2020-09-27 RX ADMIN — FUROSEMIDE 40 MG: 10 INJECTION, SOLUTION INTRAMUSCULAR; INTRAVENOUS at 17:17

## 2020-09-27 RX ADMIN — POTASSIUM CHLORIDE 40 MEQ: 750 TABLET, FILM COATED, EXTENDED RELEASE ORAL at 17:17

## 2020-09-27 RX ADMIN — APIXABAN 5 MG: 5 TABLET, FILM COATED ORAL at 08:59

## 2020-09-27 RX ADMIN — ASPIRIN 81 MG CHEWABLE TABLET 81 MG: 81 TABLET CHEWABLE at 08:59

## 2020-09-27 RX ADMIN — HUMAN INSULIN 28 UNITS: 100 INJECTION, SUSPENSION SUBCUTANEOUS at 08:59

## 2020-09-27 RX ADMIN — MUPIROCIN: 20 OINTMENT TOPICAL at 08:59

## 2020-09-27 RX ADMIN — PANTOPRAZOLE SODIUM 40 MG: 40 TABLET, DELAYED RELEASE ORAL at 07:33

## 2020-09-27 RX ADMIN — METOPROLOL TARTRATE 25 MG: 25 TABLET, FILM COATED ORAL at 20:46

## 2020-09-27 RX ADMIN — FUROSEMIDE 40 MG: 10 INJECTION, SOLUTION INTRAMUSCULAR; INTRAVENOUS at 12:17

## 2020-09-27 NOTE — PROGRESS NOTES
Bedside shift change report given to Annabella (oncoming nurse) by Hipolito Seip (offgoing nurse). Report included the following information SBAR, Intake/Output, MAR, Accordion, Recent Results, Med Rec Status and Cardiac Rhythm AFlutter. 2204: Pt converted to Sinus Dirk Vankel with 1st AVB HR 46-55; BP stable    2230: Dr. Marium Samano notified of rhythm change; no new orders    Bedside shift change report given to Hipolito Seip (oncoming nurse) by Jorge Arevalo (offgoing nurse). Report included the following information SBAR, Intake/Output, MAR, Accordion, Recent Results, Med Rec Status and Cardiac Rhythm SB/NSR w/ 1st AVB.

## 2020-09-27 NOTE — PROGRESS NOTES
1930: Bedside shift change report given to Eloy Otero (oncoming nurse) by Pinky Beckwith (offgoing nurse). Report included the following information SBAR, Intake/Output, MAR, Accordion, Recent Results, Med Rec Status and Cardiac Rhythm Sinus Vipin 40s-50s w/ 1st AVB .     0600: Bedside shift change report given to Lauro (oncoming nurse) by Eloy Otero (offgoing nurse). Report included the following information SBAR, Intake/Output, MAR, Accordion, Recent Results, Med Rec Status and Cardiac Rhythm Sinus Vipin,1st AVB.

## 2020-09-27 NOTE — PROGRESS NOTES
Problem: Pressure Injury - Risk of  Goal: *Prevention of pressure injury  Description: Document Erich Scale and appropriate interventions in the flowsheet. Outcome: Progressing Towards Goal  Note: Pressure Injury Interventions:  Sensory Interventions: Assess changes in LOC         Activity Interventions: Increase time out of bed    Mobility Interventions: Pressure redistribution bed/mattress (bed type)    Nutrition Interventions: Discuss nutritional consult with provider, Document food/fluid/supplement intake    Friction and Shear Interventions: Minimize layers                Problem: Falls - Risk of  Goal: *Absence of Falls  Description: Document Sahil Fall Risk and appropriate interventions in the flowsheet. Outcome: Progressing Towards Goal  Note: Fall Risk Interventions:  Mobility Interventions: Patient to call before getting OOB, Strengthening exercises (ROM-active/passive)         Medication Interventions: Patient to call before getting OOB, Teach patient to arise slowly    Elimination Interventions: Call light in reach, Patient to call for help with toileting needs              Problem: Pain  Goal: *Control of Pain  Outcome: Not Progressing Towards Goal     Problem: Diabetes Self-Management  Goal: *Disease process and treatment process  Description: Define diabetes and identify own type of diabetes; list 3 options for treating diabetes. Outcome: Progressing Towards Goal  Goal: *Incorporating physical activity into lifestyle  Description: State effect of exercise on blood glucose levels. Outcome: Progressing Towards Goal  Goal: *Monitoring blood glucose, interpreting and using results  Description: Identify recommended blood glucose targets  and personal targets.   Outcome: Progressing Towards Goal

## 2020-09-27 NOTE — PROGRESS NOTES
CSS Progress Note    Admit Date: 2020  POD:  5 Day Post-Op    Procedure:  Procedure(s):  REDO STERNOTOMY, AORTIC VALVE REPLACEMENT WITH 25MM BIOPROSTHETIC HEART VALVE; RIGHT FEMORAL CUTDOWN FOR CANNULATION; CRISTINA AND EPIAORTIC BY DR. Blas Chavez         Subjective:   Pt seen with Dr. Angle Gray. In SB 46s. Sitting in chair, no complaints. On RA, afebrile. +BM. Objective:   Vitals:  Blood pressure 135/62, pulse (!) 51, temperature 97.5 °F (36.4 °C), resp. rate 16, height 6' (1.829 m), weight 314 lb 2.5 oz (142.5 kg), SpO2 95 %. Temp (24hrs), Av.7 °F (36.5 °C), Min:97.4 °F (36.3 °C), Max:98 °F (36.7 °C)    EKG/Rhythm:  SB 50-60s     Oxygen Therapy: RA    CXR:   CXR Results  (Last 48 hours)    None        Admission Weight: Last Weight   Weight: 302 lb 7.5 oz (137.2 kg) Weight: 314 lb 2.5 oz (142.5 kg)     Intake / Output / Drain:  Current Shift:  0701 -  1900  In: 240 [P.O.:240]  Out: 400 [Urine:400]  Last 24 hrs.:     Intake/Output Summary (Last 24 hours) at 2020 1119  Last data filed at 2020 0732  Gross per 24 hour   Intake 1020 ml   Output 1400 ml   Net -380 ml       EXAM:  General:  No acute distress. Up in the chair. Lungs:   Clear to auscultation bilaterally   Incision:  Incision c/d/i   Heart:  Regular rate and rhythm, S1, S2 normal, no murmur, click, rub or gallop. Abdomen:   Soft, non-tender. Bowel sounds active. No masses,  No organomegaly. Extremities:  No edema. PPP. Neurologic:  Gross motor and sensory apparatus intact. Labs:   Recent Labs     20  0623 20  0428  20  0356   WBC  --  10.1   < > 15.5*   HGB  --  13.1   < > 14.3   HCT  --  39.1   < > 43.5   PLT  --  158   < > 103*   NA  --  130*   < > 134*   K  --  4.1   < > 4.5   BUN  --  55*   < > 32*   CREA  --  2.51*   < > 1.90*   GLU  --  126*   < > 183*   GLUCPOC 113*  --    < >  --    INR  --   --   --  1.1    < > = values in this interval not displayed.         Assessment: Active Problems: Aortic valve stenosis, severe (2020)      Aortic stenosis (2020)      S/P AVR (aortic valve replacement) and aortoplasty (2020)      Overview: REDO STERNOTOMY      AORTIC VALVE REPLACEMENT WITH 25MM BIOPROSTHETIC HEART VALVE      RIGHT FEMORAL artery and vein CUTDOWN FOR CANNULATION         Plan/Recommendations/Medical Decision Makin. Mod to severe bioprosthetic AV stenosis s/p redo AVR: 325 mg ASA. Post-op TTE today. 2. CAD S/p CABG : on asa, statin, Dec BB to 25 mg PO BID due to HR. 3. DM2: On insulin 75/25, actos at home. Hgba1c of 8.6. DTS consult. Cont NPH 28 units BID. BS ACHS, change SSI to insulin resistent. May need to resume lispro 10 units w/ meals as BS > 200.      4. Chronic Diastolic CHF: supportive care. 5. HTN: Cont norvasc 10 mg PO daily, on metoprolol 25 PO BID, adding lasix today. Cozaar PTA--hold off d/t CKD. 6. HLD: on statin    7. ANDRY: uses cpap    8. CKD3: Creatinine increased to 2.51 today. 40 mg IV lasix BID per Dr. Leisa Hobson. Avoid hypotension and nephrotoxic agents. Continue to monitor    9. BPH: Start flomax today. 10. A flutter: Resolved. Now SB.     11. Anemia: Postoperative secondary to acute blood loss. Currently above transfusion threshold. Monitor daily CBC. 12. Atelectasis:  IS and TCDB. Increase activity. 13. Thrombocytopenia: Platelets at 901T. On 325mg aspirin. On Protonix. 14. Nutrition: Cardiac w/ carb consistent     15. DVT/GI prophylaxis: SCD, PPI    16. Hyponatremia: 130 today. Will assess urine/serum osmolality. Lasix per Dr. Leisa Hobson. Dispo: PT/OT/Cardiac Rehab. Working towards home w/ [facility] services. Still has AV Wires.      Signed By: KY Zuñiga

## 2020-09-27 NOTE — PROGRESS NOTES
0730 Bedside shift change report given to heather worthy rn (oncoming nurse) by Chris Santizo rn (offgoing nurse). Report included the following information SBAR, Kardex, OR Summary, Recent Results, Med Rec Status and Cardiac Rhythm nsr.     1674 RN spoke to [facility] to add flomax to assist with flow issues. Pt ambulated around nurses station with PCT. [address] spoke to [facility], Pt has heartrate in the high 40's, pt is not symptomatic. Pt has pacer wires isolated and taped, RN placed pacer box in room. Monitor tech to alert RN if HR goes below 45.    1930 Bedside shift change report given to tacho garcia rn (oncoming nurse) by Dorina Lawson rn  (offgoing nurse). Report included the following information SBAR, Kardex, Procedure Summary, Intake/Output, MAR, Cardiac Rhythm nsr and Alarm Parameters .

## 2020-09-28 LAB
ALBUMIN SERPL-MCNC: 2.7 G/DL (ref 3.5–5)
ALBUMIN/GLOB SERPL: 0.8 {RATIO} (ref 1.1–2.2)
ALP SERPL-CCNC: 50 U/L (ref 45–117)
ALT SERPL-CCNC: 13 U/L (ref 12–78)
ANION GAP SERPL CALC-SCNC: 7 MMOL/L (ref 5–15)
AST SERPL-CCNC: 14 U/L (ref 15–37)
BILIRUB SERPL-MCNC: 0.9 MG/DL (ref 0.2–1)
BUN SERPL-MCNC: 54 MG/DL (ref 6–20)
BUN/CREAT SERPL: 22 (ref 12–20)
CALCIUM SERPL-MCNC: 8.4 MG/DL (ref 8.5–10.1)
CHLORIDE SERPL-SCNC: 103 MMOL/L (ref 97–108)
CO2 SERPL-SCNC: 23 MMOL/L (ref 21–32)
CREAT SERPL-MCNC: 2.5 MG/DL (ref 0.7–1.3)
ECHO AV AREA PEAK VELOCITY: 1.13 CM2
ECHO AV AREA PEAK VELOCITY: 1.22 CM2
ECHO AV AREA PEAK VELOCITY: 1.25 CM2
ECHO AV AREA PEAK VELOCITY: 1.28 CM2
ECHO AV AREA VTI: 1.24 CM2
ECHO AV AREA VTI: 1.47 CM2
ECHO AV MEAN GRADIENT: 18.72 MMHG
ECHO AV PEAK GRADIENT: 26.55 MMHG
ECHO AV PEAK GRADIENT: 32.28 MMHG
ECHO AV PEAK VELOCITY: 257.64 CM/S
ECHO AV PEAK VELOCITY: 284.06 CM/S
ECHO AV VTI: 60.77 CM
ECHO LV E' LATERAL VELOCITY: 9.77 CM/S
ECHO LV E' SEPTAL VELOCITY: 6.66 CM/S
ECHO LV INTERNAL DIMENSION DIASTOLIC: 5.72 CM (ref 4.2–5.9)
ECHO LV INTERNAL DIMENSION SYSTOLIC: 3.61 CM
ECHO LV IVSD: 1.4 CM (ref 0.6–1)
ECHO LV MASS 2D: 341.6 G (ref 88–224)
ECHO LV MASS INDEX 2D: 132.5 G/M2 (ref 49–115)
ECHO LV POSTERIOR WALL DIASTOLIC: 1.3 CM (ref 0.6–1)
ECHO LVOT DIAM: 2.02 CM
ECHO LVOT PEAK GRADIENT: 4.02 MMHG
ECHO LVOT PEAK VELOCITY: 100.23 CM/S
ECHO LVOT SV: 75.3 ML
ECHO LVOT VTI: 23.47 CM
ECHO MV A VELOCITY: 73.34 CM/S
ECHO MV AREA PHT: 4.22 CM2
ECHO MV E DECELERATION TIME (DT): 0.18 S
ECHO MV E VELOCITY: 112.83 CM/S
ECHO MV E/A RATIO: 1.54
ECHO MV E/E' LATERAL: 11.55
ECHO MV E/E' RATIO (AVERAGED): 14.25
ECHO MV E/E' SEPTAL: 16.94
ECHO MV PRESSURE HALF TIME (PHT): 0.05 S
ECHO PV MAX VELOCITY: 160.09 CM/S
ECHO PV PEAK INSTANTANEOUS GRADIENT SYSTOLIC: 10.25 MMHG
ECHO RV INTERNAL DIMENSION: 3.25 CM
ECHO RV TAPSE: 1.69 CM (ref 1.5–2)
ECHO TV REGURGITANT MAX VELOCITY: 134.95 CM/S
ECHO TV REGURGITANT MAX VELOCITY: 148.99 CM/S
ECHO TV REGURGITANT MAX VELOCITY: 148.99 CM/S
ECHO TV REGURGITANT PEAK GRADIENT: 7.28 MMHG
ECHO TV REGURGITANT PEAK GRADIENT: 8.88 MMHG
ECHO TV REGURGITANT PEAK GRADIENT: 8.88 MMHG
ERYTHROCYTE [DISTWIDTH] IN BLOOD BY AUTOMATED COUNT: 14 % (ref 11.5–14.5)
GLOBULIN SER CALC-MCNC: 3.3 G/DL (ref 2–4)
GLUCOSE BLD STRIP.AUTO-MCNC: 116 MG/DL (ref 65–100)
GLUCOSE BLD STRIP.AUTO-MCNC: 121 MG/DL (ref 65–100)
GLUCOSE BLD STRIP.AUTO-MCNC: 122 MG/DL (ref 65–100)
GLUCOSE BLD STRIP.AUTO-MCNC: 172 MG/DL (ref 65–100)
GLUCOSE SERPL-MCNC: 114 MG/DL (ref 65–100)
HCT VFR BLD AUTO: 37.1 % (ref 36.6–50.3)
HGB BLD-MCNC: 12.5 G/DL (ref 12.1–17)
MAGNESIUM SERPL-MCNC: 3 MG/DL (ref 1.6–2.4)
MCH RBC QN AUTO: 30.5 PG (ref 26–34)
MCHC RBC AUTO-ENTMCNC: 33.7 G/DL (ref 30–36.5)
MCV RBC AUTO: 90.5 FL (ref 80–99)
NRBC # BLD: 0 K/UL (ref 0–0.01)
NRBC BLD-RTO: 0 PER 100 WBC
PLATELET # BLD AUTO: 156 K/UL (ref 150–400)
PMV BLD AUTO: 9.9 FL (ref 8.9–12.9)
POTASSIUM SERPL-SCNC: 4.1 MMOL/L (ref 3.5–5.1)
PROT SERPL-MCNC: 6 G/DL (ref 6.4–8.2)
RBC # BLD AUTO: 4.1 M/UL (ref 4.1–5.7)
SERVICE CMNT-IMP: ABNORMAL
SODIUM SERPL-SCNC: 133 MMOL/L (ref 136–145)
WBC # BLD AUTO: 7.9 K/UL (ref 4.1–11.1)

## 2020-09-28 PROCEDURE — 82962 GLUCOSE BLOOD TEST: CPT

## 2020-09-28 PROCEDURE — 83735 ASSAY OF MAGNESIUM: CPT

## 2020-09-28 PROCEDURE — 74011250637 HC RX REV CODE- 250/637: Performed by: NURSE PRACTITIONER

## 2020-09-28 PROCEDURE — 74011636637 HC RX REV CODE- 636/637: Performed by: NURSE PRACTITIONER

## 2020-09-28 PROCEDURE — 74011250637 HC RX REV CODE- 250/637: Performed by: PHYSICIAN ASSISTANT

## 2020-09-28 PROCEDURE — 99231 SBSQ HOSP IP/OBS SF/LOW 25: CPT | Performed by: CLINICAL NURSE SPECIALIST

## 2020-09-28 PROCEDURE — 97116 GAIT TRAINING THERAPY: CPT

## 2020-09-28 PROCEDURE — 65660000001 HC RM ICU INTERMED STEPDOWN

## 2020-09-28 PROCEDURE — 36415 COLL VENOUS BLD VENIPUNCTURE: CPT

## 2020-09-28 PROCEDURE — 80053 COMPREHEN METABOLIC PANEL: CPT

## 2020-09-28 PROCEDURE — 74011250637 HC RX REV CODE- 250/637: Performed by: INTERNAL MEDICINE

## 2020-09-28 PROCEDURE — 74011250636 HC RX REV CODE- 250/636: Performed by: PHYSICIAN ASSISTANT

## 2020-09-28 PROCEDURE — 93005 ELECTROCARDIOGRAM TRACING: CPT

## 2020-09-28 PROCEDURE — 85027 COMPLETE CBC AUTOMATED: CPT

## 2020-09-28 RX ORDER — CARVEDILOL 12.5 MG/1
12.5 TABLET ORAL 2 TIMES DAILY WITH MEALS
Status: DISCONTINUED | OUTPATIENT
Start: 2020-09-28 | End: 2020-09-29 | Stop reason: HOSPADM

## 2020-09-28 RX ORDER — CLONIDINE HYDROCHLORIDE 0.1 MG/1
0.1 TABLET ORAL 2 TIMES DAILY
Status: DISCONTINUED | OUTPATIENT
Start: 2020-09-28 | End: 2020-09-29 | Stop reason: HOSPADM

## 2020-09-28 RX ORDER — SPIRONOLACTONE 25 MG/1
25 TABLET ORAL DAILY
Status: DISCONTINUED | OUTPATIENT
Start: 2020-09-28 | End: 2020-09-29 | Stop reason: HOSPADM

## 2020-09-28 RX ADMIN — FUROSEMIDE 40 MG: 10 INJECTION, SOLUTION INTRAMUSCULAR; INTRAVENOUS at 08:04

## 2020-09-28 RX ADMIN — CLONIDINE HYDROCHLORIDE 0.1 MG: 0.1 TABLET ORAL at 08:03

## 2020-09-28 RX ADMIN — Medication 10 ML: at 15:09

## 2020-09-28 RX ADMIN — CLONIDINE HYDROCHLORIDE 0.1 MG: 0.1 TABLET ORAL at 17:06

## 2020-09-28 RX ADMIN — METOPROLOL TARTRATE 25 MG: 25 TABLET, FILM COATED ORAL at 08:04

## 2020-09-28 RX ADMIN — TAMSULOSIN HYDROCHLORIDE 0.4 MG: 0.4 CAPSULE ORAL at 08:04

## 2020-09-28 RX ADMIN — ATORVASTATIN CALCIUM 40 MG: 40 TABLET, FILM COATED ORAL at 08:03

## 2020-09-28 RX ADMIN — Medication 10 ML: at 22:01

## 2020-09-28 RX ADMIN — ACETAMINOPHEN 650 MG: 325 TABLET ORAL at 07:16

## 2020-09-28 RX ADMIN — DOCUSATE SODIUM 50MG AND SENNOSIDES 8.6MG 1 TABLET: 8.6; 5 TABLET, FILM COATED ORAL at 08:04

## 2020-09-28 RX ADMIN — AMIODARONE HYDROCHLORIDE 200 MG: 200 TABLET ORAL at 22:01

## 2020-09-28 RX ADMIN — DOCUSATE SODIUM 50MG AND SENNOSIDES 8.6MG 1 TABLET: 8.6; 5 TABLET, FILM COATED ORAL at 17:06

## 2020-09-28 RX ADMIN — POTASSIUM CHLORIDE 40 MEQ: 750 TABLET, FILM COATED, EXTENDED RELEASE ORAL at 08:04

## 2020-09-28 RX ADMIN — ASPIRIN 325 MG ORAL TABLET 325 MG: 325 PILL ORAL at 08:03

## 2020-09-28 RX ADMIN — HUMAN INSULIN 28 UNITS: 100 INJECTION, SUSPENSION SUBCUTANEOUS at 08:03

## 2020-09-28 RX ADMIN — CHLORHEXIDINE GLUCONATE 10 ML: 1.2 RINSE ORAL at 22:01

## 2020-09-28 RX ADMIN — HUMAN INSULIN 28 UNITS: 100 INJECTION, SUSPENSION SUBCUTANEOUS at 17:06

## 2020-09-28 RX ADMIN — SPIRONOLACTONE 25 MG: 25 TABLET ORAL at 17:06

## 2020-09-28 RX ADMIN — FUROSEMIDE 40 MG: 10 INJECTION, SOLUTION INTRAMUSCULAR; INTRAVENOUS at 17:06

## 2020-09-28 RX ADMIN — POLYETHYLENE GLYCOL 3350 17 G: 17 POWDER, FOR SOLUTION ORAL at 08:04

## 2020-09-28 RX ADMIN — AMLODIPINE BESYLATE 10 MG: 5 TABLET ORAL at 08:03

## 2020-09-28 RX ADMIN — DIPHENHYDRAMINE HYDROCHLORIDE 25 MG: 25 CAPSULE ORAL at 22:01

## 2020-09-28 RX ADMIN — PANTOPRAZOLE SODIUM 40 MG: 40 TABLET, DELAYED RELEASE ORAL at 06:54

## 2020-09-28 RX ADMIN — AMIODARONE HYDROCHLORIDE 200 MG: 200 TABLET ORAL at 08:03

## 2020-09-28 RX ADMIN — CHLORHEXIDINE GLUCONATE 10 ML: 1.2 RINSE ORAL at 08:04

## 2020-09-28 RX ADMIN — CARVEDILOL 12.5 MG: 12.5 TABLET, FILM COATED ORAL at 17:06

## 2020-09-28 RX ADMIN — Medication 10 ML: at 06:56

## 2020-09-28 NOTE — PROGRESS NOTES
768 [facility] visit. Mrs. Baldo Buchanan was visiting. Mr. Baldo Buchanan was sitting in his chair. He is hoping to go home maybe tomorrow. Prayer and communion offered to couple.     ESVIN Ramos, RN, ACSW, LCSW   Page:  [phone]

## 2020-09-28 NOTE — PROGRESS NOTES
Problem: Mobility Impaired (Adult and Pediatric)  Goal: *Acute Goals and Plan of Care (Insert Text)  Description: FUNCTIONAL STATUS PRIOR TO ADMISSION: Patient was independent and active without use of DME.     HOME SUPPORT PRIOR TO ADMISSION: The patient lived with wife but did not require assist.    Physical Therapy Goals  Initiated 9/23/2020  1. Patient will move from supine to sit and sit to supine , scoot up and down, and roll side to side in bed with modified independence within 5 days. 2.  Patient will perform sit to/from stand with modified independence within 5 days. 3.  Patient will ambulate 300 feet with least restrictive assistive device and independence within 5 days. 4.  Patient will ascend/descend 12 stairs with handrail(s) with modified independence within 5 days. 5.  Patient will perform cardiac exercises per protocol with independence within 5 days. 6.  Patient will verbally recall and functionally demonstrate mindful-based movements (\"move in the tube\") principles without cues within 5 days. Outcome: Progressing Towards Goal   PHYSICAL THERAPY TREATMENT  Patient: Kary Mckinney (85 y.o. male)  Date: 9/28/2020  Diagnosis: Aortic stenosis [I35.0]   <principal problem not specified>  Procedure(s) (LRB):  REDO STERNOTOMY, AORTIC VALVE REPLACEMENT WITH 25MM BIOPROSTHETIC HEART VALVE; RIGHT FEMORAL CUTDOWN FOR CANNULATION; CRISTINA AND EPIAORTIC BY DR. Amanda Brennan  (N/A) 6 Days Post-Op  Precautions: Fall  Chart, physical therapy assessment, plan of care and goals were reviewed. ASSESSMENT  Patient continues with skilled PT services and is progressing towards goals. Patient is independent with sit<->stand performing with greater ease and improved control. He is able to ambulate 250 ft with no device, steady gait and required no rest break. Noted improved arm swing, geena and balance today.       Patient is verbalizing and is demonstrating understanding of mindful-based movements (\"move in the tube\") principles of keeping UEs proximal to ribcage to prevent lateral pull on the sternum during load-bearing activities with  no  cues required for compliance. Current Level of Function Impacting Discharge (mobility/balance): supervision    Other factors to consider for discharge: PLOF indep; re-do sternotomy         PLAN :  Patient continues to benefit from skilled intervention to address the above impairments. Continue treatment per established plan of care. to address goals. Recommendation for discharge: (in order for the patient to meet his/her long term goals)  Physical therapy at least 2 days/week in the home     This discharge recommendation:  Has been made in collaboration with the attending provider and/or case management    IF patient discharges home will need the following DME: none       SUBJECTIVE:   Patient stated I can walk again.     OBJECTIVE DATA SUMMARY:   Patient mobilized on continuous portable monitor/telemetry. Critical Behavior:  Neurologic State: Alert  Orientation Level: Oriented X4  Cognition: Appropriate for age attention/concentration  Safety/Judgement: Good awareness of safety precautions    Functional Mobility Training:  Bed Mobility:                      Transfers:  Sit to Stand: Supervision  Stand to Sit: Supervision                               Balance:  Sitting: Intact; Without support  Standing: Impaired; Without support  Standing - Static: Good  Standing - Dynamic : Good;Fair    Ambulation/Gait Training:  Distance (ft): 250 Feet (ft)  Assistive Device: Gait belt           Gait Abnormalities: Decreased step clearance;Trunk sway increased        Base of Support: Widened     Speed/Sabi: Slow                      Cardiac diagnosis intervention:  Patient instructed and educated on mindful movement principles based on Move in The Tube concept to include maintaining bilateral elbows close to rib cage when performing any load-bearing activity such as getting in/out of bed, pushing up from a chair, opening a door, or lifting a box. Therapeutic Exercises:   Patient instructed on the benefits and demonstrated cardiac exercises while sitting and standing with Supervision. Instructed and indicated understanding on how to progress reps, sets against gravity, pacing through progressive muscle strengthening standing based on surgeon clearance for more weight in prep for basic and instrumental ADLs. Instruction on the use of household items in place of weights as needed.      CARDIAC  EXERCISE   Sets   Reps   Active Active Assist   Passive Self ROM   Comments   Shoulder flexion 1 5 [x]                                            []                                            []                                            []                                               Shoulder abduction 1      5 [x]                                            []                                            []                                            []                                               Scapular elevation 1 5 [x]                                            []                                            []                                            []                                               Scapular retraction 1 5 [x]                                            []                                            []                                            []                                               Trunk rotation 1 5 [x]                                            []                                            []                                            []                                               Trunk sidebending 1 5 [x]                                            []                                            []                                            []                                                  []                                            [] []                                            []                                                   Pain Rating:  Voiced no pain    Activity Tolerance:   Good and Fair  Please refer to the flowsheet for vital signs taken during this treatment. After treatment patient left in no apparent distress:   Sitting in chair, Call bell within reach, and Caregiver / family present    COMMUNICATION/COLLABORATION:   The patients plan of care was discussed with: Registered nurse.      Colin Weeks, PT   Time Calculation: 15 mins

## 2020-09-28 NOTE — PROGRESS NOTES
[facility] Cardiology Progress Note    Date of Service: 9/28/2020    Subjective:  No acute events overnight. Denies chest pain, dyspnea, lightheadedness, syncope. Edema is improving. Good diuresis on IV furosemide.     Objective:    Visit Vitals  BP (!) 166/62 (BP 1 Location: Right arm, BP Patient Position: At rest;Post activity)   Pulse (!) 57   Temp 98.3 °F (36.8 °C)   Resp 18   Ht 6' (1.829 m)   Wt 141.2 kg (311 lb 4.6 oz)   SpO2 93%   BMI 42.22 kg/m²         Intake/Output Summary (Last 24 hours) at 9/28/2020 1533  Last data filed at 9/28/2020 1508  Gross per 24 hour   Intake 720 ml   Output 2875 ml   Net -2155 ml        Physical Exam  GEN: NAD, appears stated age  HEENT: EOMI, MMM, OP clear  NECK: Normal JVP  CV: RRR, normal S1 and S2, II/VI systolic murmur at RUSB  LUNGS: CTAB, no W/R/R  ABD: NABS, soft, NT/ND  EXT: 1-2+ pitting edema in b/l LE, 2+ distal pulses  PSYCH: Mood and affect normal  NEURO: AAO, MAEW, face symmetrical, speech intact    Current Facility-Administered Medications   Medication Dose Route Frequency    cloNIDine HCL (CATAPRES) tablet 0.1 mg  0.1 mg Oral BID    amiodarone (CORDARONE) tablet 200 mg  200 mg Oral Q12H    tamsulosin (FLOMAX) capsule 0.4 mg  0.4 mg Oral DAILY    aspirin tablet 325 mg  325 mg Oral DAILY    furosemide (LASIX) injection 40 mg  40 mg IntraVENous BID    potassium chloride SR (KLOR-CON 10) tablet 40 mEq  40 mEq Oral BID    metoprolol tartrate (LOPRESSOR) tablet 25 mg  25 mg Oral Q12H    insulin NPH (NOVOLIN N, HUMULIN N) injection 28 Units  28 Units SubCUTAneous BID    insulin lispro (HUMALOG) injection   SubCUTAneous AC&HS    acetaminophen (TYLENOL) tablet 650 mg  650 mg Oral Q4H PRN    amLODIPine (NORVASC) tablet 10 mg  10 mg Oral DAILY    hydrALAZINE (APRESOLINE) 20 mg/mL injection 20 mg  20 mg IntraVENous Q6H PRN    pantoprazole (PROTONIX) tablet 40 mg  40 mg Oral ACB    sodium chloride (NS) flush 5-40 mL  5-40 mL IntraVENous Q8H    sodium chloride (NS) flush 5-40 mL  5-40 mL IntraVENous PRN    oxyCODONE IR (ROXICODONE) tablet 5 mg  5 mg Oral Q4H PRN    naloxone (NARCAN) injection 0.4 mg  0.4 mg IntraVENous PRN    ondansetron (ZOFRAN) injection 4 mg  4 mg IntraVENous Q4H PRN    albuterol (PROVENTIL VENTOLIN) nebulizer solution 2.5 mg  2.5 mg Nebulization Q4H PRN    chlorhexidine (PERIDEX) 0.12 % mouthwash 10 mL  10 mL Oral Q12H    bisacodyL (DULCOLAX) suppository 10 mg  10 mg Rectal DAILY PRN    senna-docusate (PERICOLACE) 8.6-50 mg per tablet 1 Tab  1 Tab Oral BID    polyethylene glycol (MIRALAX) packet 17 g  17 g Oral DAILY    glucose chewable tablet 16 g  4 Tab Oral PRN    glucagon (GLUCAGEN) injection 1 mg  1 mg IntraMUSCular PRN    dextrose 10% infusion 0-250 mL  0-250 mL IntraVENous PRN    diphenhydrAMINE (BENADRYL) capsule 25 mg  25 mg Oral QHS PRN    melatonin tablet 3 mg  3 mg Oral QHS PRN    oxyCODONE IR (ROXICODONE) tablet 10 mg  10 mg Oral Q4H PRN    atorvastatin (LIPITOR) tablet 40 mg  40 mg Oral DAILY    influenza vaccine 2020-21 (6 mos+)(PF) (FLUARIX/FLULAVAL/FLUZONE QUAD) injection 0.5 mL  0.5 mL IntraMUSCular PRIOR TO DISCHARGE       Data Reviewed:  Recent Labs     09/28/20 0422 09/27/20 0428 09/26/20  0406   * 130* 133*   K 4.1 4.1 4.0    102 101   CO2 23 23 23   * 126* 117*   BUN 54* 55* 48*   CREA 2.50* 2.51* 2.41*   CA 8.4* 8.3* 8.4*   MG 3.0* 3.0* 2.9*   ALB 2.7* 2.9* 2.9*   ALT 13 13 15     Recent Labs     09/28/20 0422 09/27/20 0428 09/26/20  0406   WBC 7.9 10.1 11.0   HGB 12.5 13.1 13.1   HCT 37.1 39.1 39.1    158 119*     Lab Results   Component Value Date/Time    Specimen Description: BLOOD 05/14/2014 02:49 PM     Lab Results   Component Value Date/Time    Culture result: MRSA NOT PRESENT 04/01/2015 01:30 PM    Culture result:  04/01/2015 01:30 PM         Screening of patient nares for MRSA is for surveillance purposes and, if positive, to facilitate isolation considerations in high risk settings. It is not intended for automatic decolonization interventions per se as regimens are not sufficiently effective to warrant routine use. Culture result: NO GROWTH 5 DAYS 03/06/2015 06:39 AM       All Cardiac Markers in the last 24 hours:  No results found for: CPK, CK, CKMMB, CKMB, RCK3, CKMBT, CKMBPOC, CKNDX, CKND1, ADDISON, TROPT, TROIQ, JOHN, TROPT, TNIPOC, BNP, BNPP, BNPNT    Telemetry (personally reviewed): Sinus bradycardia    Assessment:  1. 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 Candy Jamison on 9/22/20   2. CAD s/p 2V-CABG at the time of the initial bio-AVR (10/29/14) with LIMA-LAD and SVG-ramus intermedius; patent at cath on 8/10/20  3. HTN  4. HL  5. ANDRY on CPAP  6. pAFL; currently in sinus    Plan:  - Agree with diuresis; would transition to oral diuretic tomorrow; torsemide 20 mg po daily would be reasonable  - Change metoprolol tartrate to carvedilol 12.5 mg q12h for better BP control  - Continue amlodipine 10 mg daily  - Start spironolactone 25 mg daily; DC standing potassium order  - OK to continue clonidine; would prefer other agents if possible  - Reasonable to hold Eliquis for now, would restart if has recurrent pAF or AFL    Thank you for the opportunity to participate in the care of Martinez Cazares and please do not hesitate to contact us should you have any questions. Signed:  Hunter Hooker.  Clyde Esteban, [address] [facility]  09/28/20

## 2020-09-28 NOTE — PROGRESS NOTES
Transitions of Care: 18% risk of re-admission      -Patient will discharge home with home health PT and skilled nursing when stable   -Family to transport   -Cardiac Surgery follow-up    The CM spoke with the Cardiac Surgery NP, hopeful for discharge tomorrow with home health services. The CM called Annabella, Liaison with [facility] and provided update, aware of likely discharge tomorrow. CM will follow for transitions of care needs. CM met with the patient and his wife- both denied any concerns at this time, aware of anticipated discharge tomorrow 9/29. Family to transport home. Medicare pt has received and reviewed 2nd IM letter informing them of their right to appeal the discharge. Copy has been placed on pt bedside chart.     ADRIANNE Yu

## 2020-09-28 NOTE — PROGRESS NOTES
CSS Progress Note    Admit Date: 2020  POD:  6 Day Post-Op    Procedure:  Procedure(s):  REDO STERNOTOMY, AORTIC VALVE REPLACEMENT WITH 25MM BIOPROSTHETIC HEART VALVE; RIGHT FEMORAL CUTDOWN FOR CANNULATION; CRISTINA AND EPIAORTIC BY DR. Alana Cummings         Subjective:   Pt seen with Dr. Jessica Chino. In SB 45s. Sitting in chair, no complaints. On RA, afebrile. +BM. Weight down 2 lbs      Objective:   Vitals:  Blood pressure (!) 152/56, pulse 64, temperature 97.3 °F (36.3 °C), resp. rate 18, height 6' (1.829 m), weight 311 lb 4.6 oz (141.2 kg), SpO2 98 %. Temp (24hrs), Av.7 °F (36.5 °C), Min:97.3 °F (36.3 °C), Max:98 °F (36.7 °C)    EKG/Rhythm:  SB 40-50s    Oxygen Therapy: RA    CXR:   CXR Results  (Last 48 hours)    None        Admission Weight: Last Weight   Weight: 302 lb 7.5 oz (137.2 kg) Weight: 311 lb 4.6 oz (141.2 kg)     Intake / Output / Drain:  Current Shift:  0701 -  1900  In: 120 [P.O.:120]  Out: 800 [Urine:800]  Last 24 hrs.:     Intake/Output Summary (Last 24 hours) at 2020 0948  Last data filed at 2020 0711  Gross per 24 hour   Intake 660 ml   Output 2975 ml   Net -2315 ml       EXAM:  General:  No acute distress. Up in the chair. Lungs:   Clear to auscultation bilaterally   Incision:  Incision c/d/i   Heart:  Regular rate and rhythm, S1, S2 normal, no murmur, click, rub or gallop. Abdomen:   Soft, non-tender. Bowel sounds active. No masses,  No organomegaly. Extremities: Mod generalized  edema. PPP. Neurologic:  Gross motor and sensory apparatus intact. Labs:   Recent Labs     20  0609 20  0422   WBC  --  7.9   HGB  --  12.5   HCT  --  37.1   PLT  --  156   NA  --  133*   K  --  4.1   BUN  --  54*   CREA  --  2.50*   GLU  --  114*   GLUCPOC 116*  --         Assessment:     Active Problems:     Aortic valve stenosis, severe (2020)      Aortic stenosis (2020)      S/P AVR (aortic valve replacement) and aortoplasty (2020) Overview: REDO STERNOTOMY      AORTIC VALVE REPLACEMENT WITH 25MM BIOPROSTHETIC HEART VALVE      RIGHT FEMORAL artery and vein CUTDOWN FOR CANNULATION         Plan/Recommendations/Medical Decision Makin. Mod to severe bioprosthetic AV stenosis s/p redo AVR: 325 mg ASA. Post-op TTE completed - report pending. 2. CAD S/p CABG : on asa, statin, Dec BB to 25 mg PO BID due to HR. 3. DM2: On insulin 75/25, actos at home. Hgba1c of 8.6. DTS consult. Cont NPH 28 units BID. BS ACHS, change SSI to insulin resistent. May need to resume lispro 10 units w/ meals as BS > 200.      4. Chronic Diastolic CHF: supportive care. 5. HTN: Cont norvasc 10 mg PO daily, on metoprolol 25 PO BID, on lasix 40 mg IV BID today. Cozaar PTA--hold off d/t CKD. BP > elevated, start clonidine 0.1 mg PO BID, PRN Hydralazine    6. HLD: on statin    7. ANDRY: uses cpap    8. CKD3: Creatinine holding at 2.5 today. 40 mg IV lasix BID per Dr. [place]. Avoid hypotension and nephrotoxic agents. Continue to monitor. Weight down 2 lbs    9. BPH: Cont flomax today. 10. A flutter: Resolved <24hrs, hold off on eliquis. Now SB. Cont BB, amio 200 mg PO BID    11. Anemia: Postoperative secondary to acute blood loss. Currently above transfusion threshold. Monitor daily CBC. 12. Atelectasis:  IS and TCDB. Increase activity. Cont diuretics. On RA    13. Thrombocytopenia:resolved,  On 325mg aspirin. On Protonix. 14. Nutrition: Cardiac w/ carb consistent     15. DVT/GI prophylaxis: SCD, PPI    16. Hyponatremia: 133 today. Improving w/ diuresis. Monitor     Dispo: PT/OT/Cardiac Rehab. Working towards home w/ [facility] services-likely Tuesday if kidney fxn remains stable. Still has AV Wires.      Signed By: Hermes Bar NP

## 2020-09-28 NOTE — DIABETES MGMT
OSCAR CROWE  CLINICAL NURSE SPECIALIST CONSULT  [facility]    FOLLOW UP NOTE    Presentation   Nikita Gallegos is a 78 y.o. male who [facility] the [facility] for evaluation of moderate to severe aortic valve stenosis with progressive c/o fatigue, dyspnea, weakness with 20-30 ln weight gain. HX: CAD s/o CABG x2 and AVR (2014), CKD, CHF, HTN, GERD, A flutter, DM2, partial colectomy, ANDRY, BPH, HLD    DX: Severe aortic stenosis    TX: aortic valve repair    Current clinical course has been uncomplicated. Diabetes: Patient has known Type two diabetes, treated with Humalog Mix 75/25 PTA. A1C 8.6%    Diabetes medication history  Drug class Currently in use Discontinued Never used   Biguanide      DDP-4 inhibitor       Sulfonylurea      Thiazolidinedione Actos 15 mg daily     GLP-1 RA      SGLT-2 inhibitors      Basal insulin      Fixed Dose  Combinations Humalog 75/25 50 units at breakfast and dinner     Bolus insulin        Subjective   Transferred to [facility]  Transitioned to basal, correctional insulin  Poor PO intake- bites of meals  Fasting glucose 119   7 units correctional insulin   NPH 28 units BID  Objective   Physical exam  General Alert, oriented and in no acute distress/ill-appearing. Conversant and cooperative. Vital Signs   Visit Vitals  BP (!) 141/56 (BP 1 Location: Right arm, BP Patient Position: At rest)   Pulse (!) 52   Temp 97.5 °F (36.4 °C)   Resp 18   Ht 6' (1.829 m)   Wt 141.2 kg (311 lb 4.6 oz)   SpO2 92%   BMI 42.22 kg/m²     Skin  Warm and dry. No acanthosis noted along neckline. No lipohypertrophy or lipoatrophy noted at injection sites   Heart   Regular rate and rhythm.  No murmurs, rubs or gallops  Lungs  Clear to auscultation without rales or rhonchi  Extremities No foot wounds      Laboratory  Lab Results   Component Value Date/Time    Hemoglobin A1c 8.6 (H) 09/21/2020 09:38 AM    Hemoglobin A1c (POC) 6.0 (A) 01/21/2020 09:07 AM     Lab Results Component Value Date/Time    LDL, calculated 66 07/30/2020 10:46 AM     Lab Results   Component Value Date/Time    Creatinine 2.50 (H) 09/28/2020 04:22 AM     Lab Results   Component Value Date/Time    Sodium 133 (L) 09/28/2020 04:22 AM    Potassium 4.1 09/28/2020 04:22 AM    Chloride 103 09/28/2020 04:22 AM    CO2 23 09/28/2020 04:22 AM    Anion gap 7 09/28/2020 04:22 AM    Glucose 114 (H) 09/28/2020 04:22 AM    BUN 54 (H) 09/28/2020 04:22 AM    Creatinine 2.50 (H) 09/28/2020 04:22 AM    BUN/Creatinine ratio 22 (H) 09/28/2020 04:22 AM    GFR est AA 31 (L) 09/28/2020 04:22 AM    GFR est non-AA 26 (L) 09/28/2020 04:22 AM    Calcium 8.4 (L) 09/28/2020 04:22 AM    Bilirubin, total 0.9 09/28/2020 04:22 AM    Alk. phosphatase 50 09/28/2020 04:22 AM    Protein, total 6.0 (L) 09/28/2020 04:22 AM    Albumin 2.7 (L) 09/28/2020 04:22 AM    Globulin 3.3 09/28/2020 04:22 AM    A-G Ratio 0.8 (L) 09/28/2020 04:22 AM    ALT (SGPT) 13 09/28/2020 04:22 AM     Lab Results   Component Value Date/Time    ALT (SGPT) 13 09/28/2020 04:22 AM       Factors affecting BG pattern  Factor Dose Comments   Nutrition:  Carb-controlled meals   60 grams/meal NPO at MN     Blood glucose pattern        Assessment and Plan   Nursing Diagnosis Risk for unstable blood glucose pattern   Nursing Intervention Domain 8970 Decision-making Support   Nursing Interventions Examined current inpatient diabetes control   Explored factors facilitating and impeding inpatient management  Identified self-management practices impeding diabetes control  Explored corrective strategies with patient and responsible inpatient provider   Informed patient of rational for insulin strategy while hospitalized     Evaluation   Jaspreet Mcknight is a 78year old gentleman with uncontrolled diabetes admitted pre-operatively for insulin adjustment prior to scheduled TAVR for severe aortic stenosis.  A1C on admission was 8.6% in actos and high dose humalog mix and will require insulin adjustments in the post-operative period to target inpatient glucose goal of 100-180. At this time, glucose is controlled on basal and correctional insulin in the post-operative period. Recommendations   Recommend:   1. Continue basal insulin at 28 units NPH BID    -Can increase to home basal rate  (35 units BID) if fasting glucose over 200    2. Continue correctional insulin at resistant sensitivity    3. If eating well, add mealtime Humalog: 10 units scheduled with meals. Hold if patient consumes less than 50% of carbohydrates on meal tray      On Discharge: OK to resume PTA anti-hyperglycemic agents at PTA doses      Billing Code(s)   I personally reviewed chart, notes, data and current medications in the medical record, and examined the patient at bedside before making care recommendations.      [x] [id number]  subsequent hospital care - 15 minutes      RAMIRO Madera  [facility]  Access via [address] [place] [address]  [phone]

## 2020-09-28 NOTE — PROGRESS NOTES
0600: Bedside and Verbal shift change report given to ruth patton (oncoming nurse) by Misa Figueroa rn (offgoing nurse). Report included the following information SBAR, Kardex, Intake/Output, MAR, Recent Results, and Cardiac Rhythm sinus lorene      1930: Bedside and Verbal shift change report given to [facility] rn (oncoming nurse) by Tash Castaneda (offgoing nurse). Report included the following information SBAR, Kardex, Intake/Output, MAR, Recent Results and Cardiac Rhythm nsr, sinus lorene. Problem: Pressure Injury - Risk of  Goal: *Prevention of pressure injury  Description: Document Erich Scale and appropriate interventions in the flowsheet. Outcome: Progressing Towards Goal  Note: Pressure Injury Interventions:  Sensory Interventions: Assess changes in LOC         Activity Interventions: Increase time out of bed, Pressure redistribution bed/mattress(bed type)    Mobility Interventions: Pressure redistribution bed/mattress (bed type)    Nutrition Interventions: Document food/fluid/supplement intake    Friction and Shear Interventions: Minimize layers                Problem: Patient Education: Go to Patient Education Activity  Goal: Patient/Family Education  Outcome: Progressing Towards Goal     Problem: Falls - Risk of  Goal: *Absence of Falls  Description: Document Sahil Fall Risk and appropriate interventions in the flowsheet.   Outcome: Progressing Towards Goal  Note: Fall Risk Interventions:  Mobility Interventions: Patient to call before getting OOB, Communicate number of staff needed for ambulation/transfer         Medication Interventions: Teach patient to arise slowly, Patient to call before getting OOB    Elimination Interventions: Patient to call for help with toileting needs, Call light in reach              Problem: Patient Education: Go to Patient Education Activity  Goal: Patient/Family Education  Outcome: Progressing Towards Goal     Problem: Pain  Goal: *Control of Pain  Outcome: Progressing Towards Goal Problem: Patient Education: Go to Patient Education Activity  Goal: Patient/Family Education  Outcome: Progressing Towards Goal     Problem: Diabetes Self-Management  Goal: *Disease process and treatment process  Description: Define diabetes and identify own type of diabetes; list 3 options for treating diabetes. Outcome: Progressing Towards Goal  Goal: *Incorporating nutritional management into lifestyle  Description: Describe effect of type, amount and timing of food on blood glucose; list 3 methods for planning meals. Outcome: Progressing Towards Goal  Goal: *Incorporating physical activity into lifestyle  Description: State effect of exercise on blood glucose levels. Outcome: Progressing Towards Goal  Goal: *Developing strategies to promote health/change behavior  Description: Define the ABC's of diabetes; identify appropriate screenings, schedule and personal plan for screenings. Outcome: Progressing Towards Goal  Goal: *Using medications safely  Description: State effect of diabetes medications on diabetes; name diabetes medication taking, action and side effects. Outcome: Progressing Towards Goal  Goal: *Monitoring blood glucose, interpreting and using results  Description: Identify recommended blood glucose targets  and personal targets. Outcome: Progressing Towards Goal  Goal: *Prevention, detection, treatment of acute complications  Description: List symptoms of hyper- and hypoglycemia; describe how to treat low blood sugar and actions for lowering  high blood glucose level. Outcome: Progressing Towards Goal  Goal: *Prevention, detection and treatment of chronic complications  Description: Define the natural course of diabetes and describe the relationship of blood glucose levels to long term complications of diabetes.   Outcome: Progressing Towards Goal  Goal: *Developing strategies to address psychosocial issues  Description: Describe feelings about living with diabetes; identify support needed and support network  Outcome: Progressing Towards Goal  Goal: *Insulin pump training  Outcome: Progressing Towards Goal  Goal: *Sick day guidelines  Outcome: Progressing Towards Goal  Goal: *Patient Specific Goal (EDIT GOAL, INSERT TEXT)  Outcome: Progressing Towards Goal     Problem: Patient Education: Go to Patient Education Activity  Goal: Patient/Family Education  Outcome: Progressing Towards Goal     Problem: Patient Education: Go to Patient Education Activity  Goal: Patient/Family Education  Outcome: Progressing Towards Goal     Problem: Cardiac Valve Surgery: Post-Op Day 6  Goal: Activity/Safety  Outcome: Progressing Towards Goal  Goal: Diagnostic Test/Procedures  Outcome: Progressing Towards Goal  Goal: Nutrition/Diet  Outcome: Progressing Towards Goal  Goal: Discharge Planning  Outcome: Progressing Towards Goal  Goal: Medications  Outcome: Progressing Towards Goal  Goal: Respiratory  Outcome: Progressing Towards Goal  Goal: Treatments/Interventions/Procedures  Outcome: Progressing Towards Goal  Goal: Psychosocial  Outcome: Progressing Towards Goal     Problem: Cardiac Valve Surgery: Discharge Outcomes  Goal: *Stable cardiac rhythm  Outcome: Progressing Towards Goal  Note: Sinus lorene  Goal: *Lungs clear or at baseline  Outcome: Progressing Towards Goal

## 2020-09-29 VITALS
RESPIRATION RATE: 18 BRPM | HEART RATE: 50 BPM | OXYGEN SATURATION: 96 % | DIASTOLIC BLOOD PRESSURE: 54 MMHG | WEIGHT: 306 LBS | TEMPERATURE: 97.7 F | BODY MASS INDEX: 41.45 KG/M2 | HEIGHT: 72 IN | SYSTOLIC BLOOD PRESSURE: 113 MMHG

## 2020-09-29 LAB
ALBUMIN SERPL-MCNC: 2.9 G/DL (ref 3.5–5)
ALBUMIN/GLOB SERPL: 0.9 {RATIO} (ref 1.1–2.2)
ALP SERPL-CCNC: 52 U/L (ref 45–117)
ALT SERPL-CCNC: 16 U/L (ref 12–78)
ANION GAP SERPL CALC-SCNC: 11 MMOL/L (ref 5–15)
AST SERPL-CCNC: 15 U/L (ref 15–37)
ATRIAL RATE: 52 BPM
BILIRUB SERPL-MCNC: 0.8 MG/DL (ref 0.2–1)
BUN SERPL-MCNC: 54 MG/DL (ref 6–20)
BUN/CREAT SERPL: 21 (ref 12–20)
CALCIUM SERPL-MCNC: 8.6 MG/DL (ref 8.5–10.1)
CALCULATED P AXIS, ECG09: 94 DEGREES
CALCULATED R AXIS, ECG10: -33 DEGREES
CALCULATED T AXIS, ECG11: 76 DEGREES
CHLORIDE SERPL-SCNC: 103 MMOL/L (ref 97–108)
CO2 SERPL-SCNC: 23 MMOL/L (ref 21–32)
CREAT SERPL-MCNC: 2.62 MG/DL (ref 0.7–1.3)
DIAGNOSIS, 93000: NORMAL
ERYTHROCYTE [DISTWIDTH] IN BLOOD BY AUTOMATED COUNT: 14 % (ref 11.5–14.5)
GLOBULIN SER CALC-MCNC: 3.3 G/DL (ref 2–4)
GLUCOSE BLD STRIP.AUTO-MCNC: 129 MG/DL (ref 65–100)
GLUCOSE BLD STRIP.AUTO-MCNC: 76 MG/DL (ref 65–100)
GLUCOSE SERPL-MCNC: 47 MG/DL (ref 65–100)
HCT VFR BLD AUTO: 37.5 % (ref 36.6–50.3)
HGB BLD-MCNC: 12.5 G/DL (ref 12.1–17)
MAGNESIUM SERPL-MCNC: 2.7 MG/DL (ref 1.6–2.4)
MCH RBC QN AUTO: 30.3 PG (ref 26–34)
MCHC RBC AUTO-ENTMCNC: 33.3 G/DL (ref 30–36.5)
MCV RBC AUTO: 90.8 FL (ref 80–99)
NRBC # BLD: 0 K/UL (ref 0–0.01)
NRBC BLD-RTO: 0 PER 100 WBC
P-R INTERVAL, ECG05: 244 MS
PLATELET # BLD AUTO: 189 K/UL (ref 150–400)
PMV BLD AUTO: 10.1 FL (ref 8.9–12.9)
POTASSIUM SERPL-SCNC: 3.8 MMOL/L (ref 3.5–5.1)
PROT SERPL-MCNC: 6.2 G/DL (ref 6.4–8.2)
Q-T INTERVAL, ECG07: 486 MS
QRS DURATION, ECG06: 136 MS
QTC CALCULATION (BEZET), ECG08: 451 MS
RBC # BLD AUTO: 4.13 M/UL (ref 4.1–5.7)
SERVICE CMNT-IMP: ABNORMAL
SERVICE CMNT-IMP: NORMAL
SODIUM SERPL-SCNC: 137 MMOL/L (ref 136–145)
VENTRICULAR RATE, ECG03: 52 BPM
WBC # BLD AUTO: 8 K/UL (ref 4.1–11.1)

## 2020-09-29 PROCEDURE — 82962 GLUCOSE BLOOD TEST: CPT

## 2020-09-29 PROCEDURE — 36415 COLL VENOUS BLD VENIPUNCTURE: CPT

## 2020-09-29 PROCEDURE — 74011250637 HC RX REV CODE- 250/637: Performed by: NURSE PRACTITIONER

## 2020-09-29 PROCEDURE — 99231 SBSQ HOSP IP/OBS SF/LOW 25: CPT | Performed by: CLINICAL NURSE SPECIALIST

## 2020-09-29 PROCEDURE — 85027 COMPLETE CBC AUTOMATED: CPT

## 2020-09-29 PROCEDURE — 74011250637 HC RX REV CODE- 250/637: Performed by: INTERNAL MEDICINE

## 2020-09-29 PROCEDURE — 80053 COMPREHEN METABOLIC PANEL: CPT

## 2020-09-29 PROCEDURE — 74011250637 HC RX REV CODE- 250/637: Performed by: PHYSICIAN ASSISTANT

## 2020-09-29 PROCEDURE — 83735 ASSAY OF MAGNESIUM: CPT

## 2020-09-29 PROCEDURE — 74011636637 HC RX REV CODE- 636/637: Performed by: NURSE PRACTITIONER

## 2020-09-29 PROCEDURE — 97535 SELF CARE MNGMENT TRAINING: CPT

## 2020-09-29 RX ORDER — AMIODARONE HYDROCHLORIDE 200 MG/1
TABLET ORAL
Qty: 42 TAB | Refills: 0 | Status: SHIPPED | OUTPATIENT
Start: 2020-09-29 | End: 2020-10-14 | Stop reason: SINTOL

## 2020-09-29 RX ORDER — AMOXICILLIN 250 MG
1 CAPSULE ORAL 2 TIMES DAILY
Qty: 30 TAB | Refills: 0 | Status: SHIPPED | OUTPATIENT
Start: 2020-09-29 | End: 2020-10-21

## 2020-09-29 RX ORDER — TORSEMIDE 20 MG/1
20 TABLET ORAL DAILY
Status: DISCONTINUED | OUTPATIENT
Start: 2020-09-29 | End: 2020-09-29 | Stop reason: HOSPADM

## 2020-09-29 RX ORDER — CARVEDILOL 12.5 MG/1
12.5 TABLET ORAL 2 TIMES DAILY WITH MEALS
Qty: 60 TAB | Refills: 1 | Status: SHIPPED | OUTPATIENT
Start: 2020-09-29 | End: 2020-10-14 | Stop reason: SDUPTHER

## 2020-09-29 RX ORDER — TORSEMIDE 20 MG/1
20 TABLET ORAL DAILY
Qty: 30 TAB | Refills: 0 | Status: SHIPPED | OUTPATIENT
Start: 2020-09-30 | End: 2020-10-20

## 2020-09-29 RX ORDER — SPIRONOLACTONE 25 MG/1
25 TABLET ORAL DAILY
Qty: 30 TAB | Refills: 1 | Status: SHIPPED | OUTPATIENT
Start: 2020-09-30 | End: 2020-10-19 | Stop reason: SDUPTHER

## 2020-09-29 RX ORDER — CLONIDINE HYDROCHLORIDE 0.1 MG/1
0.1 TABLET ORAL 2 TIMES DAILY
Qty: 30 TAB | Refills: 0 | Status: SHIPPED | OUTPATIENT
Start: 2020-09-29 | End: 2020-10-19 | Stop reason: SDUPTHER

## 2020-09-29 RX ORDER — ASPIRIN 325 MG
325 TABLET ORAL DAILY
Qty: 90 TAB | Refills: 1 | Status: SHIPPED | OUTPATIENT
Start: 2020-09-30 | End: 2021-01-13

## 2020-09-29 RX ORDER — TAMSULOSIN HYDROCHLORIDE 0.4 MG/1
0.4 CAPSULE ORAL DAILY
Qty: 30 CAP | Refills: 0 | Status: SHIPPED | OUTPATIENT
Start: 2020-09-30 | End: 2020-10-19 | Stop reason: SDUPTHER

## 2020-09-29 RX ORDER — OXYCODONE HYDROCHLORIDE 5 MG/1
5 TABLET ORAL
Qty: 28 TAB | Refills: 0 | Status: SHIPPED | OUTPATIENT
Start: 2020-09-29 | End: 2020-10-06

## 2020-09-29 RX ORDER — AMLODIPINE BESYLATE 5 MG/1
10 TABLET ORAL DAILY
Qty: 60 TAB | Refills: 1 | Status: SHIPPED
Start: 2020-09-29 | End: 2020-10-21

## 2020-09-29 RX ADMIN — SPIRONOLACTONE 25 MG: 25 TABLET ORAL at 08:21

## 2020-09-29 RX ADMIN — TAMSULOSIN HYDROCHLORIDE 0.4 MG: 0.4 CAPSULE ORAL at 08:21

## 2020-09-29 RX ADMIN — TORSEMIDE 20 MG: 20 TABLET ORAL at 08:21

## 2020-09-29 RX ADMIN — HUMAN INSULIN 28 UNITS: 100 INJECTION, SUSPENSION SUBCUTANEOUS at 08:22

## 2020-09-29 RX ADMIN — ASPIRIN 325 MG ORAL TABLET 325 MG: 325 PILL ORAL at 08:21

## 2020-09-29 RX ADMIN — ATORVASTATIN CALCIUM 40 MG: 40 TABLET, FILM COATED ORAL at 08:21

## 2020-09-29 RX ADMIN — AMIODARONE HYDROCHLORIDE 200 MG: 200 TABLET ORAL at 08:22

## 2020-09-29 RX ADMIN — AMLODIPINE BESYLATE 10 MG: 5 TABLET ORAL at 08:22

## 2020-09-29 RX ADMIN — DOCUSATE SODIUM 50MG AND SENNOSIDES 8.6MG 1 TABLET: 8.6; 5 TABLET, FILM COATED ORAL at 08:21

## 2020-09-29 RX ADMIN — CLONIDINE HYDROCHLORIDE 0.1 MG: 0.1 TABLET ORAL at 08:21

## 2020-09-29 RX ADMIN — CARVEDILOL 12.5 MG: 12.5 TABLET, FILM COATED ORAL at 08:21

## 2020-09-29 RX ADMIN — ACETAMINOPHEN 650 MG: 325 TABLET ORAL at 08:22

## 2020-09-29 RX ADMIN — Medication 20 ML: at 07:05

## 2020-09-29 RX ADMIN — CHLORHEXIDINE GLUCONATE 10 ML: 1.2 RINSE ORAL at 08:22

## 2020-09-29 RX ADMIN — PANTOPRAZOLE SODIUM 40 MG: 40 TABLET, DELAYED RELEASE ORAL at 07:04

## 2020-09-29 RX ADMIN — POLYETHYLENE GLYCOL 3350 17 G: 17 POWDER, FOR SOLUTION ORAL at 08:22

## 2020-09-29 NOTE — PROGRESS NOTES
0730: Bedside and Verbal shift change report given to ruth patton (oncoming nurse) by Carissa Vaz (offgoing nurse). Report included the following information SBAR, Kardex, Intake/Output, MAR, Recent Results, and Cardiac Rhythm nsr, sinus lorene . 1204: Discharge medications reviewed with patient and appropriate educational materials and side effects teaching were provided. I have reviewed discharge instructions with the patient. The patient verbalized understanding. Medication changes, follow up appnts, and post cardiac surgery instructions reviewed with the patient. Opportunity for questions and clarification was provided. Pt required to stay on cardiac monitoring 4 hours post wire removal, can leave at 1430      Problem: Pressure Injury - Risk of  Goal: *Prevention of pressure injury  Description: Document Erich Scale and appropriate interventions in the flowsheet. Outcome: Progressing Towards Goal  Note: Pressure Injury Interventions:  Sensory Interventions: Keep linens dry and wrinkle-free         Activity Interventions: Increase time out of bed, Pressure redistribution bed/mattress(bed type)    Mobility Interventions: Pressure redistribution bed/mattress (bed type)    Nutrition Interventions: Document food/fluid/supplement intake    Friction and Shear Interventions: Minimize layers                Problem: Patient Education: Go to Patient Education Activity  Goal: Patient/Family Education  Outcome: Progressing Towards Goal     Problem: Falls - Risk of  Goal: *Absence of Falls  Description: Document Sahil Fall Risk and appropriate interventions in the flowsheet.   Outcome: Progressing Towards Goal  Note: Fall Risk Interventions:  Mobility Interventions: Patient to call before getting OOB, Communicate number of staff needed for ambulation/transfer         Medication Interventions: Teach patient to arise slowly, Patient to call before getting OOB    Elimination Interventions: Patient to call for help with toileting needs, Call light in reach              Problem: Patient Education: Go to Patient Education Activity  Goal: Patient/Family Education  Outcome: Progressing Towards Goal     Problem: Pain  Goal: *Control of Pain  Outcome: Progressing Towards Goal     Problem: Patient Education: Go to Patient Education Activity  Goal: Patient/Family Education  Outcome: Progressing Towards Goal     Problem: Diabetes Self-Management  Goal: *Disease process and treatment process  Description: Define diabetes and identify own type of diabetes; list 3 options for treating diabetes. Outcome: Progressing Towards Goal  Goal: *Incorporating nutritional management into lifestyle  Description: Describe effect of type, amount and timing of food on blood glucose; list 3 methods for planning meals. Outcome: Progressing Towards Goal  Goal: *Incorporating physical activity into lifestyle  Description: State effect of exercise on blood glucose levels. Outcome: Progressing Towards Goal  Goal: *Developing strategies to promote health/change behavior  Description: Define the ABC's of diabetes; identify appropriate screenings, schedule and personal plan for screenings. Outcome: Progressing Towards Goal  Goal: *Using medications safely  Description: State effect of diabetes medications on diabetes; name diabetes medication taking, action and side effects. Outcome: Progressing Towards Goal  Goal: *Monitoring blood glucose, interpreting and using results  Description: Identify recommended blood glucose targets  and personal targets. Outcome: Progressing Towards Goal  Goal: *Prevention, detection, treatment of acute complications  Description: List symptoms of hyper- and hypoglycemia; describe how to treat low blood sugar and actions for lowering  high blood glucose level.   Outcome: Progressing Towards Goal  Goal: *Prevention, detection and treatment of chronic complications  Description: Define the natural course of diabetes and describe the relationship of blood glucose levels to long term complications of diabetes.   Outcome: Progressing Towards Goal  Goal: *Developing strategies to address psychosocial issues  Description: Describe feelings about living with diabetes; identify support needed and support network  Outcome: Progressing Towards Goal  Goal: *Insulin pump training  Outcome: Progressing Towards Goal  Goal: *Sick day guidelines  Outcome: Progressing Towards Goal  Goal: *Patient Specific Goal (EDIT GOAL, INSERT TEXT)  Outcome: Progressing Towards Goal     Problem: Patient Education: Go to Patient Education Activity  Goal: Patient/Family Education  Outcome: Progressing Towards Goal     Problem: Patient Education: Go to Patient Education Activity  Goal: Patient/Family Education  Outcome: Progressing Towards Goal     Problem: Cardiac Valve Surgery: Post-Op Day 6  Goal: Activity/Safety  Outcome: Progressing Towards Goal  Goal: Diagnostic Test/Procedures  Outcome: Progressing Towards Goal  Goal: Nutrition/Diet  Outcome: Progressing Towards Goal  Goal: Discharge Planning  Outcome: Progressing Towards Goal  Goal: Medications  Outcome: Progressing Towards Goal  Goal: Respiratory  Outcome: Progressing Towards Goal  Goal: Treatments/Interventions/Procedures  Outcome: Progressing Towards Goal  Goal: Psychosocial  Outcome: Progressing Towards Goal     Problem: Cardiac Valve Surgery: Discharge Outcomes  Goal: *Hemodynamically stable  Outcome: Progressing Towards Goal  Goal: *Stable cardiac rhythm  Outcome: Progressing Towards Goal  Goal: *Lungs clear or at baseline  Outcome: Progressing Towards Goal  Goal: *Demonstrates ability to perform prescribed activity without shortness of breath or discomfort  Outcome: Progressing Towards Goal  Goal: *Verbalizes home exercise program, activity guidelines, cardiac precautions  Outcome: Progressing Towards Goal  Goal: *Optimal pain control at patient's stated goal  Outcome: Progressing Towards Goal  Goal: *Verbalizes understanding and describes prescribed diet  Outcome: Progressing Towards Goal  Goal: *Verbalizes name, dosage, time, side effects, and number of days to continue medications  Outcome: Progressing Towards Goal  Goal: *Weight  is stable  Outcome: Progressing Towards Goal  Goal: *No signs and symptoms of infection or wound complications  Outcome: Progressing Towards Goal  Goal: *Anxiety reduced or absent  Outcome: Progressing Towards Goal  Goal: *Verbalizes and demonstrates incision care  Outcome: Progressing Towards Goal  Goal: *Understands and describes signs and symptoms to report to providers(Stroke Metric)  Outcome: Progressing Towards Goal  Goal: *Describes follow-up/return visits to physicians  Outcome: Progressing Towards Goal  Goal: *Describes available resources and support systems  Outcome: Progressing Towards Goal  Goal: *Expresses feelings about diagnosis and surgery  Outcome: Progressing Towards Goal  Goal: *Influenza immunization  Outcome: Progressing Towards Goal  Goal: *Pneumococcal immunization  Outcome: Progressing Towards Goal

## 2020-09-29 NOTE — PROGRESS NOTES
Problem: Self Care Deficits Care Plan (Adult)  Goal: *Acute Goals and Plan of Care (Insert Text)  Description: FUNCTIONAL STATUS PRIOR TO ADMISSION: Patient was independent and active without use of DME.     HOME SUPPORT: The patient lived with wife but did not require assist.    Occupational Therapy Goals  Initiated 9/23/2020  1. Patient will perform ADLs standing 5 mins without fatigue or LOB with supervision/set-up within 5 day(s). 2.  Patient will perform lower body ADLs with supervision/set-up within 5 day(s). 3.  Patient will perform gathering ADL items high and low 2/2 with supervision/set-up within 5 day(s). 4.  Patient will perform toilet transfers with supervision/set-up within 5 day(s). 5.  Patient will perform all aspects of toileting with supervision/set-up within 5 day(s). 6.  Patient will participate in cardiac/sternal upper extremity therapeutic exercise/activities to increase independence with ADLs with supervision/set-up for 5 minutes within 5 day(s). Outcome: Progressing Towards Goal     OCCUPATIONAL THERAPY TREATMENT/DISCHARGE  Patient: Kary Mckinney (7 y.o. male)  Date: 9/29/2020  Diagnosis: Aortic stenosis [I35.0]   <principal problem not specified>  Procedure(s) (LRB):  REDO STERNOTOMY, AORTIC VALVE REPLACEMENT WITH 25MM BIOPROSTHETIC HEART VALVE; RIGHT FEMORAL CUTDOWN FOR CANNULATION; CRISTINA AND EPIAORTIC BY DR. Amanda Brennan  (N/A) 7 Days Post-Op  Precautions: Fall  Chart, occupational therapy assessment, plan of care, and goals were reviewed. ASSESSMENT  Patient continues with skilled OT services and has progressed towards goals. Pt received sitting in chair with shorts on, anticipating discharge this afternoon. Provided pt and wife with handout and verbal education on sternal precautions and self-care techniques. Pt demonstrated good understanding of \"move in the tube\" while completed UB dressing task.  No skilled occupational therapy/ follow up rehabilitation needs identified at this time. Current Level of Function (ADLs/self-care): Set up for dressing     Other factors to consider for discharge: Supportive wife          PLAN :  Rationale for discharge: Goals achieved  Recommend with staff: Leo Wallace for meals and toileting   Recommendation for discharge: (in order for the patient to meet his/her long term goals)  No skilled occupational therapy/ follow up rehabilitation needs identified at this time. This discharge recommendation:  Has been made in collaboration with the attending provider and/or case management    IF patient discharges home will need the following DME: none. SUBJECTIVE:   Patient stated I know about 'move in the tube'.     OBJECTIVE DATA SUMMARY:   Cognitive/Behavioral Status:  Neurologic State: Alert  Orientation Level: Oriented X4  Cognition: Appropriate for age attention/concentration             Balance:  Sitting: Intact  Sitting - Static: Good (unsupported)  Sitting - Dynamic: Good (unsupported)    ADL Intervention:     Pt completed UB dressing task, demonstrating good understanding of \"move in a tube\" precautions. Pt able to don/doff sock independently via tailor sit. Educated pt verbally and through handout on self care for dressing, toileting, and bathing. Pt indicated understanding and did not have any further questions. Upper Body Dressing Assistance  Dressing Assistance: Set-up  Front Opened Shirt: Set-up    Lower Body Dressing Assistance  Socks: Independent                Pain:  None reported     Activity Tolerance:   Good  Please refer to the flowsheet for vital signs taken during this treatment. After treatment patient left in no apparent distress:   Sitting in chair and Caregiver / family present    COMMUNICATION/COLLABORATION:   The patients plan of care was discussed with: Physical therapist and Registered nurse. Yudith Lee  Time Calculation: 31 mins      Regarding student involvement in patient care:   KRISTIN student participated in this treatment session. Per CMS Medicare statements and AOTA guidelines I certify that the following was true:  1. I was present and directly observed the entire session. 2. I made all skilled judgments and clinical decisions regarding care. 3. I am the practitioner responsible for assessment, treatment, and documentation.

## 2020-09-29 NOTE — PROGRESS NOTES
CSS Progress Note    Admit Date: 2020  POD:  7 Day Post-Op    Procedure:  Procedure(s):  REDO STERNOTOMY, AORTIC VALVE REPLACEMENT WITH 25MM BIOPROSTHETIC HEART VALVE; RIGHT FEMORAL CUTDOWN FOR CANNULATION; CRISTINA AND EPIAORTIC BY DR. Coyle Cart         Subjective:   Pt seen with Dr. Alphonso Chaudhary. In SB 45s. Sitting in chair, no complaints. On RA, afebrile. +BM. Weight down 2 lbs      Objective:   Vitals:  Blood pressure (!) 186/67, pulse 63, temperature 97.5 °F (36.4 °C), resp. rate 18, height 6' (1.829 m), weight 306 lb (138.8 kg), SpO2 95 %. Temp (24hrs), Av.8 °F (36.6 °C), Min:97.5 °F (36.4 °C), Max:98.3 °F (36.8 °C)    EKG/Rhythm:  SB 40-50s    Oxygen Therapy: RA    CXR:   CXR Results  (Last 48 hours)    None        Admission Weight: Last Weight   Weight: 302 lb 7.5 oz (137.2 kg) Weight: 306 lb (138.8 kg)     Intake / Output / Drain:  Current Shift:  0701 -  1900  In: 240 [P.O.:240]  Out: 1025 [Urine:1025]  Last 24 hrs.:     Intake/Output Summary (Last 24 hours) at 2020 1007  Last data filed at 2020 0740  Gross per 24 hour   Intake 900 ml   Output 2425 ml   Net -1525 ml       EXAM:  General:  No acute distress. Up in the chair. Lungs:   Clear to auscultation bilaterally   Incision:  Incision c/d/i   Heart:  Regular rate and rhythm, S1, S2 normal, no murmur, click, rub or gallop. Abdomen:   Soft, non-tender. Bowel sounds active. No masses,  No organomegaly. Extremities: Mod generalized  edema. PPP. Neurologic:  Gross motor and sensory apparatus intact. Labs:   Recent Labs     20  0621 20  0331   WBC  --  8.0   HGB  --  12.5   HCT  --  37.5   PLT  --  189   NA  --  137   K  --  3.8   BUN  --  54*   CREA  --  2.62*   GLU  --  47*   GLUCPOC 76  --         Assessment:     Active Problems:     Aortic valve stenosis, severe (2020)      Aortic stenosis (2020)      S/P AVR (aortic valve replacement) and aortoplasty (2020)      Overview: REDO STERNOTOMY      AORTIC VALVE REPLACEMENT WITH 25MM BIOPROSTHETIC HEART VALVE      RIGHT FEMORAL artery and vein CUTDOWN FOR CANNULATION         Plan/Recommendations/Medical Decision Makin. Mod to severe bioprosthetic AV stenosis s/p redo AVR: 325 mg ASA. Post-op TTE completed .     2. CAD S/p CABG : on asa, statin,  Metoprolol changed to coreg by Dr. Lopez Dickson. 3. DM2: On insulin 75/25, actos at home. Hgba1c of 8.6. DTS consult. Cont NPH 28 units BID. BS ACHS, change SSI to insulin resistent. May need to resume lispro 10 units w/ meals as BS > 200.      4. Chronic Diastolic CHF: Now on coreg, added aldactone. Change IV diuretics to PO torsemide 20 mg daily. 5. HTN: Cont norvasc 10 mg PO daily, on metoprolol 25 PO BID, on lasix 40 mg IV BID today. Cozaar PTA--hold off d/t CKD. BP > elevated, start clonidine 0.1 mg PO BID, PRN Hydralazine    6. HLD: on statin    7. ANDRY: uses cpap    8. CKD3: Creatinine 2.6 today. Avoid hypotension and nephrotoxic agents. Continue to monitor. Weight down 2 lbs -- change to torsemide 20 mg PO daily. 9. BPH: Cont flomax today. 10. A flutter: Resolved <24hrs, hold off on eliquis. Now SB. Cont BB, amio 200 mg PO BID    11. Anemia: Postoperative secondary to acute blood loss. Currently above transfusion threshold. Monitor daily CBC. 12. Atelectasis:  IS and TCDB. Increase activity. Cont diuretics. On RA    13. Thrombocytopenia:resolved,  On 325mg aspirin. On Protonix. 14. Nutrition: Cardiac w/ carb consistent     15. DVT/GI prophylaxis: SCD, PPI    16. Hyponatremia: 137 today. Improving w/ diuresis. Monitor     Dispo: PT/OT/Cardiac Rehab. Working towards home w/ [facility] services today. Needs labs to monitor kidney fxn by [facility]. D/c AV wires.      Signed By: Neeraj Shepherd NP

## 2020-09-29 NOTE — DIABETES MGMT
OSCAR [facility]OW[facility]  CLINICAL NURSE SPECIALIST CONSULT  PROGRAM FOR DIABETES HEALTH    FOLLOW UP NOTE    Presentation   Mable Pickens is a 78 y.o. male who presented to the [facility] cardiac [facility] for evaluation of moderate to severe aortic valve stenosis with progressive c/o fatigue, dyspnea, weakness with 20-30 ln weight gain. HX: CAD s/o CABG x2 and AVR (2014), CKD, CHF, HTN, GERD, A flutter, DM2, partial colectomy, ANDRY, BPH, HLD    DX: Severe aortic stenosis    TX: aortic valve repair    Current clinical course has been uncomplicated. Diabetes: Patient has known Type two diabetes, treated with Humalog Mix 75/25 PTA. A1C 8.6%    Diabetes medication history  Drug class Currently in use Discontinued Never used   Biguanide      DDP-4 inhibitor       Sulfonylurea      Thiazolidinedione Actos 15 mg daily     GLP-1 RA      SGLT-2 inhibitors      Basal insulin      Fixed Dose  Combinations Humalog 75/25 50 units at breakfast and dinner     Bolus insulin        Subjective   Transferred to [facility]  Transitioned to basal, correctional insulin  Poor PO intake- bites of meals  Fasting glucose 119   7 units correctional insulin   NPH 28 units BID  Objective   Physical exam  General Alert, oriented and in no acute distress/ill-appearing. Conversant and cooperative. Vital Signs   Visit Vitals  BP (!) 113/54 (BP 1 Location: Left arm, BP Patient Position: At rest)   Pulse (!) 50   Temp 97.7 °F (36.5 °C)   Resp 18   Ht 6' (1.829 m)   Wt 138.8 kg (306 lb)   SpO2 96%   BMI 41.50 kg/m²     Skin  Warm and dry. No acanthosis noted along neckline. No lipohypertrophy or lipoatrophy noted at injection sites   Heart   Regular rate and rhythm.  No murmurs, rubs or gallops  Lungs  Clear to auscultation without rales or rhonchi  Extremities No foot wounds      Laboratory  Lab Results   Component Value Date/Time    Hemoglobin A1c 8.6 (H) 09/21/2020 09:38 AM    Hemoglobin A1c (POC) 6.0 (A) 01/21/2020 09:07 AM     Lab Results Component Value Date/Time    LDL, calculated 66 07/30/2020 10:46 AM     Lab Results   Component Value Date/Time    Creatinine 2.62 (H) 09/29/2020 03:31 AM     Lab Results   Component Value Date/Time    Sodium 137 09/29/2020 03:31 AM    Potassium 3.8 09/29/2020 03:31 AM    Chloride 103 09/29/2020 03:31 AM    CO2 23 09/29/2020 03:31 AM    Anion gap 11 09/29/2020 03:31 AM    Glucose 47 (LL) 09/29/2020 03:31 AM    BUN 54 (H) 09/29/2020 03:31 AM    Creatinine 2.62 (H) 09/29/2020 03:31 AM    BUN/Creatinine ratio 21 (H) 09/29/2020 03:31 AM    GFR est AA 30 (L) 09/29/2020 03:31 AM    GFR est non-AA 24 (L) 09/29/2020 03:31 AM    Calcium 8.6 09/29/2020 03:31 AM    Bilirubin, total 0.8 09/29/2020 03:31 AM    Alk. phosphatase 52 09/29/2020 03:31 AM    Protein, total 6.2 (L) 09/29/2020 03:31 AM    Albumin 2.9 (L) 09/29/2020 03:31 AM    Globulin 3.3 09/29/2020 03:31 AM    A-G Ratio 0.9 (L) 09/29/2020 03:31 AM    ALT (SGPT) 16 09/29/2020 03:31 AM     Lab Results   Component Value Date/Time    ALT (SGPT) 16 09/29/2020 03:31 AM       Factors affecting BG pattern  Factor Dose Comments   Nutrition:  Carb-controlled meals   60 grams/meal NPO at MN     Blood glucose pattern        Assessment and Plan   Nursing Diagnosis Risk for unstable blood glucose pattern   Nursing Intervention Domain 6016 Decision-making Support   Nursing Interventions Examined current inpatient diabetes control   Explored factors facilitating and impeding inpatient management  Identified self-management practices impeding diabetes control  Explored corrective strategies with patient and responsible inpatient provider   Informed patient of rational for insulin strategy while hospitalized     Evaluation   Mariah Child is a 78year old gentleman with uncontrolled diabetes admitted pre-operatively for insulin adjustment prior to scheduled TAVR for severe aortic stenosis.  A1C on admission was 8.6% in actos and high dose humalog mix and will require insulin adjustments in the post-operative period to target inpatient glucose goal of 100-180. At this time, glucose is largely controlled on basal and correctional insulin in the post-operative period. Recommendations   Recommend:   1. Reduce basal insulin to 0.3 units/k units NPH BID     2. Continue correctional insulin at resistant sensitivity    3. If eating well, add mealtime Humalog: 10 units scheduled with meals. Hold if patient consumes less than 50% of carbohydrates on meal tray      On Discharge: Reduce 70/30 insulin to 30 units BID (giving him 21 units NPH BID and 9 units for meal coverage). Patient to continue to obtain glucose before meals and bedtime. Billing Code(s)   I personally reviewed chart, notes, data and current medications in the medical record, and examined the patient at bedside before making care recommendations.      [x] [id number] [facility] hospital care - 15 minutes      RAMIRO Menjivar  [facility] for Diabetes [facility]  Access via [address] [place] [address]  [phone]

## 2020-09-29 NOTE — PROGRESS NOTES
0730: Bedside and Verbal shift change report given to [employer]  (oncoming nurse) by Zahida Granados  (offgoing nurse). Report included the following information SBAR, Kardex, OR Summary, Recent Results and Cardiac Rhythm NSR. Critical Result Notification    Received and verbally repeated the following test results Glucose, 47  from [facility]  (NAME OF TECHNICIAN) on 9/29/2020 (DATE), at 0630 (TIME). POC checked for BG 76, WDL    Additional comments: Maria Fernanda Benitez RN      Problem: Pressure Injury - Risk of  Goal: *Prevention of pressure injury  Description: Document Erich Scale and appropriate interventions in the flowsheet. Outcome: Progressing Towards Goal  Note: Pressure Injury Interventions:  Sensory Interventions: Assess changes in LOC    Activity Interventions: Increase time out of bed, Pressure redistribution bed/mattress(bed type)    Mobility Interventions: Pressure redistribution bed/mattress (bed type), PT/OT evaluation    Nutrition Interventions: Document food/fluid/supplement intake    Friction and Shear Interventions: Minimize layers          Problem: Falls - Risk of  Goal: *Absence of Falls  Description: Document Sahil Fall Risk and appropriate interventions in the flowsheet. Outcome: Progressing Towards Goal  Note: Fall Risk Interventions:  Mobility Interventions: Patient to call before getting OOB, PT Consult for mobility concerns     Medication Interventions: Patient to call before getting OOB, Teach patient to arise slowly    Elimination Interventions: Call light in reach, Patient to call for help with toileting needs, Urinal in reach, Stay With Me (per policy)     Call bell and personal effects within reach. Bed locked and in low position. Non skid footwear in place.        Problem: Cardiac Valve Surgery: Post-Op Day 6  Goal: Diagnostic Test/Procedures  Outcome: Progressing Towards Goal  Goal: Medications  Outcome: Progressing Towards Goal  Goal: Respiratory  Outcome: Progressing Towards Goal  Goal: Psychosocial  Outcome: Progressing Towards Goal

## 2020-09-29 NOTE — PROGRESS NOTES
Transitions of Care: 18% risk of re-admission      -Patient will discharge home with [facility] PT and skilled nursing   -Family to transport   -Cardiac Surgery follow-up    The CM spoke with the Cardiac Surgery NP, patient will be discharging home today. CM called Annabella, Liaison with [facility], and notified her of discharge today. The patient has already received 2nd IM letter in compliance with CMS guidelines. The CM will follow for transitions of care needs, family to transport. ADRIANNE Malone     10:30 a.m.- CM called and notified Aime Solorzano with [facility] that patient will need lab drawl, aware of orders.  ADRIANNE Malone

## 2020-09-29 NOTE — PROGRESS NOTES
Problem: Pressure Injury - Risk of  Goal: *Prevention of pressure injury  Description: Document Erich Scale and appropriate interventions in the flowsheet. 9/29/2020 1033 by Roxy Nguyen  Outcome: Resolved/Met  9/29/2020 0909 by Misty BANKS  Outcome: Progressing Towards Goal  Note: Pressure Injury Interventions:  Sensory Interventions: Keep linens dry and wrinkle-free         Activity Interventions: Increase time out of bed, Pressure redistribution bed/mattress(bed type)    Mobility Interventions: Pressure redistribution bed/mattress (bed type)    Nutrition Interventions: Document food/fluid/supplement intake    Friction and Shear Interventions: Minimize layers                Problem: Patient Education: Go to Patient Education Activity  Goal: Patient/Family Education  9/29/2020 1033 by Misty BANKS  Outcome: Resolved/Met  9/29/2020 0909 by Misty BANKS  Outcome: Progressing Towards Goal     Problem: Falls - Risk of  Goal: *Absence of Falls  Description: Document Yuly Martinez Fall Risk and appropriate interventions in the flowsheet.   9/29/2020 1033 by Roxy Nguyen  Outcome: Resolved/Met  9/29/2020 0909 by Misty BANKS  Outcome: Progressing Towards Goal  Note: Fall Risk Interventions:  Mobility Interventions: Patient to call before getting OOB, Communicate number of staff needed for ambulation/transfer         Medication Interventions: Teach patient to arise slowly, Patient to call before getting OOB    Elimination Interventions: Patient to call for help with toileting needs, Call light in reach              Problem: Patient Education: Go to Patient Education Activity  Goal: Patient/Family Education  9/29/2020 1033 by Roxy Nguyen  Outcome: Resolved/Met  9/29/2020 0909 by Misty BANKS  Outcome: Progressing Towards Goal     Problem: Pain  Goal: *Control of Pain  9/29/2020 1033 by Roxy Nguyen  Outcome: Resolved/Met  9/29/2020 0909 by Misty BANKS  Outcome: Progressing Towards Goal     Problem: Patient Education: Go to Patient Education Activity  Goal: Patient/Family Education  9/29/2020 1033 by Shellia Mom  Outcome: Resolved/Met  9/29/2020 0909 by Shellia Mom  Outcome: Progressing Towards Goal     Problem: Diabetes Self-Management  Goal: *Disease process and treatment process  Description: Define diabetes and identify own type of diabetes; list 3 options for treating diabetes. 9/29/2020 1033 by Shellia Mom  Outcome: Resolved/Met  9/29/2020 0909 by Galileo Cluster R  Outcome: Progressing Towards Goal  Goal: *Incorporating nutritional management into lifestyle  Description: Describe effect of type, amount and timing of food on blood glucose; list 3 methods for planning meals. 9/29/2020 1033 by Shellia Mom  Outcome: Resolved/Met  9/29/2020 0909 by Galileo Cluster R  Outcome: Progressing Towards Goal  Goal: *Incorporating physical activity into lifestyle  Description: State effect of exercise on blood glucose levels. 9/29/2020 1033 by Shellia Mom  Outcome: Resolved/Met  9/29/2020 0909 by Galileo Cluster R  Outcome: Progressing Towards Goal  Goal: *Developing strategies to promote health/change behavior  Description: Define the ABC's of diabetes; identify appropriate screenings, schedule and personal plan for screenings. 9/29/2020 1033 by Shellia Mom  Outcome: Resolved/Met  9/29/2020 0909 by Galileo Cluster R  Outcome: Progressing Towards Goal  Goal: *Using medications safely  Description: State effect of diabetes medications on diabetes; name diabetes medication taking, action and side effects. 9/29/2020 1033 by Shellia Mom  Outcome: Resolved/Met  9/29/2020 0909 by Galileo Cluster R  Outcome: Progressing Towards Goal  Goal: *Monitoring blood glucose, interpreting and using results  Description: Identify recommended blood glucose targets  and personal targets.   9/29/2020 1033 by Shellia Mom  Outcome: Resolved/Met  9/29/2020 0909 by Galileo Cluster R  Outcome: Progressing Towards Goal  Goal: *Prevention, detection, treatment of acute complications  Description: List symptoms of hyper- and hypoglycemia; describe how to treat low blood sugar and actions for lowering  high blood glucose level. 9/29/2020 1033 by Inell Hazard  Outcome: Resolved/Met  9/29/2020 0909 by Terryann Dec R  Outcome: Progressing Towards Goal  Goal: *Prevention, detection and treatment of chronic complications  Description: Define the natural course of diabetes and describe the relationship of blood glucose levels to long term complications of diabetes.   9/29/2020 1033 by Inell Hazard  Outcome: Resolved/Met  9/29/2020 0909 by Terrdianan Dec R  Outcome: Progressing Towards Goal  Goal: *Developing strategies to address psychosocial issues  Description: Describe feelings about living with diabetes; identify support needed and support network  9/29/2020 1033 by Inell Hazard  Outcome: Resolved/Met  9/29/2020 0909 by Adrian Rockwell R  Outcome: Progressing Towards Goal  Goal: *Insulin pump training  9/29/2020 1033 by Inell Hazard  Outcome: Resolved/Met  9/29/2020 0909 by Adrian Rockwell R  Outcome: Progressing Towards Goal  Goal: *Sick day guidelines  9/29/2020 1033 by Inell Hazard  Outcome: Resolved/Met  9/29/2020 0909 by Terryann Dec R  Outcome: Progressing Towards Goal  Goal: *Patient Specific Goal (EDIT GOAL, INSERT TEXT)  9/29/2020 1033 by Inell Hazard  Outcome: Resolved/Met  9/29/2020 0909 by Adrian Rockwell R  Outcome: Progressing Towards Goal     Problem: Patient Education: Go to Patient Education Activity  Goal: Patient/Family Education  9/29/2020 1033 by Inell Hazard  Outcome: Resolved/Met  9/29/2020 0909 by Inell Hazard  Outcome: Progressing Towards Goal     Problem: Patient Education: Go to Patient Education Activity  Goal: Patient/Family Education  9/29/2020 1033 by Inell Hazard  Outcome: Resolved/Met  9/29/2020 0909 by Inell Hazard  Outcome: Progressing Towards Goal     Problem: Cardiac Valve Surgery: Post-Op Day 1  Goal: Off Pathway (Use only if patient is Off Pathway)  Outcome: Resolved/Met  Goal: Activity/Safety  Outcome: Resolved/Met  Goal: Consults, if ordered  Outcome: Resolved/Met  Goal: Diagnostic Test/Procedures  Outcome: Resolved/Met  Goal: Nutrition/Diet  Outcome: Resolved/Met  Goal: Medications  Outcome: Resolved/Met  Goal: Respiratory  Outcome: Resolved/Met  Goal: Treatments/Interventions/Procedures  Outcome: Resolved/Met  Goal: Psychosocial  Outcome: Resolved/Met  Goal: *Hemodynamically stable without vasoactive medications  Outcome: Resolved/Met  Goal: *Stable cardiac rhythm  Outcome: Resolved/Met  Goal: *Lungs clear or at baseline  Outcome: Resolved/Met  Goal: *Mechanical ventilation discontinued  Outcome: Resolved/Met  Goal: *Optimal pain control at patient's stated goal  Outcome: Resolved/Met  Goal: *Demonstrates progressive activity  Outcome: Resolved/Met  Goal: *Tolerating diet  Outcome: Resolved/Met     Problem: Cardiac Valve Surgery: Post-Op Day 2  Goal: Off Pathway (Use only if patient is Off Pathway)  Outcome: Resolved/Met  Goal: Activity/Safety  Outcome: Resolved/Met  Goal: Consults, if ordered  Outcome: Resolved/Met  Goal: Diagnostic Test/Procedures  Outcome: Resolved/Met  Goal: Nutrition/Diet  Outcome: Resolved/Met  Goal: Discharge Planning  Outcome: Resolved/Met  Goal: Medications  Outcome: Resolved/Met  Goal: Respiratory  Outcome: Resolved/Met  Goal: Treatments/Interventions/Procedures  Outcome: Resolved/Met  Goal: Psychosocial  Outcome: Resolved/Met  Goal: *Hemodynamically stable without vasoactive medications  Outcome: Resolved/Met  Goal: *Stable cardiac rhythm  Outcome: Resolved/Met  Goal: *Lungs clear or at baseline  Outcome: Resolved/Met  Goal: *Optimal pain control at patient's stated goal  Outcome: Resolved/Met  Goal: *Demonstrates progressive activity  Outcome: Resolved/Met  Goal: *Tolerating diet  Outcome: Resolved/Met  Goal: *Incisions without signs and symptoms of wound complication  Outcome: Resolved/Met     Problem: Cardiac Valve Surgery: Post-Op Day 3  Goal: Off Pathway (Use only if patient is Off Pathway)  Outcome: Resolved/Met  Goal: Activity/Safety  Outcome: Resolved/Met  Goal: Diagnostic Test/Procedures  Outcome: Resolved/Met  Goal: Nutrition/Diet  Outcome: Resolved/Met  Goal: Discharge Planning  Outcome: Resolved/Met  Goal: Medications  Outcome: Resolved/Met  Goal: Respiratory  Outcome: Resolved/Met  Goal: Treatments/Interventions/Procedures  Outcome: Resolved/Met  Goal: Psychosocial  Outcome: Resolved/Met  Goal: *Hemodynamically stable without vasoactive medications  Outcome: Resolved/Met  Goal: *Stable cardiac rhythm  Outcome: Resolved/Met  Goal: *Lungs clear or at baseline  Outcome: Resolved/Met  Goal: *Optimal pain control at patient's stated goal  Outcome: Resolved/Met  Goal: *Incisions without signs and symptoms of wound complication  Outcome: Resolved/Met  Goal: *Demonstrates progressive activity  Outcome: Resolved/Met  Goal: *Tolerating diet  Outcome: Resolved/Met     Problem: Cardiac Valve Surgery: Post-Op Day 4  Goal: Off Pathway (Use only if patient is Off Pathway)  Outcome: Resolved/Met  Goal: Activity/Safety  Outcome: Resolved/Met  Goal: Diagnostic Test/Procedures  Outcome: Resolved/Met  Goal: Nutrition/Diet  Outcome: Resolved/Met  Goal: Discharge Planning  Outcome: Resolved/Met  Goal: Medications  Outcome: Resolved/Met  Goal: Respiratory  Outcome: Resolved/Met  Goal: Treatments/Interventions/Procedures  Outcome: Resolved/Met  Goal: Psychosocial  Outcome: Resolved/Met  Goal: *Hemodynamically stable  Outcome: Resolved/Met  Goal: *Stable cardiac rhythm  Outcome: Resolved/Met  Goal: *Lungs clear or at baseline  Outcome: Resolved/Met  Goal: *Optimal pain control at patient's stated goal  Outcome: Resolved/Met  Goal: *Incisions without signs and symptoms of wound complication  Outcome: Resolved/Met  Goal: *Ambulating and performing exercises with assistance  Outcome: Resolved/Met  Goal: *Demonstrates progressive activity  Outcome: Resolved/Met  Goal: *Tolerating diet  Outcome: Resolved/Met     Problem: Cardiac Valve Surgery: Post-Op Day 5  Goal: Off Pathway (Use only if patient is Off Pathway)  Outcome: Resolved/Met  Goal: Activity/Safety  Outcome: Resolved/Met  Goal: Diagnostic Test/Procedures  Outcome: Resolved/Met  Goal: Nutrition/Diet  Outcome: Resolved/Met  Goal: Discharge Planning  Outcome: Resolved/Met  Goal: Medications  Outcome: Resolved/Met  Goal: Respiratory  Outcome: Resolved/Met  Goal: Treatments/Interventions/Procedures  Outcome: Resolved/Met  Goal: Psychosocial  Outcome: Resolved/Met  Goal: *Hemodynamically stable  Outcome: Resolved/Met  Goal: *Stable cardiac rhythm  Outcome: Resolved/Met  Goal: *Lungs clear or at baseline  Outcome: Resolved/Met  Goal: *Optimal pain control at patient's stated goal  Outcome: Resolved/Met  Goal: *Incisions without signs and symptoms of wound complication  Outcome: Resolved/Met  Goal: *Ambulating and performing exercises with assistance  Outcome: Resolved/Met  Goal: *Demonstrates progressive activity  Outcome: Resolved/Met  Goal: *Tolerating diet  Outcome: Resolved/Met     Problem: Cardiac Valve Surgery: Post-Op Day 6  Goal: Off Pathway (Use only if patient is Off Pathway)  Outcome: Resolved/Met  Goal: Activity/Safety  9/29/2020 1033 by Toby BANKS  Outcome: Resolved/Met  9/29/2020 0909 by Toby BANKS  Outcome: Progressing Towards Goal  Goal: Diagnostic Test/Procedures  9/29/2020 1033 by Toby BANKS  Outcome: Resolved/Met  9/29/2020 0909 by Toby BANKS  Outcome: Progressing Towards Goal  Goal: Nutrition/Diet  9/29/2020 1033 by Toby BANKS  Outcome: Resolved/Met  9/29/2020 0909 by Toby BANKS  Outcome: Progressing Towards Goal  Goal: Discharge Planning  9/29/2020 1033 by Toby BANKS  Outcome: Resolved/Met  9/29/2020 8896 by Roxy Nguyen  Outcome: Progressing Towards Goal  Goal: Medications  9/29/2020 1033 by Misty Nunes R  Outcome: Resolved/Met  9/29/2020 0909 by Misty Nunes R  Outcome: Progressing Towards Goal  Goal: Respiratory  9/29/2020 1033 by Misty Nunes R  Outcome: Resolved/Met  9/29/2020 0909 by Misty Nunes R  Outcome: Progressing Towards Goal  Goal: Treatments/Interventions/Procedures  9/29/2020 1033 by Misty Nunes R  Outcome: Resolved/Met  9/29/2020 0909 by Misty Nunes R  Outcome: Progressing Towards Goal  Goal: Psychosocial  9/29/2020 1033 by Misty Nunes R  Outcome: Resolved/Met  9/29/2020 0909 by Misty Nunes R  Outcome: Progressing Towards Goal     Problem: Cardiac Valve Surgery: Discharge Outcomes  Goal: *Hemodynamically stable  9/29/2020 1033 by Misty Nunes R  Outcome: Resolved/Met  9/29/2020 0909 by Misty Nunes R  Outcome: Progressing Towards Goal  Goal: *Stable cardiac rhythm  9/29/2020 1033 by Sandra Gold  Outcome: Resolved/Met  9/29/2020 0909 by Misty BANKS  Outcome: Progressing Towards Goal  Goal: *Lungs clear or at baseline  9/29/2020 1033 by Sandra Gold  Outcome: Resolved/Met  9/29/2020 0909 by Misty BANKS  Outcome: Progressing Towards Goal  Goal: *Demonstrates ability to perform prescribed activity without shortness of breath or discomfort  9/29/2020 1033 by Debbie Gold  Outcome: Resolved/Met  9/29/2020 0909 by Misty BANKS  Outcome: Progressing Towards Goal  Goal: *Verbalizes home exercise program, activity guidelines, cardiac precautions  9/29/2020 1033 by Misty Nunes R  Outcome: Resolved/Met  9/29/2020 0909 by Misty Nunes R  Outcome: Progressing Towards Goal  Goal: *Optimal pain control at patient's stated goal  9/29/2020 1033 by Misty Nunes R  Outcome: Resolved/Met  9/29/2020 0909 by Misty BANKS  Outcome: Progressing Towards Goal  Goal: *Verbalizes understanding and describes prescribed diet  9/29/2020 1033 by Misty Nunes R  Outcome: Resolved/Met  9/29/2020 0909 by Roxy Nguyen  Outcome: Progressing Towards Goal  Goal: *Verbalizes name, dosage, time, side effects, and number of days to continue medications  9/29/2020 1033 by Misty Nunes R  Outcome: Resolved/Met  9/29/2020 0909 by Misty Nunes R  Outcome: Progressing Towards Goal  Goal: *Weight  is stable  9/29/2020 1033 by Misty Nunes R  Outcome: Resolved/Met  9/29/2020 0909 by Misty Nunes R  Outcome: Progressing Towards Goal  Goal: *No signs and symptoms of infection or wound complications  9/45/3876 1033 by Sandra Gold  Outcome: Resolved/Met  9/29/2020 0909 by Misty Nunes R  Outcome: Progressing Towards Goal  Goal: *Anxiety reduced or absent  9/29/2020 1033 by Sandra Gold  Outcome: Resolved/Met  9/29/2020 0909 by Misty Nunes R  Outcome: Progressing Towards Goal  Goal: *Verbalizes and demonstrates incision care  9/29/2020 1033 by Misty Nunes R  Outcome: Resolved/Met  9/29/2020 0909 by Misty BANKS  Outcome: Progressing Towards Goal  Goal: *Understands and describes signs and symptoms to report to providers(Stroke Metric)  9/29/2020 1033 by Misty Nunes R  Outcome: Resolved/Met  9/29/2020 0909 by Misty Nunes R  Outcome: Progressing Towards Goal  Goal: *Describes follow-up/return visits to physicians  9/29/2020 1033 by Royx Nguyen  Outcome: Resolved/Met  9/29/2020 0909 by Misty BANKS  Outcome: Progressing Towards Goal  Goal: *Describes available resources and support systems  9/29/2020 1033 by Roxy Nguyen  Outcome: Resolved/Met  9/29/2020 0909 by Misty BANKS  Outcome: Progressing Towards Goal  Goal: *Expresses feelings about diagnosis and surgery  9/29/2020 1033 by Misty Nunes R  Outcome: Resolved/Met  9/29/2020 0909 by Misty Nunes R  Outcome: Progressing Towards Goal  Goal: *Influenza immunization  9/29/2020 1033 by Misty BANKS  Outcome: Resolved/Met  9/29/2020 0909 by Misty BANKS  Outcome: Progressing Towards Goal  Goal: *Pneumococcal immunization  9/29/2020 1033 by Dawit Link  Outcome: Resolved/Met  9/29/2020 0909 by Pratibha BANKS  Outcome: Progressing Towards Goal     Problem: Patient Education: Go to Patient Education Activity  Goal: Patient/Family Education  Outcome: Resolved/Met     Problem: Patient Education: Go to Patient Education Activity  Goal: Patient/Family Education  Outcome: Resolved/Met

## 2020-09-29 NOTE — DISCHARGE SUMMARY
[facility] Discharge Summary     Patient ID:  Jaspreet Appiah  [id number]  89 y.o.  1950    Admit date: 9/21/2020    Discharge date: 9/29/2020     Admitting Physician: Marvin Milton MD     Referring Cardiologist:  Dr. Marie Mccartney     PCP:  N/a    Admitting Diagnoses: AS    Discharge Diagnoses:     Hospital Problems  Date Reviewed: 9/21/2020          Codes Class Noted POA    S/P AVR (aortic valve replacement) and aortoplasty ICD-10-CM: Z95.2  ICD-9-CM: V43.3  9/22/2020 Unknown    Overview Signed 9/22/2020  2:18 PM by [place] Soto     REDO STERNOTOMY  AORTIC VALVE REPLACEMENT WITH 25MM BIOPROSTHETIC HEART VALVE  RIGHT FEMORAL artery and vein CUTDOWN FOR CANNULATION             Aortic valve stenosis, severe ICD-10-CM: I35.0  ICD-9-CM: 424.1  9/21/2020 Unknown        Aortic stenosis ICD-10-CM: I35.0  ICD-9-CM: 424.1  9/21/2020 Unknown              Discharged Condition: improved    Disposition: home, see patient instructions for treatment and plan    Procedures for this admission:  Procedure(s):  REDO STERNOTOMY, AORTIC VALVE REPLACEMENT WITH 25MM BIOPROSTHETIC HEART VALVE; RIGHT FEMORAL CUTDOWN FOR CANNULATION; CRISTINA AND EPIAORTIC BY DR. Senait Will     Discharge Medications:      My Medications      START taking these medications      Instructions Each Dose to Equal Morning Noon Evening Bedtime   amiodarone 200 mg tablet  Commonly known as:  CORDARONE    Your last dose was: Your next dose is: Take 200 mg by mouth twice a day x 2 weeks, then decrease to 200 mg by mouth once daily x 2 weeks. Indications: prevention of post cardio-thoracic surgery atrial fibrillation                  aspirin 325 mg tablet  Commonly known as:  ASPIRIN  Start taking on:  September 30, 2020  Replaces:  aspirin 81 mg chewable tablet    Your last dose was: Your next dose is: Take 1 Tab by mouth daily. 325 mg                 carvediloL 12.5 mg tablet  Commonly known as:  COREG    Your last dose was:       Your next dose is: Take 1 Tab by mouth two (2) times daily (with meals). Indications: chronic heart failure   12.5 mg                 cloNIDine HCL 0.1 mg tablet  Commonly known as:  CATAPRES    Your last dose was: Your next dose is: Take 1 Tab by mouth two (2) times a day. Indications: high blood pressure   0.1 mg                 oxyCODONE IR 5 mg immediate release tablet  Commonly known as:  ROXICODONE    Your last dose was: Your next dose is: Take 1 Tab by mouth every six (6) hours as needed for Pain for up to 7 days. Max Daily Amount: 20 mg.   5 mg                 senna-docusate 8.6-50 mg per tablet  Commonly known as:  PERICOLACE    Your last dose was: Your next dose is: Take 1 Tab by mouth two (2) times a day. 1 Tab                 spironolactone 25 mg tablet  Commonly known as:  ALDACTONE  Start taking on:  September 30, 2020    Your last dose was: Your next dose is: Take 1 Tab by mouth daily. Indications: accumulation of fluid resulting from chronic heart failure   25 mg                 tamsulosin 0.4 mg capsule  Commonly known as:  FLOMAX  Start taking on:  September 30, 2020    Your last dose was: Your next dose is: Take 1 Cap by mouth daily. 0.4 mg                 torsemide 20 mg tablet  Commonly known as:  DEMADEX  Start taking on:  September 30, 2020    Your last dose was: Your next dose is: Take 1 Tab by mouth daily. Indications: accumulation of fluid resulting from chronic heart failure   20 mg                    CHANGE how you take these medications      Instructions Each Dose to Equal Morning Noon Evening Bedtime   amLODIPine 5 mg tablet  Commonly known as:  NORVASC  What changed:  how much to take    Your last dose was: Your next dose is: Take 2 Tabs by mouth daily.  Indications: high blood pressure   10 mg                 insulin lispro protamine/insulin lispro 100 unit/mL (75-25) injection  Commonly known as:  HumaLOG Mix 75-25(U-100)Insuln  What changed:  how much to take    Your last dose was: Your next dose is:          25 Units by SubCUTAneous route Before breakfast and dinner. Indications: type 2 diabetes mellitus   25 Units                    CONTINUE taking these medications      Instructions Each Dose to Equal Morning Noon Evening Bedtime   allopurinoL 100 mg tablet  Commonly known as:  ZYLOPRIM    Your last dose was: Your next dose is: Take 0.5 Tabs by mouth daily. (Dose = 0.5 x 100 mg tablet)   50 mg                 atorvastatin 40 mg tablet  Commonly known as:  Lipitor    Your last dose was: Your next dose is: Take 1 Tab by mouth daily. 40 mg                 B Complex 1 tablet  Generic drug:  b complex vitamins    Your last dose was: Your next dose is: Take 1 Tab by mouth daily. 1 Tab                 CALCIUM 500 PO    Your last dose was: Your next dose is: Take 300 mg by mouth daily. 300 mg                 cholecalciferol (vitamin D3) 50 mcg (2,000 unit) Tab    Your last dose was: Your next dose is: Take 5,000 Units by mouth daily. 5,000 Units                 Insulin Syringe-Needle U-100 1 mL 30 gauge x 1/2\" Syrg  Commonly known as:  BD Insulin Syringe Ultra-Fine    Your last dose was: Your next dose is:          Use twice a day. Dx: E11.9                  magnesium oxide 400 mg tablet  Commonly known as:  MAG-OX    Your last dose was: Your next dose is: Take 400 mg by mouth daily. 400 mg                 pioglitazone 15 mg tablet  Commonly known as:  ACTOS    Your last dose was: Your next dose is: Take 1 Tab by mouth daily. 15 mg                 turmeric root extract 500 mg Cap    Your last dose was: Your next dose is: Take  by mouth.                   Vitamin C 1,000 mg tablet  Generic drug:  ascorbic acid (vitamin C)    Your last dose was: Your next dose is: Take 2,000 mg by mouth daily. 2,000 mg                    STOP taking these medications    aspirin 81 mg chewable tablet  Replaced by:  aspirin 325 mg tablet        clindamycin 300 mg capsule  Commonly known as:  CLEOCIN        furosemide 40 mg tablet  Commonly known as:  LASIX        losartan 100 mg tablet  Commonly known as:  COZAAR        potassium 99 mg tablet              Where to Get Your Medications      These medications were sent to [facility]/pharmacy [address] [place], [place] [address] [address] [address]    Phone:  [phone]   · amiodarone 200 mg tablet  · aspirin 325 mg tablet  · carvediloL 12.5 mg tablet  · cloNIDine HCL 0.1 mg tablet  · oxyCODONE IR 5 mg immediate release tablet  · senna-docusate 8.6-50 mg per tablet  · spironolactone 25 mg tablet  · tamsulosin 0.4 mg capsule  · torsemide 20 mg tablet     Information on where to get these meds will be given to you by the nurse or doctor. Ask your nurse or doctor about these medications  · amLODIPine 5 mg tablet  · insulin lispro protamine/insulin lispro 100 unit/mL (75-25) injection         HPI:  Jaspreet Mcknight is a 78 y.o. male PMHx of s/p CABG x 2, AVR (23 Trifecta) in 10/2014 by Dr. Juancarlos Gonzales d/t endocarditis, CKD3, CHF, HTN, GERD, A flutter, DM2, s/p partial colectomy, ANDRY (uses CPAP), BPH, HLD; that is referred to the [address] [place] [address] by Dr. Marcelino Trotter interventional evaluation of mod to severe bioprosthetic aortic valve stenosis.        Pt reports feeling more dyspnea, fatigue, and weakness in last 2-3 weeks. Lubna List has gained 20-30 lbs since beginning on pandemic in March 2020.  THOMPSON with minimal exertion, with walking short distances.  He now has to stop several times to rest while cutting grass, where last year he could do this without stopping.  Denies CP, orthopnea, PND, syncope, dizziness, falls, palpitations.  He has some mild LE edema that is unchanged.  Intermittently his lower legs get red L > R and resolve within a few days.       Former tobacco smoker, quit in 2000. Drinks alcohol rarely and denies drug use.  and lives independently with his wife.  His family history is positive for heart disease in his mother. Rossy Ramirez does not use any assistive devices.       Of note, pt had new dx of A flutter in June 2020 at Dr. Terrell Peguero office, was started on eliquis.  Pt only took for a couple days and stopped because he didn't like the appearance of his blood. Rossy Ramirez is now taking two baby aspirin instead.    Pt's left foot 3rd toe noted to be red, pt reports picking at the nail and its been red x 3 days. [facility] has been applying alcohol to it.        Blood/Blackness/Tarriness of stools: No:    Heart Failure Admission w/in past year:  No:   Heart Failure Admission w/in past 2 weeks: No:      NYHA Classification: IIIB              Class I (Mild): No limitation of physical activity. Ordinary physical activity does not cause undue fatigue, palpitation, or dyspnea.              Class II (Mild): Slight limitation of physical activity. Comfortable at rest, but ordinary physical activity results in fatigue, palpitation, or dyspnea.              Class III (Moderate): Marked limitation of physical activity. Comfortable at rest, but less than ordinary activity causes fatigue, palpitation, or dyspnea              Class IV (Severe): Unable to carry out any physical activity without discomfort.  Symptoms  of cardiac insufficiency at rest.  If any physical activity is undertaken, discomfort is increased.     Angina Classification: 1              Class 0: No symptoms              Class 1: Angina with strenuous exercise              Class 2: Angina with moderate exercise              Class 3: Angina with mild exertion              Walking 1-2 level blocks at normal pace; Climbing 1 flight of stairs at normal pace  Class 4: Angina at any level of physical exertion           Cardiac Testing  ZYT: 0867 NSR     TTE 7/2020:  Mod to severe degenerative bioprosthetic aortic stenosis, valve well seated, mean gradient 28, peak 48 mm HG, TAYLER 1.0 cm2.  Normal LV EF 32%, grade 2 diastolic dysfunction     EER 9/97/05:   · Normal cavity size and systolic function (ejection fraction normal). Mild concentric hypertrophy. Estimated left ventricular ejection fraction is 55 - 60%. Left ventricular diastolic dysfunction. · Mildly dilated left atrium. No spontaneous echo contrast Left atrial appendage velocity is normal (greater than 40 cm/sec). · Agitated saline contrast study was performed and color flow Doppler was used. There was no shunting at baseline. · Prosthetic aortic valve. Aortic valve mean gradient is 35.4 mmHg. Aortic valve area is 1 cm2. Moderate to severe aortic valve stenosis is present. There is a 23 mm [facility] Trifecta bioprosthetic aortic valve. · Mild mitral valve regurgitation is present.     LVOT/AV VTI 32.3/90.8 cm  AV Vmax 3.95 m/s  AV MG 35.4 mmHg  AV PG 62.5 mmHg  LVOTd 1.9 cm     TAYLER 1.01 cm2  DI 0.36     Plan RHC/LVEDP/aortic valve study (no coronaries) to evaluate aortic valve hemodynamics. Case discussed with Dr. Alphonso Chaudhary. Results and plans discussed with patient and his wife.      Echo Findings      Left Ventricle  Normal cavity size and systolic function (ejection fraction normal). Mild concentric hypertrophy.  The estimated ejection fraction is 55 - 60%. There is left ventricular diastolic dysfunction. Wall Scoring  The left ventricular wall motion is normal.              Left Atrium  Mildly dilated left atrium. There is no thrombus within the left atrial appendage. There is no mass within the left atrial appendage. No spontaneous echo contrast Appendage velocity is normal (greater than 40 cm/sec). Right Ventricle  Normal cavity size and global systolic function. Right Atrium  Normal cavity size.     Interatrial Septum  No PFO noted on color Doppler. Agitated saline contrast study was performed. There was no shunting at baseline. Lipomatous hypertrophy of the interatrial septum. Aortic Valve  No regurgitation. Prosthetic aortic valve. Aortic valve peak gradient is 62.5 mmHg. Aortic valve mean gradient is 35.4 mmHg. Aortic valve area is 1 cm2. Aortic valve peak velocity is 4 cm/s. There is moderate to severe aortic stenosis. Aortic valve AccT 110-120 ms There is a 23 mm [facility] Trifecta bioprosthetic valve present. LVOT/AV VTI 32.3/90.8 cm  AV Vmax 3.95 m/s  AV MG 35.4 mmHg  AV PG 62.5 mmHg  LVOTd 1.9 cm    TAYLER 1.01 cm2  DI 0.36. Mitral Valve  Normal valve structure and no stenosis. Mild regurgitation. Tricuspid Valve  Normal valve structure and no stenosis. Trace regurgitation. Pulmonic Valve  Normal valve structure, no stenosis and no regurgitation. Aorta  Normal aortic root. Pericardium  Normal pericardium and no evidence of pericardial effusion.             L & R Cardiac catheterization: 8/10/20  . No significant change in native CAD with high-grade stenosis in proximal ramus intermedius and sub-totally occluded proximal LAD.     2. Widely patent LIMA-LAD and SVG-ramus intermedius.     3. Moderate/severe bioprosthetic aortic valve stenosis (EOA 1.31-1.40 cm2, EOAi 0.52-0.55 cm2/m2 using BSA 2.52, MG 39 mmHg).  No hemodynamically significant aortic regurgitation. Dorothye Hero is likely mild to moderate structural valve degeneration with a component of prosthesis-patient mismatch, leading to a low EOAi.        4. High-normal right-sided filling pressure (RA 12/11/9) with moderately elevated left-sided filling pressures (PCWP 13/13/11; LVEDP 25 mmHg).     5. High-normal pulmonary artery pressures (PA 34/13/22) with normal PVR (1.93 Farah), TPG 11 mmHg.     6. Normal cardiac output (Sariah 5.71 L/min) and cardiac index (Sariah 2.2 L/min/m2).     7. Normal SVR (1205 dynes/sec/cm-5).    8.  Results discussed with the patient, the patient's wife and Dr. Gila Coffman (the patient's surgeon in 2014 for CABG + AVR).      9. Continue evaluation for possible Marybeth TAVR vs. re-do bioprosthetic AVR with root enlargement vs. redo mechanical AVR.           Complications      Complications documented before study signed (8/10/2020 11:21 PM)       No complications were associated with this study. Documented by Gem Moraes MD - 8/10/2020 10:55 PM       Coronary Findings      Diagnostic   Dominance: Right   Left Anterior Descending    Prox LAD lesion, 100% stenosed. Ramus Intermedius    Ramus-1 lesion, 85% stenosed. Ramus-2 lesion, 80% stenosed. Right Coronary Artery    The vessel exhibits minimal luminal irregularities. LIMA Graft to Mid LAD    Graft to Ramus    Intervention      No interventions have been documented. Left Heart      Left Ventricle  LV EDP is: 25. Right Heart      Right Heart Cath  PA pressure = 34/14 mmHg. PA mean = 22 mmHg. PA Sat = 78.5 %. Mid RA pressure = 12/12 mmHg. RV pressure = 41/8 mmHg. RV mean = 13 mmHg. Wedge pressure = 11 mmHg. AO Sat = 99 %. Sariah CO = 5.86 L/min.    Sariah CO 5.86  Sariah CI 2.33    Sariah CO #2 5.71  Sariah CI #2 2.27    Hemo Data Vitals - VO2 Value: 314.76 ml/min  Hb: 19 %   Blood Oximetry Information - AV Hb PV: 0 gm/dL   Pressure Phase - Phase: Phase: Baseline   AO Pressures - AO Systolic: 185 mmHg  AO Diastolic: 72 mmHg  AO Mean: 95 mmHg  Heart Rate: 54 bpm   PA Pressures - PA Systolic: 34 mmHg  PA Diastolic: 14 mmHg  PA Mean: 22 mmHg   RV Pressures - RV Systolic: 41 mmHg  RV End Diastolic: 13 mmHg  RV dP/dt: 480 mmHg/sec   LV Pressures - LV Systolic: 802 mmHg  LV End Diastolic: 25 mmHg LV dP/dt: 2496 mmHg/sec   RA Pressures - RA Wedge A Wave: 11 mmHg  RA Wedge V Wave: 12 mmHg  RA Wedge Mean: 9 mmHg   PCW Pressures - PCW A Wave: 13 mmHg  PCW V Wave: 13 mmHg  PCW Mean: 11 mmHg   Atrial Wedge Pressures - Source: Measured  RA Atrial Wedge Heart Rate: 65 bpm   Cath Pressure - FA End Diastolic Cath Pressure: 54 mmHg  PCW Source: Measured  PCW Heart Rate: 58 bpm   Cardiac Output Results - Sariah C.O.: 5.86 L/min  Sariah C. I.: 2.33 L/min/m2  QPI: 2.33 L/min/m2  QSI Value: 2.33 L/min/m2   Resistance Results - PVR: 150.23 dsc-5  PVR-I: 378.29 dsc-5/m2  SVR: 1174.49 dsc-5  SVR-I: 2957.51 dsc-5/m2  PVR/SVR: 0.13  TPR: 300.45 dsc-5  TPR-I: 756.57 dsc-5/m2  TVR: 1297.4 dsc-5  TVR-I: 3267.01 dsc-5/m2  TPR/TVR: 0.23   Aortic Valve Results - AV SEP: 15.2 sec/beat  AV Area: 1.4 cm2  AV Flow: 385.25 cc  AV Index: 0.55 cm2/m2  AV Gradient: 38.75 mmHg   Stroke Work Results - LVSW: 123.9 gm*m  LVSW-I: 49.2 gm*m/m2  RVSW: 17.55 gm*m  RVSW-I: 6.97 gm*m/m2   Valve Results - AV SEP: 15.2 sec/beat  AV Flow: 385.25 cc  AV Area: 1.4 cm2  AV Index: 0.55 cm2/m2  Aortic Valve HR: 55 bpm  AV Gradient: 38.75 mmHg  TPR/TVR: 0.23  PVR/SVR: 0.13             Carotid Dopplers: 12/2019 no ICA stenosis bilat     PFTs: FEV1/Predicted:  n/a  Normal FEV1 > 1 Liter, Predicted = 100%, DLCO > 80%                          Mild Lung Disease (60-70% Predicted)                          Moderate Lung Disease (50-55% Predicted)                          Severe Lung Disease (< 50% Predicted or PO2 < 60 or pCO2>50 on RA)     Gated C/A/P CTA: reviewed by surgeon    Hospital Course: PT had REDO STERNOTOMY, AORTIC VALVE REPLACEMENT WITH 25MM BIOPROSTHETIC HEART VALVE, RIGHT FEMORAL artery and vein CUTDOWN FOR CANNULATION on 9/22/20 by Dr. Ofelia Leos. See operative note for full details. Pt was transferred to ICU in stable condition on the following drips: Amio, Precedex, Insulin. Pt was extubated on 9/22/20 at 1810.        POD#7: 1. Mod to severe bioprosthetic AV stenosis s/p redo AVR: 325 mg ASA. Post-op TTE completed 9/27.     2. CAD S/p CABG 2014: on asa, statin,  Metoprolol changed to coreg by Dr. Parisa Hardin.       3. DM2: On insulin 75/25, actos at home. Hgba1c of 8.6. DTS consult. Cont NPH 28 units BID. BS ACHS, change SSI to insulin resistent.   May need to resume lispro 10 units w/ meals as BS > 200.       4. Chronic Diastolic CHF: Now on coreg, added aldactone. Change IV diuretics to PO torsemide 20 mg daily.       5. HTN: Cont norvasc 10 mg PO daily, on metoprolol 25 PO BID, on lasix 40 mg IV BID today. Cozaar PTA--hold off d/t CKD. BP > elevated, start clonidine 0.1 mg PO BID, PRN Hydralazine     6. HLD: on statin     7. ANDRY: uses cpap     8. CKD3: Creatinine 2.6 today. Avoid hypotension and nephrotoxic agents. Continue to monitor. Weight down 2 lbs -- change to torsemide 20 mg PO daily.       9. BPH: Cont flomax today.     10. A flutter: Resolved <24hrs, hold off on eliquis. Now SB. Cont BB, amio 200 mg PO BID     11. Anemia: Postoperative secondary to acute blood loss. Currently above transfusion threshold. Monitor daily CBC.     12. Atelectasis:  IS and TCDB. Increase activity. Cont diuretics. On RA     13. Thrombocytopenia:resolved,  On 325mg aspirin. On Protonix.      14. Nutrition: Cardiac w/ carb consistent      15. DVT/GI prophylaxis: SCD, PPI     16. Hyponatremia: 137 today. Improving w/ diuresis. Monitor      Dispo: PT/OT/Cardiac Rehab. [facility]. Needs labs to monitor kidney fxn by [address] [place] [address]. D/c AV wires. Referral to outpatient cardiac rehab made. Discharge Vital Signs:   Visit Vitals  BP (!) 186/67 (BP 1 Location: Left arm, BP Patient Position: At rest)   Pulse 63   Temp 97.5 °F (36.4 °C)   Resp 18   Ht 6' (1.829 m)   Wt 306 lb (138.8 kg)   SpO2 95%   BMI 41.50 kg/m²       Labs:   Recent Labs     09/29/20  0621 09/29/20  0331   WBC  --  8.0   HGB  --  12.5   HCT  --  37.5   PLT  --  189   NA  --  137   K  --  3.8   BUN  --  54*   CREA  --  2.62*   GLU  --  47*   GLUCPOC 76  --        Diagnostics:   CXR Results  (Last 48 hours)    None          Patient Instructions/Follow Up Care:  Discharge instructions were reviewed with the patient and family present. Questions were also answered at this time.  Prescriptions and medications were reviewed. The patient has a follow up appointment with the Nurse Practitioner or [de-identified] Assistant on 10/2/20 at [address] [place] [address] and 10/27/20 at 1pm. The patient was also instructed to follow up with his primary care physician as needed. The patient and family were encouraged to call with any questions or concerns.        Signed:  Chris Rueda NP  9/29/2020  10:18 AM

## 2020-09-29 NOTE — PROGRESS NOTES
Cardiac Surgery Care Coordinator-  Met with Rand Bazan and his wife, reviewed plan of care and discharge instructions. Reinforced move in the tube, sternal precautions and continued use of the incentive spirometer. Reviewed the importance of daily temp and weight monitoring, discussed incisional care and reviewed signs and symptoms of infection. Red reminder bracelet on right wrist, reviewed purpose of the bracelet and when to call the MD. Using the teach back method reviewed new medications  Reminded pt of appts and encouraged participation in the Cardiac Wellness and rehab program after discharge. Encouraged Rand Bazan and his wife  to verbalize and emotional support given. They are  without questions or concerns at this time.  Nadya Valencia RN

## 2020-09-29 NOTE — PROGRESS NOTES
AV wires x 2 removed without difficulty. Cleaned prior to removal. Pt instructed to stay in bed x 1 hr (1130am). Rakesh Cuevas for discharge after 230pm.  RN Notified.

## 2020-09-29 NOTE — DISCHARGE INSTRUCTIONS
Cardiac Surgery Specialist    [address] [place] [address] [address] [address]  Office- [phone]  Fax- [phone]       Office- [phone]  Fax- [phone]  _____________________________________________________________  Dr. Allyson Peasant Dr. Lennart Decant Dr. Landon Nissen [facility]   Bita Peters, AGACNP  Ashu Cadet, JOSIAS Bush, AGPCNP  ______________________________________________________________    Name:Jeancarlos Kent     Surgery & Date: Procedure(s):  REDO STERNOTOMY, AORTIC VALVE REPLACEMENT WITH 25MM BIOPROSTHETIC HEART VALVE; RIGHT FEMORAL CUTDOWN FOR CANNULATION; CRISTINA AND EPIAORTIC BY DR. Ciaran Mayen     Discharge Date: 9/29/20     MEDICATIONS:  Please refer to your After Visit Summary for your medication list. If you do not have a prescription for a new medication, you may purchase the medication over the counter. DO NOT TAKE ANY MEDICATIONS THAT ARE NOT ON THIS LIS    INSTRUCTIONS:  1. NO SMOKING OR TOBACCO PRODUCTS  2. Follow all the instructions in your discharge book  3. You may shower. Wash all incisions twice daily with mild soap and water. No lotions, ointments or powder. 4. Call the office immediately for any redness, swelling, or drainage from your incision. 5. Take your temperature daily and call for a temperature of 101 degrees or higher or for any symptoms that make you think you have and infection. 6. Weigh yourself each morning. Call if you gain more than 5 pounds in 48 hours. 7. Use the incentive spirometer 6-8 times a day-10 breaths each time. Use a pillow or your bear to splint your breastbone when coughing or sneezing.   8. If you feel your breast bone clicking or popping, notify the office immediately. 9. Walk several hundred feet several times daily. DIET  Eat an American Heart Association diet. If you are having trouble with your appetite, eat what you can. Try eating small, frequent meals throughout the day. ACTIVITY  1. NO DRIVING--you will be evaluated to drive at your follow up visit. 2. Increase your activity by walking several times a day. Stay out of bed most of the day. 3. When sitting, keep your legs elevated. 4. You may ride in a car, but you must get out every hour and walk around. If you ride in a car with an airbag that can not be switched off, put the seat ALL the way back or ride in the back seat. 5. Any load bearing activity can be performed as long as it can be performed \"in the tube\". You can reach \"out of the tube\" when performing activities that don't require heavy lifting. Let pain be your guide, your pain level will keep you from doing anything extreme. FOLLOW UP  1. Your first follow up appointment will be on 10/2/20 at 11am(this is telephone based appt only). Our office is located in [address] [place] [address]. Your second follow up appointment will be in four weeks, on 10/27/20 at 1pm. Please call our office at [phone] if you are unable to make either one of these appointments. 2. You will be receiving a call before your 5 day appointment to begin cardiac rehab. They are located in the [address] [place] [address] on [facility]. Their phone number is [phone]. Please call if you have not been contacted 2-3 weeks after discharge from the hospital.  3. We will make an appointment with your cardiologist at your last appointment. 4. Consult you primary care physician regarding your influenza &   pneumovax vaccines. 5.   Please bring all medications with you to your appointment. Signature:___________________________________________________                Diabetes Management:  1.  Take a blood glucose reading four times daily:  First thing in the morning before you eat or drink anything. Then before lunch, dinner and bedtime. Make a log and bring to your next doctor appointment. If your blood sugar is over 200 consistently OR   If your blood sugar is under 100 consistently: call your doctor for a medication adjustment    2. Take your insulin two times a day- first thing in the morning and before dinner. Goal A1C is 7%. 3. Make a follow up appointment with your endocrinologist or primary care physician. Please see him within the next 2 weeks. 4. Make sure to get a DILATED eye exam every year. This checks for retinal blood vessel damage caused by long term high blood sugar. 5. Make sure to examine your feet every day looking for any wound or foot irritation as sensation can be impaired in your feet. Always wear socks and/or slippers even while at home. This will protect your feet from injury. 6. Diabetes Self Management Training:  Outpatient appointments are available with a certified diabetic educator who can  you with meal planning, insulin administration and other diabetes management strategies. Please call [phone] to schedule at a location close to your home.

## 2020-09-30 ENCOUNTER — HOME CARE VISIT (OUTPATIENT)
Dept: SCHEDULING | Facility: HOME HEALTH | Age: 70
End: 2020-09-30
Payer: MEDICARE

## 2020-09-30 VITALS
TEMPERATURE: 98.2 F | HEART RATE: 48 BPM | RESPIRATION RATE: 18 BRPM | SYSTOLIC BLOOD PRESSURE: 124 MMHG | HEIGHT: 72 IN | DIASTOLIC BLOOD PRESSURE: 64 MMHG | OXYGEN SATURATION: 98 % | WEIGHT: 306 LBS | BODY MASS INDEX: 41.45 KG/M2

## 2020-09-30 PROCEDURE — 400013 HH SOC

## 2020-09-30 PROCEDURE — G0299 HHS/HOSPICE OF RN EA 15 MIN: HCPCS

## 2020-09-30 PROCEDURE — G0151 HHCP-SERV OF PT,EA 15 MIN: HCPCS

## 2020-10-01 ENCOUNTER — TELEPHONE (OUTPATIENT)
Dept: CASE MANAGEMENT | Age: 70
End: 2020-10-01

## 2020-10-02 ENCOUNTER — HOME CARE VISIT (OUTPATIENT)
Dept: SCHEDULING | Facility: HOME HEALTH | Age: 70
End: 2020-10-02
Payer: MEDICARE

## 2020-10-02 PROCEDURE — G0157 HHC PT ASSISTANT EA 15: HCPCS

## 2020-10-02 PROCEDURE — G0299 HHS/HOSPICE OF RN EA 15 MIN: HCPCS

## 2020-10-02 NOTE — PROCEDURES
[address] [facility]  PULMONARY FUNCTION TEST    Name:  Remy Fallon  MR#:  [id number]  :  1950  ACCOUNT #:  [de-identified]  DATE OF SERVICE:  2020      REQUESTING PHYSICIAN:  Jose Harris NP    ATS criteria for reproducibility and acceptability was met. SPIROMETRY:  FEV1/FVC ratio is 79, which is normal.  FEV1 is 2.67 L, which is 74% predicted, which is normal.  FVC is 3.39 L, which is 70% predicted, which is reduced. Flow-volume loop is normal.    INTERPRETATION:  Restrictive spirometry. Obstruction not ruled out. Clinical correlation advised.         Meggan Obando MD MA/[email or web address]/  D:  10/02/2020 12:34  T:  10/02/2020 13:01  JOB #:  [id number]  CC:  Jose Harris NP

## 2020-10-03 VITALS
HEART RATE: 45 BPM | OXYGEN SATURATION: 96 % | DIASTOLIC BLOOD PRESSURE: 70 MMHG | RESPIRATION RATE: 18 BRPM | SYSTOLIC BLOOD PRESSURE: 121 MMHG

## 2020-10-05 ENCOUNTER — TELEPHONE (OUTPATIENT)
Dept: CARDIOLOGY CLINIC | Age: 70
End: 2020-10-05

## 2020-10-05 ENCOUNTER — HOME CARE VISIT (OUTPATIENT)
Dept: SCHEDULING | Facility: HOME HEALTH | Age: 70
End: 2020-10-05
Payer: MEDICARE

## 2020-10-05 ENCOUNTER — TELEPHONE ANTICOAG (OUTPATIENT)
Dept: CARDIOLOGY CLINIC | Age: 70
End: 2020-10-05

## 2020-10-05 VITALS
WEIGHT: 291 LBS | HEART RATE: 43 BPM | RESPIRATION RATE: 18 BRPM | TEMPERATURE: 96.5 F | SYSTOLIC BLOOD PRESSURE: 130 MMHG | DIASTOLIC BLOOD PRESSURE: 70 MMHG | BODY MASS INDEX: 39.47 KG/M2 | OXYGEN SATURATION: 99 %

## 2020-10-05 PROCEDURE — G0157 HHC PT ASSISTANT EA 15: HCPCS

## 2020-10-05 PROCEDURE — G0299 HHS/HOSPICE OF RN EA 15 MIN: HCPCS

## 2020-10-05 NOTE — TELEPHONE ENCOUNTER
Pt's weight down to 291 lbs -- decrease torsemide to 10mg PO daily. Will call 10/9 to FU. Pt concernd about right groin (had cutdown for surgery). He denies redness, pain or swelling.   Call if worsens

## 2020-10-05 NOTE — TELEPHONE ENCOUNTER
Olivia Callejas from  [facility] would like a call back at [phone].  Pt's heart rate was in the 40's

## 2020-10-05 NOTE — TELEPHONE ENCOUNTER
HR 40-50s, BP 130s. Pt not overly symptomatic with bradycardia. Would like to wean amio before reduce coreg d/t HTN/CHF. Reduce amio to 200 mg PO daily, RN will tell pt.

## 2020-10-06 VITALS
SYSTOLIC BLOOD PRESSURE: 108 MMHG | HEART RATE: 48 BPM | TEMPERATURE: 98.5 F | DIASTOLIC BLOOD PRESSURE: 62 MMHG | OXYGEN SATURATION: 98 % | RESPIRATION RATE: 16 BRPM

## 2020-10-07 ENCOUNTER — HOME CARE VISIT (OUTPATIENT)
Dept: SCHEDULING | Facility: HOME HEALTH | Age: 70
End: 2020-10-07
Payer: MEDICARE

## 2020-10-07 VITALS
OXYGEN SATURATION: 98 % | WEIGHT: 290 LBS | DIASTOLIC BLOOD PRESSURE: 78 MMHG | RESPIRATION RATE: 20 BRPM | HEART RATE: 44 BPM | BODY MASS INDEX: 39.33 KG/M2 | TEMPERATURE: 97.6 F | SYSTOLIC BLOOD PRESSURE: 110 MMHG

## 2020-10-07 VITALS
SYSTOLIC BLOOD PRESSURE: 106 MMHG | RESPIRATION RATE: 16 BRPM | HEART RATE: 48 BPM | OXYGEN SATURATION: 98 % | TEMPERATURE: 97.5 F | DIASTOLIC BLOOD PRESSURE: 62 MMHG

## 2020-10-07 PROCEDURE — G0300 HHS/HOSPICE OF LPN EA 15 MIN: HCPCS

## 2020-10-08 ENCOUNTER — HOME CARE VISIT (OUTPATIENT)
Dept: SCHEDULING | Facility: HOME HEALTH | Age: 70
End: 2020-10-08
Payer: MEDICARE

## 2020-10-08 PROCEDURE — G0157 HHC PT ASSISTANT EA 15: HCPCS

## 2020-10-09 ENCOUNTER — HOME CARE VISIT (OUTPATIENT)
Dept: SCHEDULING | Facility: HOME HEALTH | Age: 70
End: 2020-10-09
Payer: MEDICARE

## 2020-10-09 ENCOUNTER — TELEPHONE (OUTPATIENT)
Dept: CARDIOLOGY CLINIC | Age: 70
End: 2020-10-09

## 2020-10-09 VITALS
TEMPERATURE: 98.5 F | HEART RATE: 45 BPM | SYSTOLIC BLOOD PRESSURE: 115 MMHG | OXYGEN SATURATION: 96 % | RESPIRATION RATE: 16 BRPM | DIASTOLIC BLOOD PRESSURE: 58 MMHG

## 2020-10-09 PROCEDURE — G0299 HHS/HOSPICE OF RN EA 15 MIN: HCPCS

## 2020-10-09 NOTE — TELEPHONE ENCOUNTER
HR still 38 to low 40s. Pt mildly dizzy with ambulation per [place] RN. BP good 120s. Will stop amio entirely,  Decrease coreg 6.25 mg PO BID. Weight stable at 290, some mild LE edema,  Keep demadex at 10mg PO daily. Will FU with pt and ARIEL RN Next week.

## 2020-10-12 VITALS
TEMPERATURE: 96.6 F | DIASTOLIC BLOOD PRESSURE: 78 MMHG | RESPIRATION RATE: 18 BRPM | OXYGEN SATURATION: 98 % | WEIGHT: 290 LBS | HEART RATE: 39 BPM | SYSTOLIC BLOOD PRESSURE: 120 MMHG | BODY MASS INDEX: 39.33 KG/M2

## 2020-10-13 ENCOUNTER — HOME CARE VISIT (OUTPATIENT)
Dept: SCHEDULING | Facility: HOME HEALTH | Age: 70
End: 2020-10-13
Payer: MEDICARE

## 2020-10-13 VITALS
TEMPERATURE: 98.1 F | RESPIRATION RATE: 16 BRPM | SYSTOLIC BLOOD PRESSURE: 128 MMHG | DIASTOLIC BLOOD PRESSURE: 58 MMHG | HEART RATE: 47 BPM | OXYGEN SATURATION: 97 %

## 2020-10-13 PROCEDURE — G0157 HHC PT ASSISTANT EA 15: HCPCS

## 2020-10-14 ENCOUNTER — HOME CARE VISIT (OUTPATIENT)
Dept: SCHEDULING | Facility: HOME HEALTH | Age: 70
End: 2020-10-14
Payer: MEDICARE

## 2020-10-14 ENCOUNTER — HOME CARE VISIT (OUTPATIENT)
Dept: HOME HEALTH SERVICES | Facility: HOME HEALTH | Age: 70
End: 2020-10-14
Payer: MEDICARE

## 2020-10-14 ENCOUNTER — TELEPHONE (OUTPATIENT)
Dept: CARDIOLOGY CLINIC | Age: 70
End: 2020-10-14

## 2020-10-14 VITALS
OXYGEN SATURATION: 98 % | DIASTOLIC BLOOD PRESSURE: 61 MMHG | SYSTOLIC BLOOD PRESSURE: 118 MMHG | TEMPERATURE: 98.5 F | HEART RATE: 44 BPM | RESPIRATION RATE: 16 BRPM

## 2020-10-14 PROCEDURE — G0299 HHS/HOSPICE OF RN EA 15 MIN: HCPCS

## 2020-10-14 RX ORDER — CARVEDILOL 3.12 MG/1
3.12 TABLET ORAL 2 TIMES DAILY WITH MEALS
Qty: 60 TAB | Refills: 1 | Status: SHIPPED | OUTPATIENT
Start: 2020-10-14 | End: 2020-12-13

## 2020-10-14 NOTE — TELEPHONE ENCOUNTER
Today HR is 44. He continues to be dizzy. Walked a mile with PT yesterday. HR got as high as 88 during the walk. He had to stop several times though due to dizziness/fatigue. BP has been 118/61, 128/58,     Will decrease Coreg to 3.125 BID. New Rx sent. N will review with the patient. She sees him again next week and will call with an update.

## 2020-10-15 ENCOUNTER — HOME CARE VISIT (OUTPATIENT)
Dept: SCHEDULING | Facility: HOME HEALTH | Age: 70
End: 2020-10-15
Payer: MEDICARE

## 2020-10-15 PROCEDURE — G0151 HHCP-SERV OF PT,EA 15 MIN: HCPCS

## 2020-10-16 ENCOUNTER — DOCUMENTATION ONLY (OUTPATIENT)
Dept: CARDIOLOGY CLINIC | Age: 70
End: 2020-10-16

## 2020-10-16 VITALS
SYSTOLIC BLOOD PRESSURE: 128 MMHG | RESPIRATION RATE: 16 BRPM | TEMPERATURE: 97.7 F | HEART RATE: 44 BPM | OXYGEN SATURATION: 98 % | DIASTOLIC BLOOD PRESSURE: 75 MMHG

## 2020-10-19 ENCOUNTER — TELEPHONE (OUTPATIENT)
Dept: CARDIAC REHAB | Age: 70
End: 2020-10-19

## 2020-10-19 NOTE — TELEPHONE ENCOUNTER
10/19/2020 Cardiac Rehab: Called Mr. Marcelle Joe to remind of intake appointment on Wednesday, 10/21/2020. Confirmed appointment with patient. Provided patient with contact information for [facility]. Also, reminded patient to bring a list of current medications, a personal schedule, and to wear comfortable clothes and shoes.  Mik Bartlett

## 2020-10-20 ENCOUNTER — HOME CARE VISIT (OUTPATIENT)
Dept: SCHEDULING | Facility: HOME HEALTH | Age: 70
End: 2020-10-20
Payer: MEDICARE

## 2020-10-20 PROCEDURE — G0299 HHS/HOSPICE OF RN EA 15 MIN: HCPCS

## 2020-10-20 RX ORDER — AMIODARONE HYDROCHLORIDE 200 MG/1
TABLET ORAL
Qty: 42 TAB | Refills: 0 | OUTPATIENT
Start: 2020-10-20

## 2020-10-20 RX ORDER — TORSEMIDE 20 MG/1
10 TABLET ORAL DAILY
Qty: 30 TAB | Refills: 0 | Status: SHIPPED | OUTPATIENT
Start: 2020-10-20 | End: 2021-01-11 | Stop reason: SDUPTHER

## 2020-10-21 ENCOUNTER — HOSPITAL ENCOUNTER (OUTPATIENT)
Dept: CARDIAC REHAB | Age: 70
Discharge: HOME | End: 2020-10-21
Payer: MEDICARE

## 2020-10-21 VITALS — BODY MASS INDEX: 40.14 KG/M2 | WEIGHT: 296.4 LBS | HEIGHT: 72 IN

## 2020-10-21 VITALS
DIASTOLIC BLOOD PRESSURE: 82 MMHG | TEMPERATURE: 98.8 F | RESPIRATION RATE: 16 BRPM | HEART RATE: 49 BPM | OXYGEN SATURATION: 95 % | SYSTOLIC BLOOD PRESSURE: 148 MMHG

## 2020-10-21 PROCEDURE — 93797 PHYS/QHP OP CAR RHAB WO ECG: CPT

## 2020-10-21 PROCEDURE — 93798 PHYS/QHP OP CAR RHAB W/ECG: CPT

## 2020-10-21 NOTE — CARDIO/PULMONARY
Mary Ann Peña  78 y.o. presented to cardiac wellness for orientation and exercise tolerance test today with a primary diagnosis of AVR . EF is 55% . Mary Ann Peña has a history of previous CABG, AVR  HF, . Cardiac risk factors include smoking/ tobacco exposure, dyslipidemia, diabetes mellitus, obesity, hypertension, valvular heart disease and these were reviewed with HIM. Mary Ann Peña lives with wife and adult children live nearby. .   PHQ9, depression score, is 0 and this is considered normal. The result was discussed with patient who affirms score. Patient denied chest pain or SOB during 6 minute walk and was in SR with 1 isolated interpolated pvc. Mary Ann Peña will not attend education class as he enrolled previously and did attend. He will exercise 3 x a week. class once a week and will exercise 2 - 3 times weekly in cardiac wellness. An education manual was declined by the pt. As he has the previous one. Olivier Sanchez RN  10/21/2020

## 2020-10-26 ENCOUNTER — HOSPITAL ENCOUNTER (OUTPATIENT)
Dept: CARDIAC REHAB | Age: 70
Discharge: HOME | End: 2020-10-26
Payer: MEDICARE

## 2020-10-26 VITALS — BODY MASS INDEX: 40.04 KG/M2 | WEIGHT: 295.2 LBS

## 2020-10-26 PROCEDURE — 93798 PHYS/QHP OP CAR RHAB W/ECG: CPT

## 2020-10-27 ENCOUNTER — OFFICE VISIT (OUTPATIENT)
Dept: CARDIOLOGY CLINIC | Age: 70
End: 2020-10-27
Payer: MEDICARE

## 2020-10-27 ENCOUNTER — HOSPITAL ENCOUNTER (OUTPATIENT)
Dept: CARDIAC REHAB | Age: 70
Discharge: HOME | End: 2020-10-27
Payer: MEDICARE

## 2020-10-27 VITALS
HEART RATE: 55 BPM | DIASTOLIC BLOOD PRESSURE: 78 MMHG | RESPIRATION RATE: 20 BRPM | SYSTOLIC BLOOD PRESSURE: 140 MMHG | BODY MASS INDEX: 39.74 KG/M2 | HEIGHT: 72 IN | OXYGEN SATURATION: 97 % | TEMPERATURE: 98.1 F

## 2020-10-27 VITALS — WEIGHT: 293 LBS | BODY MASS INDEX: 39.74 KG/M2

## 2020-10-27 DIAGNOSIS — Z95.1 S/P CABG X 2: Primary | ICD-10-CM

## 2020-10-27 DIAGNOSIS — Z95.2 S/P AVR (AORTIC VALVE REPLACEMENT): ICD-10-CM

## 2020-10-27 PROCEDURE — 93798 PHYS/QHP OP CAR RHAB W/ECG: CPT

## 2020-10-27 PROCEDURE — 99024 POSTOP FOLLOW-UP VISIT: CPT | Performed by: THORACIC SURGERY (CARDIOTHORACIC VASCULAR SURGERY)

## 2020-10-27 RX ORDER — AMLODIPINE BESYLATE 10 MG/1
10 TABLET ORAL DAILY
Qty: 30 TAB | Refills: 1 | Status: SHIPPED | OUTPATIENT
Start: 2020-10-27 | End: 2020-12-26

## 2020-10-27 NOTE — PROGRESS NOTES
Patient: Lizbeth Hernandez   Age: 78 y.o. Patient Care Team:  Vitaly Priest MD as PCP - General (Internal Medicine)  Manisha Vo MD as Referring Provider (Cardiology)    Diagnosis: The primary encounter diagnosis was S/P CABG x 2. A diagnosis of S/P AVR (aortic valve replacement) was also pertinent to this visit. Problem List:   Patient Active Problem List   Diagnosis Code    DM (diabetes mellitus) ([facility] [address] E11.9    Hyperlipidemia E78.5    Kidney stone N20.0    CKD (chronic kidney disease), stage III N18.30    Hypovitaminosis D E55.9    S/P CABG x 2 Z95.1    S/P AVR (aortic valve replacement) Z95.2    Hypotension I95.9    LBP (low back pain) M54.5    Type 2 diabetes mellitus without complication, with long-term current use of insulin (HCC) E11.9, Z79.4    Type 2 diabetes with nephropathy (HCC) E11.21    Severe obesity (BMI 35.0-39. 9) with comorbidity ([address] [facility] E66.01    Nonadherence to medical treatment Z91.19    Aortic valve stenosis, severe I35.0    Aortic stenosis I35.0    S/P AVR (aortic valve replacement) and aortoplasty Z95.2        Date of Surgery: 9/22/20    Surgery: S/p CABG/AVR    HPI: Pt here for routine postop appt. Pt reports he is doing well since surgery. He has had little pain since then. No palpitations, SOB/THOMPSON. Edema has mostly resolved. He did have a bit of discomfort from R groin incision but it is improving. He had also called our office several times due to complaints of bradycardia associated with dizziness. This has improved since decreasing his coreg dose to 3.125 mg. Current Medications:   Current Outpatient Medications   Medication Sig Dispense Refill    amLODIPine (NORVASC) 10 mg tablet Take 1 Tab by mouth daily for 60 days. 30 Tab 1    torsemide (DEMADEX) 20 mg tablet Take 0.5 Tabs by mouth daily.  Indications: accumulation of fluid resulting from chronic heart failure 30 Tab 0    OTHER Calcium/Magnesium/Zinc one pill daily      tamsulosin (FLOMAX) 0.4 mg capsule Take 1 Cap by mouth daily. 90 Cap 3    spironolactone (ALDACTONE) 25 mg tablet Take 1 Tab by mouth daily. Indications: accumulation of fluid resulting from chronic heart failure 90 Tab 3    carvediloL (COREG) 3.125 mg tablet Take 1 Tab by mouth two (2) times daily (with meals) for 60 days. Indications: chronic heart failure 60 Tab 1    allopurinoL (ZYLOPRIM) 100 mg tablet Take 0.5 Tabs by mouth daily. (Dose = 0.5 x 100 mg tablet) (Patient taking differently: Take 100 mg by mouth every other day. (Dose = 0.5 x 100 mg tablet)) 45 Tab 0    atorvastatin (Lipitor) 40 mg tablet Take 1 Tab by mouth daily. 90 Tab 0    aspirin (ASPIRIN) 325 mg tablet Take 1 Tab by mouth daily. 90 Tab 1    insulin lispro protamine/insulin lispro (HumaLOG Mix 75-25,U-100,Insuln) 100 unit/mL (75-25) injection 25 Units by SubCUTAneous route Before breakfast and dinner. Indications: type 2 diabetes mellitus (Patient taking differently: 32 Units by SubCUTAneous route Before breakfast and dinner. Indications: type 2 diabetes mellitus) 8 Vial 5    pioglitazone (ACTOS) 15 mg tablet Take 1 Tab by mouth daily. 90 Tab 0    Insulin Syringe-Needle U-100 (BD INSULIN SYRINGE ULTRA-FINE) 1 mL 30 gauge x 1/2\" syrg Use twice a day. Dx: E11.9 300 Syringe 4    ascorbic acid, vitamin C, (VITAMIN C) 1,000 mg tablet Take 2,000 mg by mouth daily.  turmeric root extract 500 mg cap Take 1 Cap by mouth daily.  cholecalciferol, vitamin D3, 2,000 unit tab Take 5,000 Units by mouth daily.  b complex vitamins (B COMPLEX 1) tablet Take 1 Tab by mouth daily. Vitals: Blood pressure (!) 140/78, pulse (!) 55, temperature 98.1 °F (36.7 °C), temperature source Oral, resp. rate 20, height 6' (1.829 m), SpO2 97 %. Allergies: is allergic to iodinated contrast media and nafcillin. Physical Exam:  Wounds: healed.  R groin healing/softening up, consistent with normal healing    Lungs: clear to auscultation bilaterally    Heart: regular rate and rhythm, S1, S2 normal, + murmur, no click, rub or gallop    Extremities: trace edema to L leg, none to R    Assessment/Plan:   1. Mod to severe bioprosthetic AV stenosis s/p redo AVR: 325 mg ASA. Post-op TTE completed 9/27.       2.  CAD S/p CABG 2014: on asa, statin, metoprolol changed to coreg by Dr. Gomez Charlton     3. DM2: On insulin 75/25, actos. Hgba1c of 8.6.       4. Chronic Diastolic CHF: Now on coreg, aldactone, torsemide       5. HTN: on coreg. Increase norvasc to 10 mg daily.      6. HLD: on statin     7. ANDRY: uses cpap     8. CKD3: cont current mds     9. BPH: Cont flomax today.     10. A flutter: Resolved, acute postop    Pt is ready to begin cardiac rehab.      Rehab - ready to begin  Walking: yes  Glucometer: yes  Antibiotic card for valves: yes

## 2020-10-28 ENCOUNTER — HOSPITAL ENCOUNTER (OUTPATIENT)
Dept: CARDIAC REHAB | Age: 70
Discharge: HOME | End: 2020-10-28
Payer: MEDICARE

## 2020-10-28 VITALS — BODY MASS INDEX: 39.87 KG/M2 | WEIGHT: 294 LBS

## 2020-10-28 PROCEDURE — 93798 PHYS/QHP OP CAR RHAB W/ECG: CPT

## 2020-11-02 ENCOUNTER — APPOINTMENT (OUTPATIENT)
Dept: CARDIAC REHAB | Age: 70
End: 2020-11-02
Payer: MEDICARE

## 2020-11-03 ENCOUNTER — HOSPITAL ENCOUNTER (OUTPATIENT)
Dept: CARDIAC REHAB | Age: 70
Discharge: HOME | End: 2020-11-03
Payer: MEDICARE

## 2020-11-03 VITALS — BODY MASS INDEX: 40.14 KG/M2 | WEIGHT: 296 LBS

## 2020-11-03 PROCEDURE — 93798 PHYS/QHP OP CAR RHAB W/ECG: CPT

## 2020-11-04 ENCOUNTER — HOSPITAL ENCOUNTER (OUTPATIENT)
Dept: CARDIAC REHAB | Age: 70
Discharge: HOME | End: 2020-11-04
Payer: MEDICARE

## 2020-11-04 VITALS — BODY MASS INDEX: 39.6 KG/M2 | WEIGHT: 292 LBS

## 2020-11-04 PROCEDURE — 93798 PHYS/QHP OP CAR RHAB W/ECG: CPT

## 2020-11-09 ENCOUNTER — HOSPITAL ENCOUNTER (OUTPATIENT)
Dept: CARDIAC REHAB | Age: 70
Discharge: HOME | End: 2020-11-09
Payer: MEDICARE

## 2020-11-09 VITALS — WEIGHT: 294 LBS | BODY MASS INDEX: 39.87 KG/M2

## 2020-11-09 PROCEDURE — 93798 PHYS/QHP OP CAR RHAB W/ECG: CPT

## 2020-11-10 ENCOUNTER — HOSPITAL ENCOUNTER (OUTPATIENT)
Dept: CARDIAC REHAB | Age: 70
Discharge: HOME | End: 2020-11-10
Payer: MEDICARE

## 2020-11-10 VITALS — BODY MASS INDEX: 40.01 KG/M2 | WEIGHT: 295 LBS

## 2020-11-10 PROCEDURE — 93798 PHYS/QHP OP CAR RHAB W/ECG: CPT

## 2020-11-11 ENCOUNTER — HOSPITAL ENCOUNTER (OUTPATIENT)
Dept: CARDIAC REHAB | Age: 70
Discharge: HOME | End: 2020-11-11
Payer: MEDICARE

## 2020-11-11 VITALS — WEIGHT: 294 LBS | BODY MASS INDEX: 39.87 KG/M2

## 2020-11-11 PROCEDURE — 93798 PHYS/QHP OP CAR RHAB W/ECG: CPT

## 2020-11-17 ENCOUNTER — HOSPITAL ENCOUNTER (OUTPATIENT)
Dept: CARDIAC REHAB | Age: 70
Discharge: HOME | End: 2020-11-17
Payer: MEDICARE

## 2020-11-17 VITALS — WEIGHT: 298 LBS | BODY MASS INDEX: 40.42 KG/M2

## 2020-11-17 PROCEDURE — 93798 PHYS/QHP OP CAR RHAB W/ECG: CPT

## 2020-11-18 ENCOUNTER — HOSPITAL ENCOUNTER (OUTPATIENT)
Dept: CARDIAC REHAB | Age: 70
Discharge: HOME | End: 2020-11-18
Payer: MEDICARE

## 2020-11-18 VITALS — BODY MASS INDEX: 40.28 KG/M2 | WEIGHT: 297 LBS

## 2020-11-18 PROCEDURE — 93798 PHYS/QHP OP CAR RHAB W/ECG: CPT

## 2020-11-23 ENCOUNTER — HOSPITAL ENCOUNTER (OUTPATIENT)
Dept: CARDIAC REHAB | Age: 70
Discharge: HOME | End: 2020-11-23
Payer: MEDICARE

## 2020-11-23 VITALS — WEIGHT: 298 LBS | BODY MASS INDEX: 40.42 KG/M2

## 2020-11-23 PROCEDURE — 93798 PHYS/QHP OP CAR RHAB W/ECG: CPT

## 2020-11-24 ENCOUNTER — APPOINTMENT (OUTPATIENT)
Dept: CARDIAC REHAB | Age: 70
End: 2020-11-24
Payer: MEDICARE

## 2020-11-24 ENCOUNTER — TELEPHONE (OUTPATIENT)
Dept: CARDIAC REHAB | Age: 70
End: 2020-11-24

## 2020-11-24 NOTE — TELEPHONE ENCOUNTER
11/24/2020 Cardiac Wellness: Mr. Nilsa Benitez called, left voicemail message, to cancel today's appointment d/t tooth broke in half, he is going to dentist (swollen face). Will return for next appointment.  Yaneli Mac

## 2020-11-24 NOTE — TELEPHONE ENCOUNTER
11/24/2020 Cardiac Wellness: Mr. Manisha Banda called to cancel tomorrow's appointment since he had to see oral surgeon today to pull other part of broken tooth out. Will return for next appointment.  Nasreen Suarez

## 2020-11-25 ENCOUNTER — APPOINTMENT (OUTPATIENT)
Dept: CARDIAC REHAB | Age: 70
End: 2020-11-25
Payer: MEDICARE

## 2020-11-30 ENCOUNTER — HOSPITAL ENCOUNTER (OUTPATIENT)
Dept: CARDIAC REHAB | Age: 70
Discharge: HOME | End: 2020-11-30
Payer: MEDICARE

## 2020-11-30 VITALS — WEIGHT: 297 LBS | BODY MASS INDEX: 40.28 KG/M2

## 2020-11-30 PROCEDURE — 93798 PHYS/QHP OP CAR RHAB W/ECG: CPT

## 2020-12-01 ENCOUNTER — HOSPITAL ENCOUNTER (OUTPATIENT)
Dept: CARDIAC REHAB | Age: 70
Discharge: HOME | End: 2020-12-01
Payer: MEDICARE

## 2020-12-01 VITALS — WEIGHT: 297 LBS | BODY MASS INDEX: 40.28 KG/M2

## 2020-12-01 PROCEDURE — 93798 PHYS/QHP OP CAR RHAB W/ECG: CPT

## 2020-12-02 ENCOUNTER — HOSPITAL ENCOUNTER (OUTPATIENT)
Dept: CARDIAC REHAB | Age: 70
Discharge: HOME | End: 2020-12-02
Payer: MEDICARE

## 2020-12-02 VITALS — BODY MASS INDEX: 40.01 KG/M2 | WEIGHT: 295 LBS

## 2020-12-02 PROCEDURE — 93798 PHYS/QHP OP CAR RHAB W/ECG: CPT

## 2020-12-07 ENCOUNTER — HOSPITAL ENCOUNTER (OUTPATIENT)
Dept: CARDIAC REHAB | Age: 70
Discharge: HOME | End: 2020-12-07
Payer: MEDICARE

## 2020-12-07 VITALS — WEIGHT: 295 LBS | BODY MASS INDEX: 40.01 KG/M2

## 2020-12-07 PROCEDURE — 93798 PHYS/QHP OP CAR RHAB W/ECG: CPT

## 2020-12-08 ENCOUNTER — HOSPITAL ENCOUNTER (OUTPATIENT)
Dept: CARDIAC REHAB | Age: 70
Discharge: HOME | End: 2020-12-08
Payer: MEDICARE

## 2020-12-08 VITALS — BODY MASS INDEX: 40.14 KG/M2 | WEIGHT: 296 LBS

## 2020-12-08 PROCEDURE — 93798 PHYS/QHP OP CAR RHAB W/ECG: CPT

## 2020-12-09 ENCOUNTER — HOSPITAL ENCOUNTER (OUTPATIENT)
Dept: CARDIAC REHAB | Age: 70
Discharge: HOME | End: 2020-12-09
Payer: MEDICARE

## 2020-12-09 VITALS — BODY MASS INDEX: 40.14 KG/M2 | WEIGHT: 296 LBS

## 2020-12-09 PROCEDURE — 93798 PHYS/QHP OP CAR RHAB W/ECG: CPT

## 2020-12-14 ENCOUNTER — TELEPHONE (OUTPATIENT)
Dept: CARDIAC REHAB | Age: 70
End: 2020-12-14

## 2020-12-14 ENCOUNTER — APPOINTMENT (OUTPATIENT)
Dept: CARDIAC REHAB | Age: 70
End: 2020-12-14
Payer: MEDICARE

## 2020-12-14 NOTE — TELEPHONE ENCOUNTER
12/14/2020 Cardiac Wellness: Mr. aIn Montes called, left voicemail message, to cancel today's appointment d/t having a fever of 102. Will return for next appointment.  Kana Bear

## 2020-12-15 ENCOUNTER — APPOINTMENT (OUTPATIENT)
Dept: CARDIAC REHAB | Age: 70
End: 2020-12-15
Payer: MEDICARE

## 2020-12-16 ENCOUNTER — TELEPHONE (OUTPATIENT)
Dept: CARDIAC REHAB | Age: 70
End: 2020-12-16

## 2020-12-16 ENCOUNTER — APPOINTMENT (OUTPATIENT)
Dept: CARDIAC REHAB | Age: 70
End: 2020-12-16
Payer: MEDICARE

## 2020-12-21 ENCOUNTER — HOSPITAL ENCOUNTER (OUTPATIENT)
Dept: CARDIAC REHAB | Age: 70
Discharge: HOME | End: 2020-12-21
Payer: MEDICARE

## 2020-12-21 VITALS — BODY MASS INDEX: 40.14 KG/M2 | WEIGHT: 296 LBS

## 2020-12-21 PROCEDURE — 93798 PHYS/QHP OP CAR RHAB W/ECG: CPT

## 2020-12-22 ENCOUNTER — HOSPITAL ENCOUNTER (OUTPATIENT)
Dept: CARDIAC REHAB | Age: 70
Discharge: HOME | End: 2020-12-22
Payer: MEDICARE

## 2020-12-22 VITALS — WEIGHT: 294 LBS | BODY MASS INDEX: 39.87 KG/M2

## 2020-12-22 PROCEDURE — 93798 PHYS/QHP OP CAR RHAB W/ECG: CPT

## 2020-12-23 ENCOUNTER — HOSPITAL ENCOUNTER (OUTPATIENT)
Dept: CARDIAC REHAB | Age: 70
Discharge: HOME | End: 2020-12-23
Payer: MEDICARE

## 2020-12-23 VITALS — BODY MASS INDEX: 39.47 KG/M2 | WEIGHT: 291 LBS

## 2020-12-23 PROCEDURE — 93798 PHYS/QHP OP CAR RHAB W/ECG: CPT

## 2020-12-28 ENCOUNTER — HOSPITAL ENCOUNTER (OUTPATIENT)
Dept: CARDIAC REHAB | Age: 70
Discharge: HOME | End: 2020-12-28
Payer: MEDICARE

## 2020-12-28 VITALS — WEIGHT: 296 LBS | BODY MASS INDEX: 40.14 KG/M2

## 2020-12-28 PROCEDURE — 93798 PHYS/QHP OP CAR RHAB W/ECG: CPT

## 2020-12-29 ENCOUNTER — APPOINTMENT (OUTPATIENT)
Dept: CARDIAC REHAB | Age: 70
End: 2020-12-29
Payer: MEDICARE

## 2020-12-30 ENCOUNTER — HOSPITAL ENCOUNTER (OUTPATIENT)
Dept: CARDIAC REHAB | Age: 70
Discharge: HOME | End: 2020-12-30
Payer: MEDICARE

## 2020-12-30 VITALS — BODY MASS INDEX: 40.01 KG/M2 | WEIGHT: 295 LBS

## 2020-12-30 PROCEDURE — 93798 PHYS/QHP OP CAR RHAB W/ECG: CPT

## 2021-01-04 ENCOUNTER — HOSPITAL ENCOUNTER (OUTPATIENT)
Dept: CARDIAC REHAB | Age: 71
Discharge: HOME | End: 2021-01-04
Payer: MEDICARE

## 2021-01-04 VITALS — BODY MASS INDEX: 40.14 KG/M2 | WEIGHT: 296 LBS

## 2021-01-04 PROCEDURE — 93798 PHYS/QHP OP CAR RHAB W/ECG: CPT

## 2021-01-04 NOTE — CARDIO/PULMONARY
The following note was faxed to Dr. Velma Bliss. Cari Hobson (s/p AVR 09/22/2020) attends cardiac rehab. His BP has been ranging 160s/80s to 180s/80s at rest, post exercise they range from 140s/80s to 160s/80s for the last several visits. He is asymptomatic and his heart rhythm usually is SB/SR/ST with rare PVCs. He is taking Norvasc and Coreg as prescribed. I wanted you to be aware of this increase in his BP.

## 2021-01-05 ENCOUNTER — HOSPITAL ENCOUNTER (OUTPATIENT)
Dept: CARDIAC REHAB | Age: 71
Discharge: HOME | End: 2021-01-05
Payer: MEDICARE

## 2021-01-05 VITALS — WEIGHT: 295 LBS | BODY MASS INDEX: 40.01 KG/M2

## 2021-01-05 PROCEDURE — 93798 PHYS/QHP OP CAR RHAB W/ECG: CPT

## 2021-01-06 ENCOUNTER — HOSPITAL ENCOUNTER (OUTPATIENT)
Dept: CARDIAC REHAB | Age: 71
Discharge: HOME | End: 2021-01-06
Payer: MEDICARE

## 2021-01-06 VITALS — WEIGHT: 297 LBS | BODY MASS INDEX: 40.28 KG/M2

## 2021-01-06 PROCEDURE — 93798 PHYS/QHP OP CAR RHAB W/ECG: CPT

## 2021-01-11 ENCOUNTER — HOSPITAL ENCOUNTER (OUTPATIENT)
Dept: CARDIAC REHAB | Age: 71
Discharge: HOME | End: 2021-01-11
Payer: MEDICARE

## 2021-01-11 VITALS — BODY MASS INDEX: 40.14 KG/M2 | WEIGHT: 296 LBS

## 2021-01-11 PROCEDURE — 93798 PHYS/QHP OP CAR RHAB W/ECG: CPT

## 2021-01-12 ENCOUNTER — HOSPITAL ENCOUNTER (OUTPATIENT)
Dept: CARDIAC REHAB | Age: 71
Discharge: HOME | End: 2021-01-12
Payer: MEDICARE

## 2021-01-12 VITALS — WEIGHT: 294 LBS | BODY MASS INDEX: 39.87 KG/M2

## 2021-01-12 PROCEDURE — 93798 PHYS/QHP OP CAR RHAB W/ECG: CPT

## 2021-01-13 ENCOUNTER — HOSPITAL ENCOUNTER (OUTPATIENT)
Dept: CARDIAC REHAB | Age: 71
Discharge: HOME | End: 2021-01-13
Payer: MEDICARE

## 2021-01-13 VITALS — BODY MASS INDEX: 40.01 KG/M2 | WEIGHT: 295 LBS

## 2021-01-13 PROCEDURE — 93798 PHYS/QHP OP CAR RHAB W/ECG: CPT

## 2021-01-13 RX ORDER — ASPIRIN 81 MG/1
81 TABLET ORAL DAILY
COMMUNITY

## 2021-01-13 NOTE — CARDIO/PULMONARY
Nadya Handy 78 y.o. With diagnosis of AVR attended phase II cardiac rehab for 10/21/20 to 1/13/21. He completed 30 sessions. He will see his cardiologist, Dr. Katrina Cummings next week concerining his BP control and medications. He will exericse daily walking 30 -40 mins. Per day and add weights 3 times per week. He improved on his post test, lost 1.4 lbs. He is aware of the need to lose weight, continue exercising and control his blood sugars. Sharath Bain RN 
1/13/2021

## 2021-03-23 ENCOUNTER — TRANSCRIBE ORDER (OUTPATIENT)
Dept: SCHEDULING | Age: 71
End: 2021-03-23

## 2021-03-23 DIAGNOSIS — G89.29 CHRONIC LEFT SHOULDER PAIN: Primary | ICD-10-CM

## 2021-03-23 DIAGNOSIS — M75.42 SHOULDER IMPINGEMENT, LEFT: ICD-10-CM

## 2021-03-23 DIAGNOSIS — M25.512 CHRONIC LEFT SHOULDER PAIN: Primary | ICD-10-CM

## 2021-03-23 DIAGNOSIS — M75.112 PARTIAL NONTRAUMATIC TEAR OF LEFT ROTATOR CUFF: ICD-10-CM

## 2021-03-31 ENCOUNTER — HOSPITAL ENCOUNTER (OUTPATIENT)
Dept: GENERAL RADIOLOGY | Age: 71
Discharge: HOME | End: 2021-03-31
Attending: ORTHOPAEDIC SURGERY
Payer: MEDICARE

## 2021-03-31 DIAGNOSIS — M25.512 CHRONIC LEFT SHOULDER PAIN: ICD-10-CM

## 2021-03-31 DIAGNOSIS — G89.29 CHRONIC LEFT SHOULDER PAIN: ICD-10-CM

## 2021-03-31 DIAGNOSIS — M75.112 PARTIAL NONTRAUMATIC TEAR OF LEFT ROTATOR CUFF: ICD-10-CM

## 2021-03-31 DIAGNOSIS — M75.42 SHOULDER IMPINGEMENT, LEFT: ICD-10-CM

## 2021-03-31 PROCEDURE — 20610 DRAIN/INJ JOINT/BURSA W/O US: CPT

## 2021-03-31 PROCEDURE — 74011250636 HC RX REV CODE- 250/636: Performed by: RADIOLOGY

## 2021-03-31 PROCEDURE — 74011000250 HC RX REV CODE- 250

## 2021-03-31 PROCEDURE — 74011000250 HC RX REV CODE- 250: Performed by: RADIOLOGY

## 2021-03-31 RX ORDER — SODIUM BICARBONATE 42 MG/ML
1 INJECTION, SOLUTION INTRAVENOUS
Status: COMPLETED | OUTPATIENT
Start: 2021-03-31 | End: 2021-03-31

## 2021-03-31 RX ORDER — TRIAMCINOLONE ACETONIDE 40 MG/ML
40 INJECTION, SUSPENSION INTRA-ARTICULAR; INTRAMUSCULAR
Status: COMPLETED | OUTPATIENT
Start: 2021-03-31 | End: 2021-03-31

## 2021-03-31 RX ORDER — LIDOCAINE HYDROCHLORIDE 10 MG/ML
20 INJECTION INFILTRATION; PERINEURAL
Status: ACTIVE | OUTPATIENT
Start: 2021-03-31 | End: 2021-03-31

## 2021-03-31 RX ORDER — LIDOCAINE HYDROCHLORIDE 10 MG/ML
INJECTION, SOLUTION EPIDURAL; INFILTRATION; INTRACAUDAL; PERINEURAL
Status: COMPLETED
Start: 2021-03-31 | End: 2021-03-31

## 2021-03-31 RX ORDER — BUPIVACAINE HYDROCHLORIDE 5 MG/ML
5 INJECTION, SOLUTION EPIDURAL; INTRACAUDAL
Status: COMPLETED | OUTPATIENT
Start: 2021-03-31 | End: 2021-03-31

## 2021-03-31 RX ADMIN — LIDOCAINE HYDROCHLORIDE 10 ML: 10 INJECTION, SOLUTION EPIDURAL; INFILTRATION; INTRACAUDAL; PERINEURAL at 09:04

## 2021-03-31 RX ADMIN — BUPIVACAINE HYDROCHLORIDE 10 MG: 5 INJECTION, SOLUTION EPIDURAL; INTRACAUDAL; PERINEURAL at 09:04

## 2021-03-31 RX ADMIN — TRIAMCINOLONE ACETONIDE 40 MG: 40 INJECTION, SUSPENSION INTRA-ARTICULAR; INTRAMUSCULAR at 09:05

## 2021-03-31 RX ADMIN — SODIUM BICARBONATE 4 ML: 42 INJECTION, SOLUTION INTRAVENOUS at 09:05

## 2021-10-11 ENCOUNTER — APPOINTMENT (OUTPATIENT)
Dept: GENERAL RADIOLOGY | Age: 71
End: 2021-10-11
Attending: NURSE PRACTITIONER
Payer: MEDICARE

## 2021-10-11 ENCOUNTER — HOSPITAL ENCOUNTER (EMERGENCY)
Age: 71
Discharge: HOME | End: 2021-10-11
Attending: EMERGENCY MEDICINE
Payer: MEDICARE

## 2021-10-11 VITALS
DIASTOLIC BLOOD PRESSURE: 69 MMHG | OXYGEN SATURATION: 94 % | SYSTOLIC BLOOD PRESSURE: 132 MMHG | RESPIRATION RATE: 22 BRPM | TEMPERATURE: 97.9 F | HEART RATE: 104 BPM

## 2021-10-11 DIAGNOSIS — I48.3 TYPICAL ATRIAL FLUTTER (HCC): ICD-10-CM

## 2021-10-11 DIAGNOSIS — J12.82 PNEUMONIA DUE TO COVID-19 VIRUS: Primary | ICD-10-CM

## 2021-10-11 DIAGNOSIS — U07.1 PNEUMONIA DUE TO COVID-19 VIRUS: Primary | ICD-10-CM

## 2021-10-11 LAB
BASOPHILS # BLD: 0 K/UL (ref 0–0.1)
BASOPHILS NFR BLD: 0 % (ref 0–1)
DIFFERENTIAL METHOD BLD: ABNORMAL
EOSINOPHIL # BLD: 0.1 K/UL (ref 0–0.4)
EOSINOPHIL NFR BLD: 1 % (ref 0–7)
ERYTHROCYTE [DISTWIDTH] IN BLOOD BY AUTOMATED COUNT: 15 % (ref 11.5–14.5)
HCT VFR BLD AUTO: 49.3 % (ref 36.6–50.3)
HGB BLD-MCNC: 17.3 G/DL (ref 12.1–17)
IMM GRANULOCYTES # BLD AUTO: 0 K/UL (ref 0–0.04)
IMM GRANULOCYTES NFR BLD AUTO: 1 % (ref 0–0.5)
LYMPHOCYTES # BLD: 1.7 K/UL (ref 0.8–3.5)
LYMPHOCYTES NFR BLD: 23 % (ref 12–49)
MCH RBC QN AUTO: 30.8 PG (ref 26–34)
MCHC RBC AUTO-ENTMCNC: 35.1 G/DL (ref 30–36.5)
MCV RBC AUTO: 87.9 FL (ref 80–99)
MONOCYTES # BLD: 1 K/UL (ref 0–1)
MONOCYTES NFR BLD: 13 % (ref 5–13)
NEUTS SEG # BLD: 4.6 K/UL (ref 1.8–8)
NEUTS SEG NFR BLD: 62 % (ref 32–75)
NRBC # BLD: 0 K/UL (ref 0–0.01)
NRBC BLD-RTO: 0 PER 100 WBC
PLATELET # BLD AUTO: 182 K/UL (ref 150–400)
PMV BLD AUTO: 11.1 FL (ref 8.9–12.9)
RBC # BLD AUTO: 5.61 M/UL (ref 4.1–5.7)
TROPONIN-HIGH SENSITIVITY: 24 NG/L (ref 0–76)
TROPONIN-HIGH SENSITIVITY: 25 NG/L (ref 0–76)
WBC # BLD AUTO: 7.4 K/UL (ref 4.1–11.1)

## 2021-10-11 PROCEDURE — 94640 AIRWAY INHALATION TREATMENT: CPT

## 2021-10-11 PROCEDURE — 74011250636 HC RX REV CODE- 250/636: Performed by: NURSE PRACTITIONER

## 2021-10-11 PROCEDURE — 99284 EMERGENCY DEPT VISIT MOD MDM: CPT

## 2021-10-11 PROCEDURE — 36415 COLL VENOUS BLD VENIPUNCTURE: CPT

## 2021-10-11 PROCEDURE — 84484 ASSAY OF TROPONIN QUANT: CPT

## 2021-10-11 PROCEDURE — 74011250637 HC RX REV CODE- 250/637: Performed by: NURSE PRACTITIONER

## 2021-10-11 PROCEDURE — 85025 COMPLETE CBC W/AUTO DIFF WBC: CPT

## 2021-10-11 PROCEDURE — 71045 X-RAY EXAM CHEST 1 VIEW: CPT

## 2021-10-11 PROCEDURE — 99285 EMERGENCY DEPT VISIT HI MDM: CPT

## 2021-10-11 PROCEDURE — 96374 THER/PROPH/DIAG INJ IV PUSH: CPT

## 2021-10-11 PROCEDURE — 93005 ELECTROCARDIOGRAM TRACING: CPT

## 2021-10-11 RX ORDER — PREDNISONE 50 MG/1
50 TABLET ORAL DAILY
Qty: 3 TABLET | Refills: 0 | Status: SHIPPED | OUTPATIENT
Start: 2021-10-11 | End: 2021-10-14

## 2021-10-11 RX ORDER — DEXAMETHASONE SODIUM PHOSPHATE 10 MG/ML
10 INJECTION INTRAMUSCULAR; INTRAVENOUS
Status: COMPLETED | OUTPATIENT
Start: 2021-10-11 | End: 2021-10-11

## 2021-10-11 RX ORDER — ALBUTEROL SULFATE 90 UG/1
2 AEROSOL, METERED RESPIRATORY (INHALATION)
Status: COMPLETED | OUTPATIENT
Start: 2021-10-11 | End: 2021-10-11

## 2021-10-11 RX ADMIN — DEXAMETHASONE SODIUM PHOSPHATE 10 MG: 10 INJECTION INTRAMUSCULAR; INTRAVENOUS at 20:22

## 2021-10-11 RX ADMIN — ALBUTEROL SULFATE 2 PUFF: 90 AEROSOL, METERED RESPIRATORY (INHALATION) at 08:45

## 2021-10-11 NOTE — ED PROVIDER NOTES
HPI     Irma Barnhart is a 73 y.o. male with Hx of CAD, BPH, CHF, CKD, DMII, HLD, HTN, GERD who presents ambulatory to [facility] ED with cc of SOB. Reports dx w/ COVID last Tuesday; first COVID type symptom was Saturday before last Tuesday. Reports increased SOB and \"needing to take deep breaths\" in the last few days. Reports genrealized weakness, fatigue as associative symptoms. Denies chest pain. Last fever was couple days ago. Denies N/V/D, urinary changes, LE swelling, body aches, HA. Received Regeneron infusion- last Wed/ Thursday. Has not had steroids or inhaler. Denies taking any other medications for management of s/sx. PCP: Tori Urbina MD    There are no other complaints, changes or physical findings at this time.         Past Medical History:   Diagnosis Date    BPH (benign prostatic hypertrophy)     CAD (coronary artery disease)     CHF NYHA class III (symptoms with mildly strenuous activities) ([facility] [address] 10/24/2014    Chronic kidney disease     diabetic kidneys    CKD (chronic kidney disease), stage III 4/2/2014    Coagulation disorder (HCC)     PLAVIX    DM (diabetes mellitus) ([facility] [address] 7/11/2011    Endocarditis of aortic valve 10/6/2014    GERD (gastroesophageal reflux disease)     HTN (hypertension) 7/11/2011    Hyperlipidemia 7/11/2011    Hypovitaminosis D 4/16/2014    Kidney stone 7/11/2011       Past Surgical History:   Procedure Laterality Date    CARDIAC SURG PROCEDURE UNLIST  10/2014    CABG x 2 & AVR    HX CHOLECYSTECTOMY  1989    HX HEENT      tonsils & adnoids    HX HEENT      wisdom teeth    HX OTHER SURGICAL      dilation of esophagus    HX OTHER SURGICAL      fistulectomy    HX TOTAL COLECTOMY  1989    diverticulits         Family History:   Problem Relation Age of Onset    Cancer Father         UNKNOWN PRIMARY    Heart Attack Mother     Diabetes Mother     Asthma Mother     Heart Disease Mother     Elevated Lipids Mother     Thyroid Disease Sister  Anesth Problems Neg Hx        Social History     Socioeconomic History    Marital status:      Spouse name: Not on file    Number of children: Not on file    Years of education: Not on file    Highest education level: Not on file   Occupational History    Not on file   Tobacco Use    Smoking status: Former Smoker     Packs/day: 4.00     Years: 30.00     Pack years: 120.00     Types: Cigarettes     Quit date: 2000     Years since quittin.7    Smokeless tobacco: Former User   Substance and Sexual Activity    Alcohol use: Yes     Comment: RARE    Drug use: No    Sexual activity: Not on file   Other Topics Concern    Not on file   Social History Narrative    Not on file     Social Determinants of Health     Financial Resource Strain:     Difficulty of Paying Living Expenses:    Food Insecurity:     Worried About [employer] in the Last Year:     [employer] in the Last Year:    Transportation Needs:     Lack of Transportation (Medical):  Lack of Transportation (Non-Medical):    Physical Activity:     Days of Exercise per Week:     Minutes of Exercise per Session:    Stress:     Feeling of Stress :    Social Connections:     Frequency of Communication with Friends and Family:     Frequency of Social Gatherings with Friends and Family:     Attends Buddhism Services:     Active Member of Clubs or Organizations:     Attends Club or Organization Meetings:     Marital Status:    Intimate Partner Violence:     Fear of Current or Ex-Partner:     Emotionally Abused:     Physically Abused:     Sexually Abused: ALLERGIES: Iodinated contrast media and Nafcillin    Review of Systems   Constitutional: Positive for fatigue and fever. Negative for activity change, appetite change and chills. HENT: Negative for congestion, rhinorrhea and sore throat. Eyes: Negative for visual disturbance. Respiratory: Positive for cough and shortness of breath. Cardiovascular: Negative for chest pain and leg swelling. Gastrointestinal: Negative for abdominal pain, diarrhea, nausea and vomiting. Genitourinary: Negative for dysuria, flank pain, frequency and urgency. Musculoskeletal: Negative for arthralgias, back pain, gait problem, joint swelling, myalgias and neck pain. Skin: Negative for color change and rash. Neurological: Positive for weakness. Negative for dizziness, speech difficulty, light-headedness, numbness and headaches. Psychiatric/Behavioral: Negative for agitation, behavioral problems and confusion. All other systems reviewed and are negative. Vitals:    10/11/21 1739   BP: 132/87   Pulse: 99   Resp: 18   Temp: 97.9 °F (36.6 °C)   SpO2: 96%            Physical Exam  Vitals and nursing note reviewed. Constitutional:       General: He is not in acute distress. Appearance: He is well-developed. HENT:      Head: Normocephalic and atraumatic. Right Ear: External ear normal.      Left Ear: External ear normal.   Eyes:      Conjunctiva/sclera: Conjunctivae normal.      Pupils: Pupils are equal, round, and reactive to light. Cardiovascular:      Rate and Rhythm: Normal rate and regular rhythm. Heart sounds: Normal heart sounds. Pulmonary:      Effort: Pulmonary effort is normal.      Breath sounds: Normal breath sounds. Abdominal:      Palpations: Abdomen is soft. Tenderness: There is no abdominal tenderness. Musculoskeletal:         General: Normal range of motion. Cervical back: Normal range of motion and neck supple. Skin:     General: Skin is warm and dry. Neurological:      Mental Status: He is alert and oriented to person, place, and time. Psychiatric:         Behavior: Behavior normal.         Thought Content:  Thought content normal.         Judgment: Judgment normal.          MDM  Number of Diagnoses or Management Options  Pneumonia due to COVID-19 virus  Typical atrial flutter (HCC)  Diagnosis management comments: DDx: ACS, COVID PNA, CHF exacerbation     PNA present on CXR w/o hypoxia on exertion while in the ER   Trop x2 w/o > 20% change; EKG reviewed by attending MD w/o acute findings   RC Aflutter present during ED stay; pt on anticoagulation- pt stated that he was going to call cardiology in AM for follow up; Started on short steroid course and given inhaler   Reviewed reasons to return to ED        Amount and/or Complexity of Data Reviewed  Clinical lab tests: reviewed and ordered  Tests in the radiology section of CPT®: reviewed and ordered  Tests in the medicine section of CPT®: reviewed and ordered  Review and summarize past medical records: yes  Discuss the patient with other providers: yes Yannick Martin MD )      ED Course as of Oct 12 0259   Mon Oct 11, 2021   1814 ED EKG interpretation:Rhythm: Atrial flutter with 3-1 conduction with left axis deviation with left bundle branch block morphology at a rate of approximately 95. ST segment:  No concerning ST elevations or depressions. This EKG was interpreted by Domingo Coombs MD,ED Provider. [JM]      ED Course User Index  [JM] Ayan Navarro MD       Procedures    Presentation, management, and disposition were discussed with the attending physician, Dr. Susan Farfan, who is in agreement with plan of care. LABORATORY TESTS:  Recent Results (from the past 12 hour(s))   EKG, 12 LEAD, INITIAL    Collection Time: 10/11/21  6:01 PM   Result Value Ref Range    Ventricular Rate 95 BPM    Atrial Rate 285 BPM    QRS Duration 136 ms    Q-T Interval 368 ms    QTC Calculation (Bezet) 462 ms    Calculated P Axis -90 degrees    Calculated R Axis -62 degrees    Calculated T Axis -17 degrees    Diagnosis       Atrial flutter with 3:1 AV conduction  Left axis deviation  Left bundle branch block  Abnormal ECG  When compared with ECG of 28-SEP-2020 14:37,  Atrial flutter has replaced Sinus rhythm  Vent.  rate has increased BY  43 BPM  Minimal criteria for Septal infarct are no longer present  T wave inversion now evident in Inferior leads  T wave inversion now evident in Lateral leads     CBC WITH AUTOMATED DIFF    Collection Time: 10/11/21  6:15 PM   Result Value Ref Range    WBC 7.4 4.1 - 11.1 K/uL    RBC 5.61 4.10 - 5.70 M/uL    HGB 17.3 (H) 12.1 - 17.0 g/dL    HCT 49.3 36.6 - 50.3 %    MCV 87.9 80.0 - 99.0 FL    MCH 30.8 26.0 - 34.0 PG    MCHC 35.1 30.0 - 36.5 g/dL    RDW 15.0 (H) 11.5 - 14.5 %    PLATELET 554 369 - 751 K/uL    MPV 11.1 8.9 - 12.9 FL    NRBC 0.0 0  WBC    ABSOLUTE NRBC 0.00 0.00 - 0.01 K/uL    NEUTROPHILS 62 32 - 75 %    LYMPHOCYTES 23 12 - 49 %    MONOCYTES 13 5 - 13 %    EOSINOPHILS 1 0 - 7 %    BASOPHILS 0 0 - 1 %    IMMATURE GRANULOCYTES 1 (H) 0.0 - 0.5 %    ABS. NEUTROPHILS 4.6 1.8 - 8.0 K/UL    ABS. LYMPHOCYTES 1.7 0.8 - 3.5 K/UL    ABS. MONOCYTES 1.0 0.0 - 1.0 K/UL    ABS. EOSINOPHILS 0.1 0.0 - 0.4 K/UL    ABS. BASOPHILS 0.0 0.0 - 0.1 K/UL    ABS. IMM. GRANS. 0.0 0.00 - 0.04 K/UL    DF AUTOMATED     TROPONIN-HIGH SENSITIVITY    Collection Time: 10/11/21  6:15 PM   Result Value Ref Range    Troponin-High Sensitivity 25 0 - 76 ng/L   TROPONIN-HIGH SENSITIVITY    Collection Time: 10/11/21  8:27 PM   Result Value Ref Range    Troponin-High Sensitivity 24 0 - 76 ng/L       IMAGING RESULTS:  XR CHEST PORT   Final Result   Lingular pneumonia. MEDICATIONS GIVEN:  Medications   dexamethasone (PF) (DECADRON) 10 mg/mL injection 10 mg (10 mg IntraVENous Given 10/11/21 2022)   albuterol (PROVENTIL HFA, VENTOLIN HFA, PROAIR HFA) inhaler 2 Puff (2 Puffs Inhalation Given 10/11/21 0845)       IMPRESSION:  1. Pneumonia due to COVID-19 virus    2. Typical atrial flutter (HCC)        PLAN:  1. Discharge Medication List as of 10/11/2021  9:42 PM      START taking these medications    Details   predniSONE (DELTASONE) 50 mg tablet Take 1 Tablet by mouth daily for 3 days. , Print, Disp-3 Tablet, R-0         CONTINUE these medications which have NOT CHANGED    Details   torsemide (DEMADEX) 20 mg tablet Take 1 Tablet by mouth daily. , Normal, Disp-90 Tablet, R-3      carvediloL (COREG) 12.5 mg tablet Take 1 Tab by mouth two (2) times a day., Normal, Disp-180 Tab, R-3      Insulin Syringe-Needle U-100 (BD Insulin Syringe Ultra-Fine) 1 mL 30 gauge x 1/2\" syrg Use twice a day. Dx: E11.9, Normal, Disp-300 Syringe, R-4      aspirin delayed-release 81 mg tablet Take 81 mg by mouth daily. , Historical Med      allopurinoL (ZYLOPRIM) 100 mg tablet Take 0.5 Tabs by mouth daily. (Dose = 0.5 x 100 mg tablet), Normal, Disp-45 Tab, R-1      apixaban (ELIQUIS PO) Take 5 mg by mouth two (2) times a day., Historical Med      OTHER Calcium/Magnesium/Zinc one pill daily, Historical Med      tamsulosin (FLOMAX) 0.4 mg capsule Take 1 Cap by mouth daily. , Normal, Disp-90 Cap,R-3      spironolactone (ALDACTONE) 25 mg tablet Take 1 Tab by mouth daily. Indications: accumulation of fluid resulting from chronic heart failure, Normal, Disp-90 Tab,R-3      atorvastatin (Lipitor) 40 mg tablet Take 1 Tab by mouth daily. , Normal, Disp-90 Tab,R-0      insulin lispro protamine/insulin lispro (HumaLOG Mix 75-25,U-100,Insuln) 100 unit/mL (75-25) injection 25 Units by SubCUTAneous route Before breakfast and dinner. Indications: type 2 diabetes mellitus, No Print, Disp-8 Vial,R-5      pioglitazone (ACTOS) 15 mg tablet Take 1 Tab by mouth daily. , Normal, Disp-90 Tab, R-0      ascorbic acid, vitamin C, (VITAMIN C) 1,000 mg tablet Take 2,000 mg by mouth daily. , Historical Med      turmeric root extract 500 mg cap Take 1 Cap by mouth daily. , Historical Med      cholecalciferol, vitamin D3, 2,000 unit tab Take 5,000 Units by mouth daily. , Historical Med      b complex vitamins (B COMPLEX 1) tablet Take 1 Tab by mouth daily. , Historical Med           2.    Follow-up Information     Follow up With Specialties Details Why Jeremías Velasco MD Internal Medicine Schedule an appointment as soon as possible for a visit   [address] [place], [place] [address]doug Chris NP Nurse Practitioner  please call to talk with your cardiology provider for follow up as soon as possible [address] [facility]  [phone]      [address], [facility] Emergency Medicine Go to  As needed, If symptoms worsen [address] [facility]  [phone]        3.  Return to ED if worse

## 2021-10-11 NOTE — ED TRIAGE NOTES
TRIAGE NOTE:  Patient arrives ambulatory with c/o covid. Patient reports diagnosed last week, and reports feels worn out and tired, patient reports shortness of breath on exertion. Patient reports had the \"regeneron\" infusion.

## 2021-10-12 ENCOUNTER — PATIENT OUTREACH (OUTPATIENT)
Dept: CASE MANAGEMENT | Age: 71
End: 2021-10-12

## 2021-10-12 LAB
ATRIAL RATE: 285 BPM
CALCULATED P AXIS, ECG09: -90 DEGREES
CALCULATED R AXIS, ECG10: -62 DEGREES
CALCULATED T AXIS, ECG11: -17 DEGREES
DIAGNOSIS, 93000: NORMAL
Q-T INTERVAL, ECG07: 368 MS
QRS DURATION, ECG06: 136 MS
QTC CALCULATION (BEZET), ECG08: 462 MS
VENTRICULAR RATE, ECG03: 95 BPM

## 2021-10-12 NOTE — PROGRESS NOTES
.Patient contacted regarding COVID-19 diagnosis. Discussed COVID-19 related testing which was available at this time. Test results were positive. Patient informed of results, if available? yes. LPN Care Coordinator contacted the patient by telephone to perform post discharge assessment. Call within 2 business days of discharge: Yes Verified name and  with patient as identifiers. Provided introduction to self, and explanation of the CTN/ACM role, and reason for call due to risk factors for infection and/or exposure to COVID-19. Symptoms reviewed with patient who verbalized the following symptoms: no new symptoms and no worsening symptoms      Due to no new or worsening symptoms encounter was not routed to provider for escalation. Discussed follow-up appointments. If no appointment was previously scheduled, appointment scheduling offered:  no. [facility] follow up appointment(s): No future appointments. Non-[facility] follow up appointment(s): NA      Advance Care Planning:   Does patient have an Advance Directive: decision makers updated. Educated patient about risk for severe COVID-19 due to risk factors according to CDC guidelines. LPN CC reviewed discharge instructions, medical action plan and red flag symptoms with the patient who verbalized understanding. Discussed COVID vaccination status: no. Education provided on COVID-19 vaccination as appropriate. Discussed exposure protocols and quarantine with CDC Guidelines. Patient was given an opportunity to verbalize any questions and concerns and agrees to contact LPN CC or health care provider for questions related to their healthcare. Reviewed and educated patient on any new and changed medications related to discharge diagnosis     Was patient discharged with a pulse oximeter? no     LPN CC provided contact information. Plan for follow-up call in 5-7 days based on severity of symptoms and risk factors.

## 2021-10-19 ENCOUNTER — PATIENT OUTREACH (OUTPATIENT)
Dept: CASE MANAGEMENT | Age: 71
End: 2021-10-19

## 2021-10-19 NOTE — PROGRESS NOTES
Patient resolved from Transition of Care episode on 10/19/21. ACM/CTN was unsuccessful at contacting this patient today. Patient/family was provided the following resources and education related to COVID-19 during the initial call:                         Signs, symptoms and red flags related to COVID-19            CDC exposure and quarantine guidelines            [employer] exposure contact - [phone]            Contact for their local Department of Health                 Patient has not had any additional ED or hospital visits. No further outreach scheduled with this CTN/ACM. Episode of Care resolved. Patient has this CTN/ACM contact information if future needs arise.

## 2022-03-18 PROBLEM — Z91.199 NONADHERENCE TO MEDICAL TREATMENT: Status: ACTIVE | Noted: 2020-07-30

## 2022-03-18 PROBLEM — E66.01 SEVERE OBESITY (BMI 35.0-39.9) WITH COMORBIDITY (HCC): Status: ACTIVE | Noted: 2018-05-14

## 2022-03-19 PROBLEM — I35.0 AORTIC STENOSIS: Status: ACTIVE | Noted: 2020-09-21

## 2022-03-19 PROBLEM — Z79.4 TYPE 2 DIABETES MELLITUS WITHOUT COMPLICATION, WITH LONG-TERM CURRENT USE OF INSULIN (HCC): Status: ACTIVE | Noted: 2017-11-03

## 2022-03-19 PROBLEM — Z95.2 S/P AVR (AORTIC VALVE REPLACEMENT) AND AORTOPLASTY: Status: ACTIVE | Noted: 2020-09-22

## 2022-03-19 PROBLEM — E11.9 TYPE 2 DIABETES MELLITUS WITHOUT COMPLICATION, WITH LONG-TERM CURRENT USE OF INSULIN (HCC): Status: ACTIVE | Noted: 2017-11-03

## 2022-03-19 PROBLEM — I35.0 AORTIC VALVE STENOSIS, SEVERE: Status: ACTIVE | Noted: 2020-09-21

## 2022-03-19 PROBLEM — E11.21 TYPE 2 DIABETES WITH NEPHROPATHY (HCC): Status: ACTIVE | Noted: 2018-05-14

## 2022-06-02 ENCOUNTER — TRANSCRIBE ORDER (OUTPATIENT)
Dept: SCHEDULING | Age: 72
End: 2022-06-02

## 2022-06-02 DIAGNOSIS — R31.0 GROSS HEMATURIA: Primary | ICD-10-CM

## 2022-06-07 ENCOUNTER — HOSPITAL ENCOUNTER (OUTPATIENT)
Dept: CT IMAGING | Age: 72
Discharge: HOME | End: 2022-06-07
Attending: UROLOGY

## 2022-06-07 DIAGNOSIS — R31.0 GROSS HEMATURIA: ICD-10-CM

## 2022-06-16 ENCOUNTER — HOSPITAL ENCOUNTER (OUTPATIENT)
Dept: CT IMAGING | Age: 72
Discharge: HOME | End: 2022-06-16
Attending: UROLOGY
Payer: MEDICARE

## 2022-06-16 LAB — CREAT BLD-MCNC: 2 MG/DL (ref 0.6–1.3)

## 2022-06-16 PROCEDURE — 82565 ASSAY OF CREATININE: CPT

## 2022-06-16 PROCEDURE — 74178 CT ABD&PLV WO CNTR FLWD CNTR: CPT

## 2022-06-16 PROCEDURE — 74011000636 HC RX REV CODE- 636: Performed by: RADIOLOGY

## 2022-06-16 RX ADMIN — IOPAMIDOL 65 ML: 755 INJECTION, SOLUTION INTRAVENOUS at 10:22

## 2023-01-09 PROBLEM — I50.32 CHRONIC DIASTOLIC (CONGESTIVE) HEART FAILURE (HCC): Status: ACTIVE | Noted: 2023-01-09

## 2023-01-09 PROBLEM — D68.69 SECONDARY HYPERCOAGULABLE STATE (HCC): Status: ACTIVE | Noted: 2023-01-09

## 2023-01-12 ENCOUNTER — HOSPITAL ENCOUNTER (OUTPATIENT)
Dept: VASCULAR SURGERY | Age: 73
Discharge: HOME | End: 2023-01-12
Attending: INTERNAL MEDICINE
Payer: MEDICARE

## 2023-01-12 DIAGNOSIS — I15.2 HYPERTENSION COMPLICATING DIABETES (HCC): ICD-10-CM

## 2023-01-12 DIAGNOSIS — Z79.899 ENCOUNTER FOR LONG-TERM (CURRENT) USE OF OTHER MEDICATIONS: ICD-10-CM

## 2023-01-12 DIAGNOSIS — E66.01 SEVERE OBESITY (BMI 35.0-39.9) WITH COMORBIDITY (HCC): ICD-10-CM

## 2023-01-12 DIAGNOSIS — E11.59 HYPERTENSION COMPLICATING DIABETES (HCC): ICD-10-CM

## 2023-01-12 DIAGNOSIS — J06.9 ACUTE URI: ICD-10-CM

## 2023-01-12 DIAGNOSIS — M79.89 SWELLING OF RIGHT FOOT: ICD-10-CM

## 2023-01-12 DIAGNOSIS — D68.69 SECONDARY HYPERCOAGULABLE STATE (HCC): ICD-10-CM

## 2023-01-12 DIAGNOSIS — N18.30 STAGE 3 CHRONIC KIDNEY DISEASE, UNSPECIFIED WHETHER STAGE 3A OR 3B CKD (HCC): ICD-10-CM

## 2023-01-12 DIAGNOSIS — I50.32 CHRONIC DIASTOLIC (CONGESTIVE) HEART FAILURE (HCC): ICD-10-CM

## 2023-01-12 DIAGNOSIS — R42 DIZZINESS: ICD-10-CM

## 2023-01-12 PROCEDURE — 93971 EXTREMITY STUDY: CPT

## 2023-06-19 NOTE — PROGRESS NOTES
Cardiac Cath Lab Procedure Area Arrival Note:    Norma Roland arrived to Cardiac Cath Lab, Procedure Area. Patient identifiers verified with NAME and DATE OF BIRTH. Procedure verified with patient. Consent forms verified. Allergies verified. Patient informed of procedure and plan of care. Questions answered with review. Patient voiced understanding of procedure and plan of care. Patient on cardiac monitor, non-invasive blood pressure, SPO2 monitor. On room air then placed on O2 @ 2 lpm via NC.  IV of normal saline on pump at 100 ml/hr. Patient status doing well without problems. Patient is A&Ox 4. Patient reports no pain. Patient medicated during procedure with orders obtained and verified by Dr. Osmel Nayak. Refer to patients Cardiac Cath Lab PROCEDURE REPORT for vital signs, assessment, status, and response during procedure, printed at end of case. Printed report on chart or scanned into chart.
Pt ambulated to bathroom with steady gait; no complaints of discomfort.
Pt sitting up tolerating food tray well.
Pt verbalized understanding of discharge instructions; PIV removed and guaze with tape placed; no bleeding or swelling . Pt escorted to car via wheelchair with belongings.
TRANSFER - IN REPORT:    Verbal report received from [address] CVT on Ysabel Joe  being received from procedure area for routine progression of care. Report consisted of patients Situation, Background, Assessment and Recommendations(SBAR). Information from the following report(s) SBAR, Procedure Summary, MAR, Recent Results and Cardiac Rhythm Sinus Bradycardia was reviewed with the receiving clinician. Opportunity for questions and clarification was provided. Assessment completed upon patients arrival to [facility] and care assumed. Cardiac Cath Lab Recovery Arrival Note:    Ysabel Joe arrived to [facility] recovery area. Patient procedure = R/LHC. Patient on cardiac monitor, non-invasive blood pressure, SPO2 monitor. On Room Air. IV  of NS on pump at 150 ml/hr. Patient status doing well without problems. Patient is A&Ox 4. Patient reports No Pain. PROCEDURE SITE CHECK:    Procedure site:without any bleeding and No Hematoma, No pain/discomfort reported at procedure site. No change in patient status. Continue to monitor patient and status.
Statement Selected

## 2023-09-28 ENCOUNTER — APPOINTMENT (OUTPATIENT)
Facility: HOSPITAL | Age: 73
DRG: 853 | End: 2023-09-28
Payer: MEDICARE

## 2023-09-28 ENCOUNTER — HOSPITAL ENCOUNTER (INPATIENT)
Facility: HOSPITAL | Age: 73
LOS: 19 days | Discharge: HOME HEALTH | DRG: 853 | End: 2023-10-17
Attending: EMERGENCY MEDICINE | Admitting: STUDENT IN AN ORGANIZED HEALTH CARE EDUCATION/TRAINING PROGRAM
Payer: MEDICARE

## 2023-09-28 DIAGNOSIS — S91.302S OPEN WOUND OF LEFT FOOT, SEQUELA: ICD-10-CM

## 2023-09-28 DIAGNOSIS — L08.9 SOFT TISSUE INFECTION OF FOOT: ICD-10-CM

## 2023-09-28 DIAGNOSIS — A41.9 SEPSIS WITHOUT ACUTE ORGAN DYSFUNCTION, DUE TO UNSPECIFIED ORGANISM (HCC): Primary | ICD-10-CM

## 2023-09-28 DIAGNOSIS — S91.302A OPEN WOUND OF LEFT FOOT, INITIAL ENCOUNTER: ICD-10-CM

## 2023-09-28 PROBLEM — J96.91 RESPIRATORY FAILURE WITH HYPOXIA, UNSPECIFIED CHRONICITY (HCC): Status: ACTIVE | Noted: 2023-09-28

## 2023-09-28 LAB
ALBUMIN SERPL-MCNC: 3.2 G/DL (ref 3.5–5)
ALBUMIN/GLOB SERPL: 0.8 (ref 1.1–2.2)
ALP SERPL-CCNC: 64 U/L (ref 45–117)
ALT SERPL-CCNC: 19 U/L (ref 12–78)
ANION GAP SERPL CALC-SCNC: 9 MMOL/L (ref 5–15)
AST SERPL-CCNC: 16 U/L (ref 15–37)
BASOPHILS # BLD: 0 K/UL (ref 0–0.1)
BASOPHILS NFR BLD: 0 % (ref 0–1)
BILIRUB SERPL-MCNC: 1.5 MG/DL (ref 0.2–1)
BUN SERPL-MCNC: 29 MG/DL (ref 6–20)
BUN/CREAT SERPL: 14 (ref 12–20)
CALCIUM SERPL-MCNC: 8.9 MG/DL (ref 8.5–10.1)
CHLORIDE SERPL-SCNC: 102 MMOL/L (ref 97–108)
CO2 SERPL-SCNC: 22 MMOL/L (ref 21–32)
COMMENT:: NORMAL
CREAT SERPL-MCNC: 2.07 MG/DL (ref 0.7–1.3)
CRP SERPL-MCNC: 8.07 MG/DL (ref 0–0.6)
DIFFERENTIAL METHOD BLD: ABNORMAL
EOSINOPHIL # BLD: 0 K/UL (ref 0–0.4)
EOSINOPHIL NFR BLD: 0 % (ref 0–7)
ERYTHROCYTE [DISTWIDTH] IN BLOOD BY AUTOMATED COUNT: 13.8 % (ref 11.5–14.5)
ERYTHROCYTE [SEDIMENTATION RATE] IN BLOOD: 5 MM/HR (ref 0–20)
FLUAV AG NPH QL IA: NEGATIVE
FLUBV AG NOSE QL IA: NEGATIVE
GLOBULIN SER CALC-MCNC: 4 G/DL (ref 2–4)
GLUCOSE SERPL-MCNC: 100 MG/DL (ref 65–100)
HCT VFR BLD AUTO: 50.3 % (ref 36.6–50.3)
HGB BLD-MCNC: 17.2 G/DL (ref 12.1–17)
IMM GRANULOCYTES # BLD AUTO: 0.2 K/UL (ref 0–0.04)
IMM GRANULOCYTES NFR BLD AUTO: 1 % (ref 0–0.5)
LACTATE BLD-SCNC: 1.23 MMOL/L (ref 0.4–2)
LACTATE SERPL-SCNC: 1.2 MMOL/L (ref 0.4–2)
LYMPHOCYTES # BLD: 0.5 K/UL (ref 0.8–3.5)
LYMPHOCYTES NFR BLD: 3 % (ref 12–49)
MCH RBC QN AUTO: 29.9 PG (ref 26–34)
MCHC RBC AUTO-ENTMCNC: 34.2 G/DL (ref 30–36.5)
MCV RBC AUTO: 87.3 FL (ref 80–99)
MONOCYTES # BLD: 0.5 K/UL (ref 0–1)
MONOCYTES NFR BLD: 3 % (ref 5–13)
NEUTS SEG # BLD: 14.2 K/UL (ref 1.8–8)
NEUTS SEG NFR BLD: 93 % (ref 32–75)
NRBC # BLD: 0 K/UL (ref 0–0.01)
NRBC BLD-RTO: 0 PER 100 WBC
PLATELET # BLD AUTO: 171 K/UL (ref 150–400)
PMV BLD AUTO: 9.5 FL (ref 8.9–12.9)
POTASSIUM SERPL-SCNC: 3 MMOL/L (ref 3.5–5.1)
PROCALCITONIN SERPL-MCNC: 1.84 NG/ML
PROT SERPL-MCNC: 7.2 G/DL (ref 6.4–8.2)
RBC # BLD AUTO: 5.76 M/UL (ref 4.1–5.7)
RBC MORPH BLD: ABNORMAL
SARS-COV-2 RDRP RESP QL NAA+PROBE: NOT DETECTED
SODIUM SERPL-SCNC: 133 MMOL/L (ref 136–145)
SOURCE: NORMAL
SPECIMEN HOLD: NORMAL
TROPONIN I SERPL HS-MCNC: 44 NG/L (ref 0–76)
TROPONIN I SERPL HS-MCNC: 51 NG/L (ref 0–76)
WBC # BLD AUTO: 15.4 K/UL (ref 4.1–11.1)

## 2023-09-28 PROCEDURE — 93005 ELECTROCARDIOGRAM TRACING: CPT | Performed by: EMERGENCY MEDICINE

## 2023-09-28 PROCEDURE — 96374 THER/PROPH/DIAG INJ IV PUSH: CPT

## 2023-09-28 PROCEDURE — 83036 HEMOGLOBIN GLYCOSYLATED A1C: CPT

## 2023-09-28 PROCEDURE — 85025 COMPLETE CBC W/AUTO DIFF WBC: CPT

## 2023-09-28 PROCEDURE — 73630 X-RAY EXAM OF FOOT: CPT

## 2023-09-28 PROCEDURE — 71045 X-RAY EXAM CHEST 1 VIEW: CPT

## 2023-09-28 PROCEDURE — 87070 CULTURE OTHR SPECIMN AEROBIC: CPT

## 2023-09-28 PROCEDURE — 86140 C-REACTIVE PROTEIN: CPT

## 2023-09-28 PROCEDURE — 87186 SC STD MICRODIL/AGAR DIL: CPT

## 2023-09-28 PROCEDURE — 85652 RBC SED RATE AUTOMATED: CPT

## 2023-09-28 PROCEDURE — 6360000002 HC RX W HCPCS: Performed by: EMERGENCY MEDICINE

## 2023-09-28 PROCEDURE — 96366 THER/PROPH/DIAG IV INF ADDON: CPT

## 2023-09-28 PROCEDURE — 99285 EMERGENCY DEPT VISIT HI MDM: CPT

## 2023-09-28 PROCEDURE — 94762 N-INVAS EAR/PLS OXIMTRY CONT: CPT

## 2023-09-28 PROCEDURE — 96365 THER/PROPH/DIAG IV INF INIT: CPT

## 2023-09-28 PROCEDURE — 87635 SARS-COV-2 COVID-19 AMP PRB: CPT

## 2023-09-28 PROCEDURE — 87804 INFLUENZA ASSAY W/OPTIC: CPT

## 2023-09-28 PROCEDURE — 36415 COLL VENOUS BLD VENIPUNCTURE: CPT

## 2023-09-28 PROCEDURE — 2580000003 HC RX 258: Performed by: EMERGENCY MEDICINE

## 2023-09-28 PROCEDURE — 80053 COMPREHEN METABOLIC PANEL: CPT

## 2023-09-28 PROCEDURE — 83605 ASSAY OF LACTIC ACID: CPT

## 2023-09-28 PROCEDURE — 87205 SMEAR GRAM STAIN: CPT

## 2023-09-28 PROCEDURE — 84484 ASSAY OF TROPONIN QUANT: CPT

## 2023-09-28 PROCEDURE — 2060000000 HC ICU INTERMEDIATE R&B

## 2023-09-28 PROCEDURE — 87077 CULTURE AEROBIC IDENTIFY: CPT

## 2023-09-28 PROCEDURE — 87150 DNA/RNA AMPLIFIED PROBE: CPT

## 2023-09-28 PROCEDURE — 87040 BLOOD CULTURE FOR BACTERIA: CPT

## 2023-09-28 PROCEDURE — 84145 PROCALCITONIN (PCT): CPT

## 2023-09-28 RX ORDER — ACETAMINOPHEN 500 MG
1000 TABLET ORAL
Status: DISPENSED | OUTPATIENT
Start: 2023-09-28 | End: 2023-09-29

## 2023-09-28 RX ORDER — ACETAMINOPHEN 160 MG
2000 TABLET,DISINTEGRATING ORAL DAILY
COMMUNITY

## 2023-09-28 RX ORDER — PANTOPRAZOLE SODIUM 40 MG/1
40 TABLET, DELAYED RELEASE ORAL 2 TIMES DAILY
COMMUNITY

## 2023-09-28 RX ADMIN — Medication 2500 MG: at 21:34

## 2023-09-28 RX ADMIN — WATER 2000 MG: 1 INJECTION INTRAMUSCULAR; INTRAVENOUS; SUBCUTANEOUS at 21:21

## 2023-09-28 ASSESSMENT — PAIN - FUNCTIONAL ASSESSMENT: PAIN_FUNCTIONAL_ASSESSMENT: NONE - DENIES PAIN

## 2023-09-28 NOTE — ED TRIAGE NOTES
Pt says that he was feeling malaise starting around 1300. Wife took temperature and it was 104 temporal. EMS also got a temporal temperature of 104. Pt was also experiencing shortness of breath. Blood glucose was 130 in route. Wife at bedside. Says pt was extremely weak today and could barely walk around. Says she has been weak and extremely tired the past week. Upon assessment, pt also has some wounds and blisters on the bottom of his feet and started bleeding in the ED. Pt is on blood thinners. Pt has hx of diabetes, COPD, CHF      Has had 2 open heart surgeries in two years. Had two aortic valve replacements. Also had back surgery 6 years ago.

## 2023-09-29 ENCOUNTER — APPOINTMENT (OUTPATIENT)
Facility: HOSPITAL | Age: 73
DRG: 853 | End: 2023-09-29
Payer: MEDICARE

## 2023-09-29 LAB
ACCESSION NUMBER, LLC1M: ABNORMAL
ACINETOBACTER CALCOAC BAUMANNII COMPLEX BY PCR: NOT DETECTED
ANION GAP SERPL CALC-SCNC: 5 MMOL/L (ref 5–15)
APPEARANCE UR: CLEAR
B FRAGILIS DNA BLD POS QL NAA+NON-PROBE: NOT DETECTED
BACTERIA URNS QL MICRO: NEGATIVE /HPF
BILIRUB UR QL: NEGATIVE
BIOFIRE TEST COMMENT: ABNORMAL
BUN SERPL-MCNC: 30 MG/DL (ref 6–20)
BUN/CREAT SERPL: 17 (ref 12–20)
C ALBICANS DNA BLD POS QL NAA+NON-PROBE: NOT DETECTED
C AURIS DNA BLD POS QL NAA+NON-PROBE: NOT DETECTED
C GATTII+NEOFOR DNA BLD POS QL NAA+N-PRB: NOT DETECTED
C GLABRATA DNA BLD POS QL NAA+NON-PROBE: NOT DETECTED
C KRUSEI DNA BLD POS QL NAA+NON-PROBE: NOT DETECTED
C PARAP DNA BLD POS QL NAA+NON-PROBE: NOT DETECTED
C TROPICLS DNA BLD POS QL NAA+NON-PROBE: NOT DETECTED
CALCIUM SERPL-MCNC: 7.7 MG/DL (ref 8.5–10.1)
CHLORIDE SERPL-SCNC: 110 MMOL/L (ref 97–108)
CO2 SERPL-SCNC: 22 MMOL/L (ref 21–32)
COLOR UR: ABNORMAL
CREAT SERPL-MCNC: 1.81 MG/DL (ref 0.7–1.3)
D DIMER PPP FEU-MCNC: 13.83 MG/L FEU (ref 0–0.65)
E CLOAC COMP DNA BLD POS NAA+NON-PROBE: NOT DETECTED
E COLI DNA BLD POS QL NAA+NON-PROBE: NOT DETECTED
E FAECALIS DNA BLD POS QL NAA+NON-PROBE: DETECTED
E FAECIUM DNA BLD POS QL NAA+NON-PROBE: NOT DETECTED
EKG ATRIAL RATE: 93 BPM
EKG DIAGNOSIS: NORMAL
EKG P AXIS: 28 DEGREES
EKG P-R INTERVAL: 162 MS
EKG Q-T INTERVAL: 368 MS
EKG QRS DURATION: 136 MS
EKG QTC CALCULATION (BAZETT): 457 MS
EKG R AXIS: -69 DEGREES
EKG T AXIS: 72 DEGREES
EKG VENTRICULAR RATE: 93 BPM
ENTEROBACTERALES DNA BLD POS NAA+N-PRB: NOT DETECTED
EPITH CASTS URNS QL MICRO: ABNORMAL /LPF
EST. AVERAGE GLUCOSE BLD GHB EST-MCNC: 128 MG/DL
GLUCOSE BLD STRIP.AUTO-MCNC: 113 MG/DL (ref 65–117)
GLUCOSE BLD STRIP.AUTO-MCNC: 113 MG/DL (ref 65–117)
GLUCOSE BLD STRIP.AUTO-MCNC: 132 MG/DL (ref 65–117)
GLUCOSE BLD STRIP.AUTO-MCNC: 147 MG/DL (ref 65–117)
GLUCOSE SERPL-MCNC: 101 MG/DL (ref 65–100)
GLUCOSE UR STRIP.AUTO-MCNC: >1000 MG/DL
GP B STREP DNA BLD POS QL NAA+NON-PROBE: NOT DETECTED
HAEM INFLU DNA BLD POS QL NAA+NON-PROBE: NOT DETECTED
HBA1C MFR BLD: 6.1 % (ref 4–5.6)
HGB UR QL STRIP: NEGATIVE
K OXYTOCA DNA BLD POS QL NAA+NON-PROBE: NOT DETECTED
KETONES UR QL STRIP.AUTO: NEGATIVE MG/DL
KLEBSIELLA SP DNA BLD POS QL NAA+NON-PRB: NOT DETECTED
KLEBSIELLA SP DNA BLD POS QL NAA+NON-PRB: NOT DETECTED
L MONOCYTOG DNA BLD POS QL NAA+NON-PROBE: NOT DETECTED
LEUKOCYTE ESTERASE UR QL STRIP.AUTO: ABNORMAL
N MEN DNA BLD POS QL NAA+NON-PROBE: NOT DETECTED
NITRITE UR QL STRIP.AUTO: NEGATIVE
P AERUGINOSA DNA BLD POS NAA+NON-PROBE: NOT DETECTED
PH UR STRIP: 6 (ref 5–8)
POTASSIUM SERPL-SCNC: 3.1 MMOL/L (ref 3.5–5.1)
PROT UR STRIP-MCNC: ABNORMAL MG/DL
PROTEUS SP DNA BLD POS QL NAA+NON-PROBE: NOT DETECTED
RBC #/AREA URNS HPF: ABNORMAL /HPF (ref 0–5)
RESISTANT GENE TARGETS: ABNORMAL
S AUREUS DNA BLD POS QL NAA+NON-PROBE: NOT DETECTED
S AUREUS+CONS DNA BLD POS NAA+NON-PROBE: NOT DETECTED
S EPIDERMIDIS DNA BLD POS QL NAA+NON-PRB: NOT DETECTED
S LUGDUNENSIS DNA BLD POS QL NAA+NON-PRB: NOT DETECTED
S MALTOPHILIA DNA BLD POS QL NAA+NON-PRB: NOT DETECTED
S MARCESCENS DNA BLD POS NAA+NON-PROBE: NOT DETECTED
S PNEUM DNA BLD POS QL NAA+NON-PROBE: NOT DETECTED
S PYO DNA BLD POS QL NAA+NON-PROBE: NOT DETECTED
SALMONELLA DNA BLD POS QL NAA+NON-PROBE: NOT DETECTED
SERVICE CMNT-IMP: ABNORMAL
SERVICE CMNT-IMP: ABNORMAL
SERVICE CMNT-IMP: NORMAL
SERVICE CMNT-IMP: NORMAL
SODIUM SERPL-SCNC: 137 MMOL/L (ref 136–145)
SP GR UR REFRACTOMETRY: 1.01 (ref 1–1.03)
SPECIMEN HOLD: NORMAL
STREPTOCOCCUS DNA BLD POS NAA+NON-PROBE: NOT DETECTED
UROBILINOGEN UR QL STRIP.AUTO: 0.2 EU/DL (ref 0.2–1)
VANA+VANB BLD POS QL NAA+NON-PROBE: NOT DETECTED
WBC URNS QL MICRO: ABNORMAL /HPF (ref 0–4)

## 2023-09-29 PROCEDURE — 6360000002 HC RX W HCPCS: Performed by: HOSPITALIST

## 2023-09-29 PROCEDURE — 78580 LUNG PERFUSION IMAGING: CPT

## 2023-09-29 PROCEDURE — 73650 X-RAY EXAM OF HEEL: CPT

## 2023-09-29 PROCEDURE — 2580000003 HC RX 258: Performed by: HOSPITALIST

## 2023-09-29 PROCEDURE — 3430000000 HC RX DIAGNOSTIC RADIOPHARMACEUTICAL: Performed by: STUDENT IN AN ORGANIZED HEALTH CARE EDUCATION/TRAINING PROGRAM

## 2023-09-29 PROCEDURE — 2580000003 HC RX 258: Performed by: STUDENT IN AN ORGANIZED HEALTH CARE EDUCATION/TRAINING PROGRAM

## 2023-09-29 PROCEDURE — 82962 GLUCOSE BLOOD TEST: CPT

## 2023-09-29 PROCEDURE — 74176 CT ABD & PELVIS W/O CONTRAST: CPT

## 2023-09-29 PROCEDURE — 71250 CT THORAX DX C-: CPT

## 2023-09-29 PROCEDURE — 6370000000 HC RX 637 (ALT 250 FOR IP): Performed by: STUDENT IN AN ORGANIZED HEALTH CARE EDUCATION/TRAINING PROGRAM

## 2023-09-29 PROCEDURE — 2500000003 HC RX 250 WO HCPCS: Performed by: STUDENT IN AN ORGANIZED HEALTH CARE EDUCATION/TRAINING PROGRAM

## 2023-09-29 PROCEDURE — A9540 TC99M MAA: HCPCS | Performed by: STUDENT IN AN ORGANIZED HEALTH CARE EDUCATION/TRAINING PROGRAM

## 2023-09-29 PROCEDURE — 6370000000 HC RX 637 (ALT 250 FOR IP): Performed by: NURSE PRACTITIONER

## 2023-09-29 PROCEDURE — 81001 URINALYSIS AUTO W/SCOPE: CPT

## 2023-09-29 PROCEDURE — 99222 1ST HOSP IP/OBS MODERATE 55: CPT | Performed by: INTERNAL MEDICINE

## 2023-09-29 PROCEDURE — 80048 BASIC METABOLIC PNL TOTAL CA: CPT

## 2023-09-29 PROCEDURE — 36415 COLL VENOUS BLD VENIPUNCTURE: CPT

## 2023-09-29 PROCEDURE — 93005 ELECTROCARDIOGRAM TRACING: CPT | Performed by: HOSPITALIST

## 2023-09-29 PROCEDURE — 6360000002 HC RX W HCPCS: Performed by: STUDENT IN AN ORGANIZED HEALTH CARE EDUCATION/TRAINING PROGRAM

## 2023-09-29 PROCEDURE — 2060000000 HC ICU INTERMEDIATE R&B

## 2023-09-29 PROCEDURE — 85379 FIBRIN DEGRADATION QUANT: CPT

## 2023-09-29 RX ORDER — SODIUM CHLORIDE 9 MG/ML
INJECTION, SOLUTION INTRAVENOUS CONTINUOUS
Status: DISCONTINUED | OUTPATIENT
Start: 2023-09-29 | End: 2023-10-01

## 2023-09-29 RX ORDER — ONDANSETRON 2 MG/ML
4 INJECTION INTRAMUSCULAR; INTRAVENOUS EVERY 6 HOURS PRN
Status: DISCONTINUED | OUTPATIENT
Start: 2023-09-29 | End: 2023-10-17 | Stop reason: HOSPADM

## 2023-09-29 RX ORDER — DIPHENHYDRAMINE HCL 25 MG
25 CAPSULE ORAL EVERY 6 HOURS PRN
Status: DISCONTINUED | OUTPATIENT
Start: 2023-09-29 | End: 2023-10-17 | Stop reason: HOSPADM

## 2023-09-29 RX ORDER — SODIUM CHLORIDE 0.9 % (FLUSH) 0.9 %
5-40 SYRINGE (ML) INJECTION PRN
Status: DISCONTINUED | OUTPATIENT
Start: 2023-09-29 | End: 2023-10-17 | Stop reason: HOSPADM

## 2023-09-29 RX ORDER — ASPIRIN 81 MG/1
81 TABLET ORAL DAILY
Status: DISCONTINUED | OUTPATIENT
Start: 2023-09-29 | End: 2023-10-17 | Stop reason: HOSPADM

## 2023-09-29 RX ORDER — ALLOPURINOL 100 MG/1
100 TABLET ORAL EVERY OTHER DAY
Status: DISCONTINUED | OUTPATIENT
Start: 2023-09-29 | End: 2023-10-17 | Stop reason: HOSPADM

## 2023-09-29 RX ORDER — ACETAMINOPHEN 325 MG/1
650 TABLET ORAL EVERY 6 HOURS PRN
Status: DISCONTINUED | OUTPATIENT
Start: 2023-09-29 | End: 2023-10-17 | Stop reason: HOSPADM

## 2023-09-29 RX ORDER — SODIUM CHLORIDE 9 MG/ML
INJECTION, SOLUTION INTRAVENOUS PRN
Status: DISCONTINUED | OUTPATIENT
Start: 2023-09-29 | End: 2023-10-17 | Stop reason: HOSPADM

## 2023-09-29 RX ORDER — POTASSIUM CHLORIDE 750 MG/1
40 TABLET, FILM COATED, EXTENDED RELEASE ORAL ONCE
Status: COMPLETED | OUTPATIENT
Start: 2023-09-29 | End: 2023-09-29

## 2023-09-29 RX ORDER — INSULIN LISPRO 100 [IU]/ML
0-8 INJECTION, SOLUTION INTRAVENOUS; SUBCUTANEOUS
Status: DISCONTINUED | OUTPATIENT
Start: 2023-09-29 | End: 2023-10-17 | Stop reason: HOSPADM

## 2023-09-29 RX ORDER — POLYETHYLENE GLYCOL 3350 17 G/17G
17 POWDER, FOR SOLUTION ORAL DAILY PRN
Status: DISCONTINUED | OUTPATIENT
Start: 2023-09-29 | End: 2023-10-17 | Stop reason: HOSPADM

## 2023-09-29 RX ORDER — SODIUM CHLORIDE 0.9 % (FLUSH) 0.9 %
5-40 SYRINGE (ML) INJECTION EVERY 12 HOURS SCHEDULED
Status: DISCONTINUED | OUTPATIENT
Start: 2023-09-29 | End: 2023-10-17 | Stop reason: HOSPADM

## 2023-09-29 RX ORDER — ONDANSETRON 4 MG/1
4 TABLET, ORALLY DISINTEGRATING ORAL EVERY 8 HOURS PRN
Status: DISCONTINUED | OUTPATIENT
Start: 2023-09-29 | End: 2023-10-17 | Stop reason: HOSPADM

## 2023-09-29 RX ORDER — PANTOPRAZOLE SODIUM 40 MG/1
40 TABLET, DELAYED RELEASE ORAL 2 TIMES DAILY
Status: DISCONTINUED | OUTPATIENT
Start: 2023-09-29 | End: 2023-10-17 | Stop reason: HOSPADM

## 2023-09-29 RX ORDER — 0.9 % SODIUM CHLORIDE 0.9 %
1000 INTRAVENOUS SOLUTION INTRAVENOUS ONCE
Status: COMPLETED | OUTPATIENT
Start: 2023-09-29 | End: 2023-09-29

## 2023-09-29 RX ORDER — INSULIN LISPRO 100 [IU]/ML
0-4 INJECTION, SOLUTION INTRAVENOUS; SUBCUTANEOUS NIGHTLY
Status: DISCONTINUED | OUTPATIENT
Start: 2023-09-29 | End: 2023-10-17 | Stop reason: HOSPADM

## 2023-09-29 RX ORDER — CARVEDILOL 12.5 MG/1
12.5 TABLET ORAL 2 TIMES DAILY
Status: DISCONTINUED | OUTPATIENT
Start: 2023-09-29 | End: 2023-10-01

## 2023-09-29 RX ORDER — ATORVASTATIN CALCIUM 40 MG/1
40 TABLET, FILM COATED ORAL DAILY
Status: DISCONTINUED | OUTPATIENT
Start: 2023-09-29 | End: 2023-10-17 | Stop reason: HOSPADM

## 2023-09-29 RX ORDER — ACETAMINOPHEN 650 MG/1
650 SUPPOSITORY RECTAL EVERY 6 HOURS PRN
Status: DISCONTINUED | OUTPATIENT
Start: 2023-09-29 | End: 2023-10-17 | Stop reason: HOSPADM

## 2023-09-29 RX ORDER — DEXTROSE MONOHYDRATE 100 MG/ML
INJECTION, SOLUTION INTRAVENOUS CONTINUOUS PRN
Status: DISCONTINUED | OUTPATIENT
Start: 2023-09-29 | End: 2023-10-10 | Stop reason: SDUPTHER

## 2023-09-29 RX ADMIN — PIPERACILLIN AND TAZOBACTAM 3375 MG: 3; .375 INJECTION, POWDER, LYOPHILIZED, FOR SOLUTION INTRAVENOUS at 21:29

## 2023-09-29 RX ADMIN — WATER 2000 MG: 1 INJECTION INTRAMUSCULAR; INTRAVENOUS; SUBCUTANEOUS at 04:24

## 2023-09-29 RX ADMIN — APIXABAN 5 MG: 5 TABLET, FILM COATED ORAL at 08:35

## 2023-09-29 RX ADMIN — PIPERACILLIN AND TAZOBACTAM 4500 MG: 4; .5 INJECTION, POWDER, LYOPHILIZED, FOR SOLUTION INTRAVENOUS at 14:09

## 2023-09-29 RX ADMIN — PANTOPRAZOLE SODIUM 40 MG: 40 TABLET, DELAYED RELEASE ORAL at 08:35

## 2023-09-29 RX ADMIN — SODIUM CHLORIDE 1000 ML: 9 INJECTION, SOLUTION INTRAVENOUS at 04:25

## 2023-09-29 RX ADMIN — CARVEDILOL 12.5 MG: 12.5 TABLET, FILM COATED ORAL at 08:35

## 2023-09-29 RX ADMIN — CARVEDILOL 12.5 MG: 12.5 TABLET, FILM COATED ORAL at 21:29

## 2023-09-29 RX ADMIN — SODIUM CHLORIDE: 9 INJECTION, SOLUTION INTRAVENOUS at 04:25

## 2023-09-29 RX ADMIN — PANTOPRAZOLE SODIUM 40 MG: 40 TABLET, DELAYED RELEASE ORAL at 21:29

## 2023-09-29 RX ADMIN — SODIUM CHLORIDE: 9 INJECTION, SOLUTION INTRAVENOUS at 18:06

## 2023-09-29 RX ADMIN — APIXABAN 5 MG: 5 TABLET, FILM COATED ORAL at 21:30

## 2023-09-29 RX ADMIN — ATORVASTATIN CALCIUM 40 MG: 40 TABLET, FILM COATED ORAL at 08:35

## 2023-09-29 RX ADMIN — ACETAMINOPHEN 650 MG: 325 TABLET ORAL at 21:30

## 2023-09-29 RX ADMIN — DIPHENHYDRAMINE HYDROCHLORIDE 25 MG: 25 CAPSULE ORAL at 21:30

## 2023-09-29 RX ADMIN — ASPIRIN 81 MG: 81 TABLET, COATED ORAL at 08:39

## 2023-09-29 RX ADMIN — POTASSIUM CHLORIDE 40 MEQ: 750 TABLET, EXTENDED RELEASE ORAL at 04:24

## 2023-09-29 RX ADMIN — DOXYCYCLINE 100 MG: 100 INJECTION, POWDER, LYOPHILIZED, FOR SOLUTION INTRAVENOUS at 04:24

## 2023-09-29 RX ADMIN — KIT FOR THE PREPARATION OF TECHNETIUM TC 99M ALBUMIN AGGREGATED 4.4 MILLICURIE: 2.5 INJECTION, POWDER, FOR SOLUTION INTRAVENOUS at 07:38

## 2023-09-29 RX ADMIN — SODIUM CHLORIDE, PRESERVATIVE FREE 10 ML: 5 INJECTION INTRAVENOUS at 08:40

## 2023-09-29 RX ADMIN — ALLOPURINOL 100 MG: 100 TABLET ORAL at 08:38

## 2023-09-29 NOTE — ED NOTES
Report rcvd. Pt resting on stretcher with eyes closed and chest rising/falling appropriately. Pt on monitor x3; vss and documented. Pt on room air and maintaining appropriate O2 sats; bipap on standby at this time.  Pt admitted and waiting for a clean bed assignment     Brigid Reeves RN  09/29/23 8771

## 2023-09-29 NOTE — ED NOTES
Bedside and Verbal shift change report given to [address] [place] [address],[address] (oncoming nurse) by Marshall Desouza RN (offgoing nurse). Report included the following information Nurse Handoff Report, ED Encounter Summary, ED SBAR, Adult Overview, Intake/Output, MAR, and Recent Results.         Miley Sawyer RN  09/28/23 7030

## 2023-09-29 NOTE — ED NOTES
This RN notified Dr. Rolly Serrato about elevated D-Dimer.  Awaiting CT results      Rajinder Damon RN  09/29/23 0944

## 2023-09-29 NOTE — ED NOTES
TRANSFER - OUT REPORT:    Verbal report given to Hayes Aburto on [employer]  being transferred to  for routine progression of patient care       Report consisted of patient's Situation, Background, Assessment and   Recommendations(SBAR). Information from the following report(s) Nurse Handoff Report, ED SBAR, Intake/Output, MAR, Recent Results, Cardiac Rhythm sinus italia, and Neuro Assessment was reviewed with the receiving nurse. [place] Assessment:    Presents to emergency department  because of falls (Syncope, seizure, or loss of consciousness): No  Age > 70: Yes  Altered Mental Status, Intoxication with alcohol or substance confusion (Disorientation, impaired judgment, poor safety awaremess, or inability to follow instructions): No  Impaired Mobility: Ambulates or transfers with assistive devices or assistance; Unable to ambulate or transer.: Yes  Nursing Judgement: Yes          Lines:   Peripheral IV 09/28/23 Posterior;Right Forearm (Active)   Site Assessment Clean, dry & intact 09/28/23 2121   Line Status Blood return noted 09/28/23 2121   Phlebitis Assessment No symptoms 09/28/23 2121   Infiltration Assessment 0 09/28/23 2121       Peripheral IV 09/28/23 Left;Posterior Forearm (Active)   Site Assessment Clean, dry & intact 09/28/23 2134   Line Status Blood return noted 09/28/23 2134   Phlebitis Assessment No symptoms 09/28/23 2134   Infiltration Assessment 0 09/28/23 2134        Opportunity for questions and clarification was provided.       Patient transported with:  Registered Nurse Brian Nick Eglorena  09/29/23 6832

## 2023-09-29 NOTE — ED NOTES
Pt. Trialed without bipap, maintaining well with a saturation of 97% on room air.  RR even and unlabored, pt denies difficulty breathing     [place] RN  09/29/23 2010

## 2023-09-29 NOTE — ED NOTES
Patient is admitted to the hospital service in the ED. I was told by RN Fatuma Perkins that patient had a worsening of his respiratory status and need to be evaluated. Patient noted to be sitting up, increased work of breathing noted. He says he feels worse than when he first came in. He was admitted by Dr. Sveltana Prajapati for COPD exacerbation. Wife who was at bedside says that patient was breathing hard like this prior to arrival to the ED, and this episode is similar to prior. Patient able to speak in short sentences, reports he uses CPAP at home, has never been on BiPAP before. They state that he has never been admitted for COPD exacerbation to the hospital before. RT called and patient will benefit with being on BiPAP, he is amenable. RN reported that she had informed Dr. Kaye Girard who admitted patient. We will continue to monitor.     MD Stanton Hanna MD  09/28/23 0766

## 2023-09-29 NOTE — H&P
History & Physical    Primary Care Provider: Helen Brown MD  Source of Information: Patient and chart review    History of Presenting Illness:   Pema Garcia is a pleasant 72 y.o. male history of CAD, CHF, CKD stage III, hypertension, type 2 diabetes, dyslipidemia, BPH who presented to the hospital accompanied by his wife with multiple complaints. Wife states patient has overall felt ill over the last 6 months. he feels his symptoms have been worse over the last week. Wife endorses unclear amount of weight loss over the last 6 months. He has had increasing malaise, fatigue, and fevers, chills and shortness of breath. 3 days ago where he states he had abrupt onset of dyspnea which self resolved. Had fever today with reported Tmax of 102 and transient tachypnea and dyspnea which prompted wife to bring him to the hospital.  EMS was activated and he reportedly noted serous fluid draining from sole of his left foot. Patient fell at baseline at time of ED presentation. He was later reportedly tachypneic and tripoding and was transiently placed on BiPAP. At time of my evaluation, He feels fine and at baseline with appropriate saturations on room air. The patient denies any fever, chills, chest or abdominal pain, nausea, vomiting, cough, congestion, recent illness, palpitations, or dysuria. Remarkable vitals on ER Presentation: Tmax 101.9  Labs Remarkable for: WBC 15.4, potassium 3.0, creatinine 2.07  ER Images: Chest x-ray and x-ray of left foot showed no acute process. ER Rx: Vancomycin and cefepime     Review of Systems:  Pertinent items are noted in the History of Present Illness.      Past Medical History:   Diagnosis Date    BPH (benign prostatic hypertrophy)     CAD (coronary artery disease)     CHF NYHA class III (symptoms with mildly strenuous activities) ([address]) 10/24/2014    Chronic kidney disease     diabetic kidneys    CKD (chronic kidney disease), stage III ([address]) 4/2/2014    Coagulation

## 2023-09-29 NOTE — ED NOTES
Patient's heart rate dipping into 40s, EKG completed to show sinus iatlia with 1st degree AV block and PACs. MD alerted.       Og Boss RN  09/29/23 3659

## 2023-09-29 NOTE — ED NOTES
Pt sitting on edge of bed with increased work of breathing. Pt says that this is not normal for him.  MD made aware and respiratory called to put pt on BIPAP     Denilson Reynolds RN  09/28/23 2560

## 2023-09-29 NOTE — ED PROVIDER NOTES
Normocephalic and atraumatic. Cardiovascular:      Rate and Rhythm: Normal rate and regular rhythm. Heart sounds: Normal heart sounds. Pulmonary:      Effort: Pulmonary effort is normal.      Breath sounds: Normal breath sounds. Skin:     Comments: Left foot with multiple fluid filled bullae one of which is ruptured and draining. Left great toe with tense fluid filled bulla. Diffuse erythema of plantar foot with warmth. Neurological:      Mental Status: He is alert. DIAGNOSTIC RESULTS     EKG: All EKG's are interpreted by the Emergency Department Physician who either signs or Co-signs this chart in the absence of a cardiologist.        RADIOLOGY:   Plain radiographic images, CT and ultrasound are visualized and preliminarily interpreted by the emergency physician as documented in clinical course.     Interpretation per the Radiologist below, if available at the time of this note:    XR CHEST PORTABLE   Final Result      No acute process on portable chest.         XR FOOT LEFT (MIN 3 VIEWS)    (Results Pending)        LABS:  Labs Reviewed   CBC WITH AUTO DIFFERENTIAL - Abnormal; Notable for the following components:       Result Value    WBC 15.4 (*)     RBC 5.76 (*)     Hemoglobin 17.2 (*)     Neutrophils % 93 (*)     Lymphocytes % 3 (*)     Monocytes % 3 (*)     Immature Granulocytes 1 (*)     Neutrophils Absolute 14.2 (*)     Lymphocytes Absolute 0.5 (*)     Absolute Immature Granulocyte 0.2 (*)     All other components within normal limits   COMPREHENSIVE METABOLIC PANEL - Abnormal; Notable for the following components:    Sodium 133 (*)     Potassium 3.0 (*)     BUN 29 (*)     Creatinine 2.07 (*)     Est, Glom Filt Rate 34 (*)     Total Bilirubin 1.5 (*)     Albumin 3.2 (*)     Albumin/Globulin Ratio 0.8 (*)     All other components within normal limits   CULTURE, BLOOD 1   CULTURE, BLOOD 2   COVID-19, RAPID   RAPID INFLUENZA A/B ANTIGENS   CULTURE, WOUND   CULTURE, ANAEROBIC   EXTRA TUBES

## 2023-09-30 ENCOUNTER — APPOINTMENT (OUTPATIENT)
Facility: HOSPITAL | Age: 73
DRG: 853 | End: 2023-09-30
Attending: INTERNAL MEDICINE
Payer: MEDICARE

## 2023-09-30 LAB
ALBUMIN SERPL-MCNC: 2.7 G/DL (ref 3.5–5)
ALBUMIN/GLOB SERPL: 0.7 (ref 1.1–2.2)
ALP SERPL-CCNC: 64 U/L (ref 45–117)
ALT SERPL-CCNC: 17 U/L (ref 12–78)
ANION GAP SERPL CALC-SCNC: 8 MMOL/L (ref 5–15)
AST SERPL-CCNC: 17 U/L (ref 15–37)
BACTERIA SPEC CULT: NORMAL
BILIRUB SERPL-MCNC: 1.5 MG/DL (ref 0.2–1)
BUN SERPL-MCNC: 30 MG/DL (ref 6–20)
BUN/CREAT SERPL: 15 (ref 12–20)
CALCIUM SERPL-MCNC: 8.7 MG/DL (ref 8.5–10.1)
CHLORIDE SERPL-SCNC: 106 MMOL/L (ref 97–108)
CO2 SERPL-SCNC: 22 MMOL/L (ref 21–32)
CREAT SERPL-MCNC: 1.95 MG/DL (ref 0.7–1.3)
GLOBULIN SER CALC-MCNC: 3.9 G/DL (ref 2–4)
GLUCOSE BLD STRIP.AUTO-MCNC: 109 MG/DL (ref 65–117)
GLUCOSE BLD STRIP.AUTO-MCNC: 115 MG/DL (ref 65–117)
GLUCOSE BLD STRIP.AUTO-MCNC: 126 MG/DL (ref 65–117)
GLUCOSE BLD STRIP.AUTO-MCNC: 135 MG/DL (ref 65–117)
GLUCOSE SERPL-MCNC: 113 MG/DL (ref 65–100)
GRAM STN SPEC: NORMAL
GRAM STN SPEC: NORMAL
POTASSIUM SERPL-SCNC: 3.6 MMOL/L (ref 3.5–5.1)
PROT SERPL-MCNC: 6.6 G/DL (ref 6.4–8.2)
SERVICE CMNT-IMP: ABNORMAL
SERVICE CMNT-IMP: ABNORMAL
SERVICE CMNT-IMP: NORMAL
SODIUM SERPL-SCNC: 136 MMOL/L (ref 136–145)

## 2023-09-30 PROCEDURE — 1100000003 HC PRIVATE W/ TELEMETRY

## 2023-09-30 PROCEDURE — 6360000002 HC RX W HCPCS: Performed by: INTERNAL MEDICINE

## 2023-09-30 PROCEDURE — 36415 COLL VENOUS BLD VENIPUNCTURE: CPT

## 2023-09-30 PROCEDURE — 80053 COMPREHEN METABOLIC PANEL: CPT

## 2023-09-30 PROCEDURE — 82962 GLUCOSE BLOOD TEST: CPT

## 2023-09-30 PROCEDURE — 2060000000 HC ICU INTERMEDIATE R&B

## 2023-09-30 PROCEDURE — 6370000000 HC RX 637 (ALT 250 FOR IP): Performed by: NURSE PRACTITIONER

## 2023-09-30 PROCEDURE — 93922 UPR/L XTREMITY ART 2 LEVELS: CPT

## 2023-09-30 PROCEDURE — 2580000003 HC RX 258: Performed by: HOSPITALIST

## 2023-09-30 PROCEDURE — 6370000000 HC RX 637 (ALT 250 FOR IP): Performed by: STUDENT IN AN ORGANIZED HEALTH CARE EDUCATION/TRAINING PROGRAM

## 2023-09-30 PROCEDURE — 6360000002 HC RX W HCPCS: Performed by: HOSPITALIST

## 2023-09-30 PROCEDURE — 2580000003 HC RX 258: Performed by: STUDENT IN AN ORGANIZED HEALTH CARE EDUCATION/TRAINING PROGRAM

## 2023-09-30 RX ADMIN — ATORVASTATIN CALCIUM 40 MG: 40 TABLET, FILM COATED ORAL at 09:30

## 2023-09-30 RX ADMIN — APIXABAN 5 MG: 5 TABLET, FILM COATED ORAL at 22:44

## 2023-09-30 RX ADMIN — DIPHENHYDRAMINE HYDROCHLORIDE 25 MG: 25 CAPSULE ORAL at 23:00

## 2023-09-30 RX ADMIN — APIXABAN 5 MG: 5 TABLET, FILM COATED ORAL at 09:30

## 2023-09-30 RX ADMIN — SODIUM CHLORIDE, PRESERVATIVE FREE 10 ML: 5 INJECTION INTRAVENOUS at 09:32

## 2023-09-30 RX ADMIN — PIPERACILLIN AND TAZOBACTAM 3375 MG: 3; .375 INJECTION, POWDER, LYOPHILIZED, FOR SOLUTION INTRAVENOUS at 22:43

## 2023-09-30 RX ADMIN — CARVEDILOL 12.5 MG: 12.5 TABLET, FILM COATED ORAL at 09:30

## 2023-09-30 RX ADMIN — PIPERACILLIN AND TAZOBACTAM 3375 MG: 3; .375 INJECTION, POWDER, LYOPHILIZED, FOR SOLUTION INTRAVENOUS at 12:02

## 2023-09-30 RX ADMIN — SODIUM CHLORIDE: 9 INJECTION, SOLUTION INTRAVENOUS at 22:09

## 2023-09-30 RX ADMIN — CARVEDILOL 12.5 MG: 12.5 TABLET, FILM COATED ORAL at 22:44

## 2023-09-30 RX ADMIN — PANTOPRAZOLE SODIUM 40 MG: 40 TABLET, DELAYED RELEASE ORAL at 22:44

## 2023-09-30 RX ADMIN — ACETAMINOPHEN 650 MG: 325 TABLET ORAL at 23:00

## 2023-09-30 RX ADMIN — ASPIRIN 81 MG: 81 TABLET, COATED ORAL at 09:29

## 2023-09-30 RX ADMIN — Medication 2500 MG: at 12:00

## 2023-09-30 RX ADMIN — ACETAMINOPHEN 650 MG: 325 TABLET ORAL at 03:39

## 2023-09-30 RX ADMIN — PIPERACILLIN AND TAZOBACTAM 3375 MG: 3; .375 INJECTION, POWDER, LYOPHILIZED, FOR SOLUTION INTRAVENOUS at 03:38

## 2023-09-30 RX ADMIN — PANTOPRAZOLE SODIUM 40 MG: 40 TABLET, DELAYED RELEASE ORAL at 09:30

## 2023-09-30 ASSESSMENT — PAIN SCALES - GENERAL
PAINLEVEL_OUTOF10: 3
PAINLEVEL_OUTOF10: 2

## 2023-09-30 ASSESSMENT — ENCOUNTER SYMPTOMS
NAUSEA: 0
RHINORRHEA: 0
TROUBLE SWALLOWING: 0
VOMITING: 0
SHORTNESS OF BREATH: 0
BACK PAIN: 0
ABDOMINAL PAIN: 0

## 2023-10-01 PROBLEM — I73.9 PAD (PERIPHERAL ARTERY DISEASE) (HCC): Status: ACTIVE | Noted: 2023-10-01

## 2023-10-01 PROBLEM — L03.116 CELLULITIS OF LEFT FOOT: Status: ACTIVE | Noted: 2023-10-01

## 2023-10-01 PROBLEM — S91.302A OPEN WOUND OF LEFT FOOT: Status: ACTIVE | Noted: 2023-10-01

## 2023-10-01 LAB
ALBUMIN SERPL-MCNC: 2.2 G/DL (ref 3.5–5)
ANION GAP SERPL CALC-SCNC: 5 MMOL/L (ref 5–15)
BACTERIA SPEC CULT: ABNORMAL
BASOPHILS # BLD: 0.1 K/UL (ref 0–0.1)
BASOPHILS NFR BLD: 1 % (ref 0–1)
BUN SERPL-MCNC: 25 MG/DL (ref 6–20)
BUN/CREAT SERPL: 14 (ref 12–20)
CALCIUM SERPL-MCNC: 8.2 MG/DL (ref 8.5–10.1)
CHLORIDE SERPL-SCNC: 112 MMOL/L (ref 97–108)
CO2 SERPL-SCNC: 22 MMOL/L (ref 21–32)
CREAT SERPL-MCNC: 1.76 MG/DL (ref 0.7–1.3)
DIFFERENTIAL METHOD BLD: ABNORMAL
ECHO BSA: 2.46 M2
EKG ATRIAL RATE: 47 BPM
EKG DIAGNOSIS: NORMAL
EKG P AXIS: 67 DEGREES
EKG P-R INTERVAL: 214 MS
EKG Q-T INTERVAL: 488 MS
EKG QRS DURATION: 144 MS
EKG QTC CALCULATION (BAZETT): 431 MS
EKG R AXIS: -50 DEGREES
EKG T AXIS: -13 DEGREES
EKG VENTRICULAR RATE: 47 BPM
EOSINOPHIL # BLD: 0.2 K/UL (ref 0–0.4)
EOSINOPHIL NFR BLD: 2 % (ref 0–7)
ERYTHROCYTE [DISTWIDTH] IN BLOOD BY AUTOMATED COUNT: 14.3 % (ref 11.5–14.5)
GLUCOSE BLD STRIP.AUTO-MCNC: 114 MG/DL (ref 65–117)
GLUCOSE BLD STRIP.AUTO-MCNC: 127 MG/DL (ref 65–117)
GLUCOSE BLD STRIP.AUTO-MCNC: 144 MG/DL (ref 65–117)
GLUCOSE BLD STRIP.AUTO-MCNC: 168 MG/DL (ref 65–117)
GLUCOSE SERPL-MCNC: 112 MG/DL (ref 65–100)
HCT VFR BLD AUTO: 41.4 % (ref 36.6–50.3)
HGB BLD-MCNC: 13.9 G/DL (ref 12.1–17)
IMM GRANULOCYTES # BLD AUTO: 0 K/UL (ref 0–0.04)
IMM GRANULOCYTES NFR BLD AUTO: 1 % (ref 0–0.5)
LYMPHOCYTES # BLD: 1.1 K/UL (ref 0.8–3.5)
LYMPHOCYTES NFR BLD: 13 % (ref 12–49)
MAGNESIUM SERPL-MCNC: 2.3 MG/DL (ref 1.6–2.4)
MCH RBC QN AUTO: 30 PG (ref 26–34)
MCHC RBC AUTO-ENTMCNC: 33.6 G/DL (ref 30–36.5)
MCV RBC AUTO: 89.2 FL (ref 80–99)
MONOCYTES # BLD: 0.8 K/UL (ref 0–1)
MONOCYTES NFR BLD: 9 % (ref 5–13)
NEUTS SEG # BLD: 6.5 K/UL (ref 1.8–8)
NEUTS SEG NFR BLD: 74 % (ref 32–75)
NRBC # BLD: 0 K/UL (ref 0–0.01)
NRBC BLD-RTO: 0 PER 100 WBC
PHOSPHATE SERPL-MCNC: 2.5 MG/DL (ref 2.6–4.7)
PLATELET # BLD AUTO: 148 K/UL (ref 150–400)
PMV BLD AUTO: 9.9 FL (ref 8.9–12.9)
POTASSIUM SERPL-SCNC: 3.4 MMOL/L (ref 3.5–5.1)
RBC # BLD AUTO: 4.64 M/UL (ref 4.1–5.7)
SERVICE CMNT-IMP: ABNORMAL
SERVICE CMNT-IMP: NORMAL
SODIUM SERPL-SCNC: 139 MMOL/L (ref 136–145)
VAS LEFT ABI: 1.1
VAS LEFT ARM BP: 125 MMHG
VAS LEFT DORSALIS PEDIS BP: 136 MMHG
VAS LEFT PTA BP: 137 MMHG
VAS RIGHT ABI: 1.22
VAS RIGHT DORSALIS PEDIS BP: 153 MMHG
VAS RIGHT PTA BP: 148 MMHG
WBC # BLD AUTO: 8.7 K/UL (ref 4.1–11.1)

## 2023-10-01 PROCEDURE — 6360000002 HC RX W HCPCS: Performed by: INTERNAL MEDICINE

## 2023-10-01 PROCEDURE — 6360000002 HC RX W HCPCS: Performed by: HOSPITALIST

## 2023-10-01 PROCEDURE — 2580000003 HC RX 258: Performed by: STUDENT IN AN ORGANIZED HEALTH CARE EDUCATION/TRAINING PROGRAM

## 2023-10-01 PROCEDURE — 6370000000 HC RX 637 (ALT 250 FOR IP): Performed by: INTERNAL MEDICINE

## 2023-10-01 PROCEDURE — 80069 RENAL FUNCTION PANEL: CPT

## 2023-10-01 PROCEDURE — 6370000000 HC RX 637 (ALT 250 FOR IP): Performed by: NURSE PRACTITIONER

## 2023-10-01 PROCEDURE — 2060000000 HC ICU INTERMEDIATE R&B

## 2023-10-01 PROCEDURE — 6370000000 HC RX 637 (ALT 250 FOR IP): Performed by: STUDENT IN AN ORGANIZED HEALTH CARE EDUCATION/TRAINING PROGRAM

## 2023-10-01 PROCEDURE — 82962 GLUCOSE BLOOD TEST: CPT

## 2023-10-01 PROCEDURE — 2580000003 HC RX 258: Performed by: HOSPITALIST

## 2023-10-01 PROCEDURE — 2580000003 HC RX 258: Performed by: INTERNAL MEDICINE

## 2023-10-01 PROCEDURE — 1100000003 HC PRIVATE W/ TELEMETRY

## 2023-10-01 PROCEDURE — 87040 BLOOD CULTURE FOR BACTERIA: CPT

## 2023-10-01 PROCEDURE — 85025 COMPLETE CBC W/AUTO DIFF WBC: CPT

## 2023-10-01 PROCEDURE — 36415 COLL VENOUS BLD VENIPUNCTURE: CPT

## 2023-10-01 PROCEDURE — 83735 ASSAY OF MAGNESIUM: CPT

## 2023-10-01 RX ORDER — CARVEDILOL 6.25 MG/1
6.25 TABLET ORAL 2 TIMES DAILY
Status: DISCONTINUED | OUTPATIENT
Start: 2023-10-01 | End: 2023-10-17 | Stop reason: HOSPADM

## 2023-10-01 RX ORDER — POTASSIUM CHLORIDE 750 MG/1
40 TABLET, FILM COATED, EXTENDED RELEASE ORAL ONCE
Status: COMPLETED | OUTPATIENT
Start: 2023-10-01 | End: 2023-10-01

## 2023-10-01 RX ADMIN — DIPHENHYDRAMINE HYDROCHLORIDE 25 MG: 25 CAPSULE ORAL at 21:08

## 2023-10-01 RX ADMIN — PANTOPRAZOLE SODIUM 40 MG: 40 TABLET, DELAYED RELEASE ORAL at 21:03

## 2023-10-01 RX ADMIN — ASPIRIN 81 MG: 81 TABLET, COATED ORAL at 09:19

## 2023-10-01 RX ADMIN — ATORVASTATIN CALCIUM 40 MG: 40 TABLET, FILM COATED ORAL at 09:18

## 2023-10-01 RX ADMIN — SODIUM CHLORIDE: 9 INJECTION, SOLUTION INTRAVENOUS at 10:26

## 2023-10-01 RX ADMIN — PIPERACILLIN AND TAZOBACTAM 3375 MG: 3; .375 INJECTION, POWDER, LYOPHILIZED, FOR SOLUTION INTRAVENOUS at 05:21

## 2023-10-01 RX ADMIN — VANCOMYCIN HYDROCHLORIDE 1250 MG: 1.25 INJECTION, POWDER, LYOPHILIZED, FOR SOLUTION INTRAVENOUS at 12:59

## 2023-10-01 RX ADMIN — SODIUM CHLORIDE, PRESERVATIVE FREE 10 ML: 5 INJECTION INTRAVENOUS at 21:03

## 2023-10-01 RX ADMIN — POTASSIUM CHLORIDE 40 MEQ: 750 TABLET, EXTENDED RELEASE ORAL at 15:14

## 2023-10-01 RX ADMIN — ALLOPURINOL 100 MG: 100 TABLET ORAL at 09:18

## 2023-10-01 RX ADMIN — PIPERACILLIN AND TAZOBACTAM 3375 MG: 3; .375 INJECTION, POWDER, LYOPHILIZED, FOR SOLUTION INTRAVENOUS at 12:58

## 2023-10-01 RX ADMIN — SODIUM CHLORIDE, PRESERVATIVE FREE 10 ML: 5 INJECTION INTRAVENOUS at 09:18

## 2023-10-01 RX ADMIN — PANTOPRAZOLE SODIUM 40 MG: 40 TABLET, DELAYED RELEASE ORAL at 09:19

## 2023-10-01 RX ADMIN — APIXABAN 5 MG: 5 TABLET, FILM COATED ORAL at 21:03

## 2023-10-01 RX ADMIN — APIXABAN 5 MG: 5 TABLET, FILM COATED ORAL at 09:18

## 2023-10-01 RX ADMIN — PIPERACILLIN AND TAZOBACTAM 3375 MG: 3; .375 INJECTION, POWDER, LYOPHILIZED, FOR SOLUTION INTRAVENOUS at 21:01

## 2023-10-01 ASSESSMENT — PAIN SCALES - GENERAL
PAINLEVEL_OUTOF10: 0
PAINLEVEL_OUTOF10: 1

## 2023-10-01 NOTE — CARE COORDINATION
Care Management Initial Assessment       RUR:13%  Readmission? no  1st IM letter given? yes  1st  letter given: no     10/01/23 1509   Service Assessment   Patient Orientation Alert and Oriented   Cognition Alert   History Provided By Patient   Primary Caregiver Self   Patient's Healthcare Decision Maker is: Legal Next of Kin   PCP Verified by CM Yes   Last Visit to PCP Within last 3 months   Prior Functional Level Independent in ADLs/IADLs   Current Functional Level   (CM to follow for needs)   Can patient return to prior living arrangement Yes   Ability to make needs known: Good   Family able to assist with home care needs: Yes   Would you like for me to discuss the discharge plan with any other family members/significant others, and if so, who? No     This CM spoke with patient in regards to dc planning and transition of care needs. Patient explained to his wife Bari Huerta- [phone] can assist with care and transport at dc. Patient uses [facility] for long-term scripts and [facility] for short term pharmacy needs. Patient stated that he has not have DME at home, but may need assistance after this hospital admission with DME/ [facility] needs. CM to follow for needs.     Pending podiatrist to evaluate     MAYURI SOTO  3:16 PM

## 2023-10-02 ENCOUNTER — APPOINTMENT (OUTPATIENT)
Facility: HOSPITAL | Age: 73
DRG: 853 | End: 2023-10-02
Attending: INTERNAL MEDICINE
Payer: MEDICARE

## 2023-10-02 LAB
ALBUMIN SERPL-MCNC: 2.4 G/DL (ref 3.5–5)
ANION GAP SERPL CALC-SCNC: 5 MMOL/L (ref 5–15)
BASOPHILS # BLD: 0.1 K/UL (ref 0–0.1)
BASOPHILS NFR BLD: 1 % (ref 0–1)
BUN SERPL-MCNC: 18 MG/DL (ref 6–20)
BUN/CREAT SERPL: 11 (ref 12–20)
CALCIUM SERPL-MCNC: 8.4 MG/DL (ref 8.5–10.1)
CHLORIDE SERPL-SCNC: 112 MMOL/L (ref 97–108)
CO2 SERPL-SCNC: 20 MMOL/L (ref 21–32)
COMMENT:: NORMAL
CREAT SERPL-MCNC: 1.59 MG/DL (ref 0.7–1.3)
DIFFERENTIAL METHOD BLD: ABNORMAL
ECHO AO ROOT DIAM: 3.4 CM
ECHO AO ROOT INDEX: 1.42 CM/M2
ECHO AV AREA PEAK VELOCITY: 1.3 CM2
ECHO AV AREA VTI: 1.4 CM2
ECHO AV AREA/BSA PEAK VELOCITY: 0.5 CM2/M2
ECHO AV AREA/BSA VTI: 0.6 CM2/M2
ECHO AV MEAN GRADIENT: 20 MMHG
ECHO AV MEAN VELOCITY: 2.1 M/S
ECHO AV PEAK GRADIENT: 35 MMHG
ECHO AV PEAK VELOCITY: 3 M/S
ECHO AV VELOCITY RATIO: 0.27
ECHO AV VTI: 70 CM
ECHO BSA: 2.47 M2
ECHO LA DIAMETER INDEX: 1.42 CM/M2
ECHO LA DIAMETER: 3.4 CM
ECHO LA TO AORTIC ROOT RATIO: 1
ECHO LV E' LATERAL VELOCITY: 9 CM/S
ECHO LV E' SEPTAL VELOCITY: 9 CM/S
ECHO LV FRACTIONAL SHORTENING: 31 % (ref 28–44)
ECHO LV INTERNAL DIMENSION DIASTOLE INDEX: 1.33 CM/M2
ECHO LV INTERNAL DIMENSION DIASTOLIC: 3.2 CM (ref 4.2–5.9)
ECHO LV INTERNAL DIMENSION SYSTOLIC INDEX: 0.92 CM/M2
ECHO LV INTERNAL DIMENSION SYSTOLIC: 2.2 CM
ECHO LV IVSD: 0.7 CM (ref 0.6–1)
ECHO LV MASS 2D: 54.3 G (ref 88–224)
ECHO LV MASS INDEX 2D: 22.6 G/M2 (ref 49–115)
ECHO LV POSTERIOR WALL DIASTOLIC: 0.7 CM (ref 0.6–1)
ECHO LV RELATIVE WALL THICKNESS RATIO: 0.44
ECHO LVOT AREA: 4.9 CM2
ECHO LVOT AV VTI INDEX: 0.3
ECHO LVOT DIAM: 2.5 CM
ECHO LVOT MEAN GRADIENT: 2 MMHG
ECHO LVOT PEAK GRADIENT: 2 MMHG
ECHO LVOT PEAK VELOCITY: 0.8 M/S
ECHO LVOT STROKE VOLUME INDEX: 42.3 ML/M2
ECHO LVOT SV: 101.6 ML
ECHO LVOT VTI: 20.7 CM
ECHO MV A VELOCITY: 0.79 M/S
ECHO MV A VELOCITY: 0.79 M/S
ECHO MV AREA PHT: 1.2 CM2
ECHO MV E DECELERATION TIME (DT): 617.8 MS
ECHO MV E VELOCITY: 1.07 M/S
ECHO MV E VELOCITY: 1.07 M/S
ECHO MV PRESSURE HALF TIME (PHT): 179.2 MS
ECHO MV REGURGITANT PEAK GRADIENT: 49 MMHG
ECHO MV REGURGITANT PEAK VELOCITY: 3.5 M/S
ECHO PULMONARY ARTERY END DIASTOLIC PRESSURE: 24 MMHG
ECHO PV MAX VELOCITY: 1.6 M/S
ECHO PV PEAK GRADIENT: 10 MMHG
ECHO RV FREE WALL PEAK S': 15 CM/S
ECHO RV TAPSE: 2.2 CM (ref 1.7–?)
ECHO TV REGURGITANT MAX VELOCITY: 1.28 M/S
ECHO TV REGURGITANT PEAK GRADIENT: 7 MMHG
EOSINOPHIL # BLD: 0.2 K/UL (ref 0–0.4)
EOSINOPHIL NFR BLD: 2 % (ref 0–7)
ERYTHROCYTE [DISTWIDTH] IN BLOOD BY AUTOMATED COUNT: 14.3 % (ref 11.5–14.5)
EST. AVERAGE GLUCOSE BLD GHB EST-MCNC: 131 MG/DL
GLUCOSE BLD STRIP.AUTO-MCNC: 103 MG/DL (ref 65–117)
GLUCOSE BLD STRIP.AUTO-MCNC: 111 MG/DL (ref 65–117)
GLUCOSE BLD STRIP.AUTO-MCNC: 122 MG/DL (ref 65–117)
GLUCOSE BLD STRIP.AUTO-MCNC: 162 MG/DL (ref 65–117)
GLUCOSE SERPL-MCNC: 145 MG/DL (ref 65–100)
HBA1C MFR BLD: 6.2 % (ref 4–5.6)
HCT VFR BLD AUTO: 44.3 % (ref 36.6–50.3)
HGB BLD-MCNC: 14.5 G/DL (ref 12.1–17)
IMM GRANULOCYTES # BLD AUTO: 0.1 K/UL (ref 0–0.04)
IMM GRANULOCYTES NFR BLD AUTO: 1 % (ref 0–0.5)
LYMPHOCYTES # BLD: 1.2 K/UL (ref 0.8–3.5)
LYMPHOCYTES NFR BLD: 12 % (ref 12–49)
MAGNESIUM SERPL-MCNC: 2.1 MG/DL (ref 1.6–2.4)
MCH RBC QN AUTO: 30.1 PG (ref 26–34)
MCHC RBC AUTO-ENTMCNC: 32.7 G/DL (ref 30–36.5)
MCV RBC AUTO: 91.9 FL (ref 80–99)
MONOCYTES # BLD: 0.9 K/UL (ref 0–1)
MONOCYTES NFR BLD: 9 % (ref 5–13)
NEUTS SEG # BLD: 7.3 K/UL (ref 1.8–8)
NEUTS SEG NFR BLD: 75 % (ref 32–75)
NRBC # BLD: 0 K/UL (ref 0–0.01)
NRBC BLD-RTO: 0 PER 100 WBC
PHOSPHATE SERPL-MCNC: 2.1 MG/DL (ref 2.6–4.7)
PLATELET # BLD AUTO: 155 K/UL (ref 150–400)
PMV BLD AUTO: 9.6 FL (ref 8.9–12.9)
POTASSIUM SERPL-SCNC: 4 MMOL/L (ref 3.5–5.1)
RBC # BLD AUTO: 4.82 M/UL (ref 4.1–5.7)
RBC MORPH BLD: ABNORMAL
SERVICE CMNT-IMP: ABNORMAL
SERVICE CMNT-IMP: ABNORMAL
SERVICE CMNT-IMP: NORMAL
SERVICE CMNT-IMP: NORMAL
SODIUM SERPL-SCNC: 137 MMOL/L (ref 136–145)
SPECIMEN HOLD: NORMAL
TSH SERPL DL<=0.05 MIU/L-ACNC: 3.04 UIU/ML (ref 0.36–3.74)
VANCOMYCIN SERPL-MCNC: 18.7 UG/ML
WBC # BLD AUTO: 9.8 K/UL (ref 4.1–11.1)

## 2023-10-02 PROCEDURE — 2060000000 HC ICU INTERMEDIATE R&B

## 2023-10-02 PROCEDURE — 6370000000 HC RX 637 (ALT 250 FOR IP): Performed by: INTERNAL MEDICINE

## 2023-10-02 PROCEDURE — 83735 ASSAY OF MAGNESIUM: CPT

## 2023-10-02 PROCEDURE — 82962 GLUCOSE BLOOD TEST: CPT

## 2023-10-02 PROCEDURE — 2580000003 HC RX 258: Performed by: HOSPITALIST

## 2023-10-02 PROCEDURE — 85025 COMPLETE CBC W/AUTO DIFF WBC: CPT

## 2023-10-02 PROCEDURE — 80069 RENAL FUNCTION PANEL: CPT

## 2023-10-02 PROCEDURE — 80202 ASSAY OF VANCOMYCIN: CPT

## 2023-10-02 PROCEDURE — 6360000002 HC RX W HCPCS: Performed by: INTERNAL MEDICINE

## 2023-10-02 PROCEDURE — 2580000003 HC RX 258: Performed by: INTERNAL MEDICINE

## 2023-10-02 PROCEDURE — 97530 THERAPEUTIC ACTIVITIES: CPT

## 2023-10-02 PROCEDURE — 2580000003 HC RX 258: Performed by: STUDENT IN AN ORGANIZED HEALTH CARE EDUCATION/TRAINING PROGRAM

## 2023-10-02 PROCEDURE — 93306 TTE W/DOPPLER COMPLETE: CPT

## 2023-10-02 PROCEDURE — 6360000002 HC RX W HCPCS: Performed by: HOSPITALIST

## 2023-10-02 PROCEDURE — 36415 COLL VENOUS BLD VENIPUNCTURE: CPT

## 2023-10-02 PROCEDURE — 6370000000 HC RX 637 (ALT 250 FOR IP): Performed by: STUDENT IN AN ORGANIZED HEALTH CARE EDUCATION/TRAINING PROGRAM

## 2023-10-02 PROCEDURE — 83036 HEMOGLOBIN GLYCOSYLATED A1C: CPT

## 2023-10-02 PROCEDURE — 97165 OT EVAL LOW COMPLEX 30 MIN: CPT

## 2023-10-02 PROCEDURE — 84443 ASSAY THYROID STIM HORMONE: CPT

## 2023-10-02 PROCEDURE — 6370000000 HC RX 637 (ALT 250 FOR IP): Performed by: NURSE PRACTITIONER

## 2023-10-02 PROCEDURE — 97161 PT EVAL LOW COMPLEX 20 MIN: CPT

## 2023-10-02 RX ADMIN — ASPIRIN 81 MG: 81 TABLET, COATED ORAL at 09:33

## 2023-10-02 RX ADMIN — CARVEDILOL 6.25 MG: 6.25 TABLET, FILM COATED ORAL at 21:30

## 2023-10-02 RX ADMIN — ATORVASTATIN CALCIUM 40 MG: 40 TABLET, FILM COATED ORAL at 09:33

## 2023-10-02 RX ADMIN — PIPERACILLIN AND TAZOBACTAM 3375 MG: 3; .375 INJECTION, POWDER, LYOPHILIZED, FOR SOLUTION INTRAVENOUS at 11:55

## 2023-10-02 RX ADMIN — APIXABAN 5 MG: 5 TABLET, FILM COATED ORAL at 21:30

## 2023-10-02 RX ADMIN — PANTOPRAZOLE SODIUM 40 MG: 40 TABLET, DELAYED RELEASE ORAL at 21:30

## 2023-10-02 RX ADMIN — VANCOMYCIN HYDROCHLORIDE 1250 MG: 1.25 INJECTION, POWDER, LYOPHILIZED, FOR SOLUTION INTRAVENOUS at 12:50

## 2023-10-02 RX ADMIN — CARVEDILOL 6.25 MG: 6.25 TABLET, FILM COATED ORAL at 09:33

## 2023-10-02 RX ADMIN — PIPERACILLIN AND TAZOBACTAM 3375 MG: 3; .375 INJECTION, POWDER, LYOPHILIZED, FOR SOLUTION INTRAVENOUS at 03:58

## 2023-10-02 RX ADMIN — DIPHENHYDRAMINE HYDROCHLORIDE 25 MG: 25 CAPSULE ORAL at 21:41

## 2023-10-02 RX ADMIN — PANTOPRAZOLE SODIUM 40 MG: 40 TABLET, DELAYED RELEASE ORAL at 09:32

## 2023-10-02 RX ADMIN — SODIUM CHLORIDE, PRESERVATIVE FREE 10 ML: 5 INJECTION INTRAVENOUS at 09:33

## 2023-10-02 RX ADMIN — SODIUM CHLORIDE, PRESERVATIVE FREE 10 ML: 5 INJECTION INTRAVENOUS at 21:32

## 2023-10-02 RX ADMIN — APIXABAN 5 MG: 5 TABLET, FILM COATED ORAL at 09:33

## 2023-10-02 RX ADMIN — PIPERACILLIN AND TAZOBACTAM 3375 MG: 3; .375 INJECTION, POWDER, LYOPHILIZED, FOR SOLUTION INTRAVENOUS at 21:30

## 2023-10-02 ASSESSMENT — PAIN SCALES - GENERAL: PAINLEVEL_OUTOF10: 0

## 2023-10-03 ENCOUNTER — ANESTHESIA (OUTPATIENT)
Facility: HOSPITAL | Age: 73
End: 2023-10-03
Payer: MEDICARE

## 2023-10-03 ENCOUNTER — ANESTHESIA EVENT (OUTPATIENT)
Facility: HOSPITAL | Age: 73
End: 2023-10-03
Payer: MEDICARE

## 2023-10-03 ENCOUNTER — APPOINTMENT (OUTPATIENT)
Facility: HOSPITAL | Age: 73
DRG: 853 | End: 2023-10-03
Attending: INTERNAL MEDICINE
Payer: MEDICARE

## 2023-10-03 LAB
ALBUMIN SERPL-MCNC: 2.7 G/DL (ref 3.5–5)
ANION GAP SERPL CALC-SCNC: 8 MMOL/L (ref 5–15)
BACTERIA SPEC CULT: ABNORMAL
BACTERIA SPEC CULT: ABNORMAL
BASOPHILS # BLD: 0.1 K/UL (ref 0–0.1)
BASOPHILS NFR BLD: 1 % (ref 0–1)
BUN SERPL-MCNC: 15 MG/DL (ref 6–20)
BUN/CREAT SERPL: 11 (ref 12–20)
CALCIUM SERPL-MCNC: 8.4 MG/DL (ref 8.5–10.1)
CHLORIDE SERPL-SCNC: 111 MMOL/L (ref 97–108)
CO2 SERPL-SCNC: 19 MMOL/L (ref 21–32)
CREAT SERPL-MCNC: 1.4 MG/DL (ref 0.7–1.3)
DIFFERENTIAL METHOD BLD: ABNORMAL
ECHO AV AREA VTI: 2.5 CM2
ECHO AV AREA/BSA VTI: 1 CM2/M2
ECHO AV MEAN GRADIENT: 16 MMHG
ECHO AV PEAK VELOCITY: 2.9 M/S
ECHO AV VTI: 64.2 CM
ECHO BSA: 2.47 M2
ECHO EST RA PRESSURE: 5 MMHG
ECHO LVOT AREA: 4.2 CM2
ECHO LVOT AV VTI INDEX: 0.6
ECHO LVOT DIAM: 2.3 CM
ECHO LVOT STROKE VOLUME INDEX: 66.4 ML/M2
ECHO LVOT SV: 159.5 ML
ECHO LVOT VTI: 38.4 CM
ECHO RIGHT VENTRICULAR SYSTOLIC PRESSURE (RVSP): 32 MMHG
ECHO TV REGURGITANT MAX VELOCITY: 2.59 M/S
ECHO TV REGURGITANT PEAK GRADIENT: 27 MMHG
EOSINOPHIL # BLD: 0.2 K/UL (ref 0–0.4)
EOSINOPHIL NFR BLD: 2 % (ref 0–7)
ERYTHROCYTE [DISTWIDTH] IN BLOOD BY AUTOMATED COUNT: 13.9 % (ref 11.5–14.5)
GLUCOSE BLD STRIP.AUTO-MCNC: 129 MG/DL (ref 65–117)
GLUCOSE BLD STRIP.AUTO-MCNC: 136 MG/DL (ref 65–117)
GLUCOSE BLD STRIP.AUTO-MCNC: 151 MG/DL (ref 65–117)
GLUCOSE BLD STRIP.AUTO-MCNC: 186 MG/DL (ref 65–117)
GLUCOSE SERPL-MCNC: 157 MG/DL (ref 65–100)
HCT VFR BLD AUTO: 45.8 % (ref 36.6–50.3)
HGB BLD-MCNC: 15.2 G/DL (ref 12.1–17)
IMM GRANULOCYTES # BLD AUTO: 0.1 K/UL (ref 0–0.04)
IMM GRANULOCYTES NFR BLD AUTO: 1 % (ref 0–0.5)
LYMPHOCYTES # BLD: 1.1 K/UL (ref 0.8–3.5)
LYMPHOCYTES NFR BLD: 11 % (ref 12–49)
MAGNESIUM SERPL-MCNC: 2 MG/DL (ref 1.6–2.4)
MCH RBC QN AUTO: 29.9 PG (ref 26–34)
MCHC RBC AUTO-ENTMCNC: 33.2 G/DL (ref 30–36.5)
MCV RBC AUTO: 90.2 FL (ref 80–99)
MONOCYTES # BLD: 0.7 K/UL (ref 0–1)
MONOCYTES NFR BLD: 7 % (ref 5–13)
NEUTS SEG # BLD: 7.6 K/UL (ref 1.8–8)
NEUTS SEG NFR BLD: 78 % (ref 32–75)
NRBC # BLD: 0 K/UL (ref 0–0.01)
NRBC BLD-RTO: 0 PER 100 WBC
NT PRO BNP: 3123 PG/ML
PHOSPHATE SERPL-MCNC: 2.6 MG/DL (ref 2.6–4.7)
PLATELET # BLD AUTO: 160 K/UL (ref 150–400)
PMV BLD AUTO: 9.9 FL (ref 8.9–12.9)
POTASSIUM SERPL-SCNC: 3.8 MMOL/L (ref 3.5–5.1)
RBC # BLD AUTO: 5.08 M/UL (ref 4.1–5.7)
SERVICE CMNT-IMP: ABNORMAL
SODIUM SERPL-SCNC: 138 MMOL/L (ref 136–145)
WBC # BLD AUTO: 9.6 K/UL (ref 4.1–11.1)

## 2023-10-03 PROCEDURE — 87186 SC STD MICRODIL/AGAR DIL: CPT

## 2023-10-03 PROCEDURE — 3700000000 HC ANESTHESIA ATTENDED CARE

## 2023-10-03 PROCEDURE — 93325 DOPPLER ECHO COLOR FLOW MAPG: CPT

## 2023-10-03 PROCEDURE — 99233 SBSQ HOSP IP/OBS HIGH 50: CPT | Performed by: INTERNAL MEDICINE

## 2023-10-03 PROCEDURE — 6370000000 HC RX 637 (ALT 250 FOR IP): Performed by: STUDENT IN AN ORGANIZED HEALTH CARE EDUCATION/TRAINING PROGRAM

## 2023-10-03 PROCEDURE — 83735 ASSAY OF MAGNESIUM: CPT

## 2023-10-03 PROCEDURE — 82962 GLUCOSE BLOOD TEST: CPT

## 2023-10-03 PROCEDURE — 85025 COMPLETE CBC W/AUTO DIFF WBC: CPT

## 2023-10-03 PROCEDURE — 87077 CULTURE AEROBIC IDENTIFY: CPT

## 2023-10-03 PROCEDURE — 6360000002 HC RX W HCPCS: Performed by: NURSE ANESTHETIST, CERTIFIED REGISTERED

## 2023-10-03 PROCEDURE — 6370000000 HC RX 637 (ALT 250 FOR IP): Performed by: INTERNAL MEDICINE

## 2023-10-03 PROCEDURE — 2580000003 HC RX 258: Performed by: INTERNAL MEDICINE

## 2023-10-03 PROCEDURE — 80069 RENAL FUNCTION PANEL: CPT

## 2023-10-03 PROCEDURE — 2580000003 HC RX 258: Performed by: STUDENT IN AN ORGANIZED HEALTH CARE EDUCATION/TRAINING PROGRAM

## 2023-10-03 PROCEDURE — 36415 COLL VENOUS BLD VENIPUNCTURE: CPT

## 2023-10-03 PROCEDURE — 87040 BLOOD CULTURE FOR BACTERIA: CPT

## 2023-10-03 PROCEDURE — 3700000001 HC ADD 15 MINUTES (ANESTHESIA)

## 2023-10-03 PROCEDURE — 2060000000 HC ICU INTERMEDIATE R&B

## 2023-10-03 PROCEDURE — 2580000003 HC RX 258: Performed by: HOSPITALIST

## 2023-10-03 PROCEDURE — 83880 ASSAY OF NATRIURETIC PEPTIDE: CPT

## 2023-10-03 PROCEDURE — B24BZZ4 ULTRASONOGRAPHY OF HEART WITH AORTA, TRANSESOPHAGEAL: ICD-10-PCS | Performed by: INTERNAL MEDICINE

## 2023-10-03 PROCEDURE — 6360000002 HC RX W HCPCS: Performed by: HOSPITALIST

## 2023-10-03 PROCEDURE — 6360000002 HC RX W HCPCS: Performed by: INTERNAL MEDICINE

## 2023-10-03 RX ADMIN — PROPOFOL 40 MG: 10 INJECTION, EMULSION INTRAVENOUS at 16:06

## 2023-10-03 RX ADMIN — VANCOMYCIN HYDROCHLORIDE 1250 MG: 1.25 INJECTION, POWDER, LYOPHILIZED, FOR SOLUTION INTRAVENOUS at 12:33

## 2023-10-03 RX ADMIN — SODIUM CHLORIDE, PRESERVATIVE FREE 10 ML: 5 INJECTION INTRAVENOUS at 08:25

## 2023-10-03 RX ADMIN — PANTOPRAZOLE SODIUM 40 MG: 40 TABLET, DELAYED RELEASE ORAL at 08:24

## 2023-10-03 RX ADMIN — PROPOFOL 30 MG: 10 INJECTION, EMULSION INTRAVENOUS at 16:02

## 2023-10-03 RX ADMIN — PIPERACILLIN AND TAZOBACTAM 3375 MG: 3; .375 INJECTION, POWDER, LYOPHILIZED, FOR SOLUTION INTRAVENOUS at 03:30

## 2023-10-03 RX ADMIN — CARVEDILOL 6.25 MG: 6.25 TABLET, FILM COATED ORAL at 08:24

## 2023-10-03 RX ADMIN — APIXABAN 5 MG: 5 TABLET, FILM COATED ORAL at 21:20

## 2023-10-03 RX ADMIN — PROPOFOL 30 MG: 10 INJECTION, EMULSION INTRAVENOUS at 16:01

## 2023-10-03 RX ADMIN — PIPERACILLIN AND TAZOBACTAM 3375 MG: 3; .375 INJECTION, POWDER, LYOPHILIZED, FOR SOLUTION INTRAVENOUS at 11:16

## 2023-10-03 RX ADMIN — PROPOFOL 40 MG: 10 INJECTION, EMULSION INTRAVENOUS at 16:08

## 2023-10-03 RX ADMIN — ALLOPURINOL 100 MG: 100 TABLET ORAL at 08:24

## 2023-10-03 RX ADMIN — CARVEDILOL 6.25 MG: 6.25 TABLET, FILM COATED ORAL at 21:20

## 2023-10-03 RX ADMIN — SODIUM CHLORIDE: 9 INJECTION, SOLUTION INTRAVENOUS at 15:50

## 2023-10-03 RX ADMIN — PROPOFOL 40 MG: 10 INJECTION, EMULSION INTRAVENOUS at 16:14

## 2023-10-03 RX ADMIN — PROPOFOL 30 MG: 10 INJECTION, EMULSION INTRAVENOUS at 15:59

## 2023-10-03 RX ADMIN — PROPOFOL 40 MG: 10 INJECTION, EMULSION INTRAVENOUS at 16:17

## 2023-10-03 RX ADMIN — PROPOFOL 40 MG: 10 INJECTION, EMULSION INTRAVENOUS at 16:21

## 2023-10-03 RX ADMIN — PROPOFOL 130 MG: 10 INJECTION, EMULSION INTRAVENOUS at 15:57

## 2023-10-03 RX ADMIN — PANTOPRAZOLE SODIUM 40 MG: 40 TABLET, DELAYED RELEASE ORAL at 21:20

## 2023-10-03 RX ADMIN — PROPOFOL 40 MG: 10 INJECTION, EMULSION INTRAVENOUS at 16:12

## 2023-10-03 RX ADMIN — SODIUM CHLORIDE, PRESERVATIVE FREE 10 ML: 5 INJECTION INTRAVENOUS at 21:30

## 2023-10-03 RX ADMIN — ACETAMINOPHEN 650 MG: 325 TABLET ORAL at 21:20

## 2023-10-03 RX ADMIN — PROPOFOL 40 MG: 10 INJECTION, EMULSION INTRAVENOUS at 16:10

## 2023-10-03 RX ADMIN — PROPOFOL 60 MG: 10 INJECTION, EMULSION INTRAVENOUS at 16:04

## 2023-10-03 RX ADMIN — ASPIRIN 81 MG: 81 TABLET, COATED ORAL at 08:24

## 2023-10-03 RX ADMIN — PROPOFOL 40 MG: 10 INJECTION, EMULSION INTRAVENOUS at 16:30

## 2023-10-03 RX ADMIN — ATORVASTATIN CALCIUM 40 MG: 40 TABLET, FILM COATED ORAL at 08:24

## 2023-10-03 RX ADMIN — APIXABAN 5 MG: 5 TABLET, FILM COATED ORAL at 08:24

## 2023-10-03 RX ADMIN — PIPERACILLIN AND TAZOBACTAM 3375 MG: 3; .375 INJECTION, POWDER, LYOPHILIZED, FOR SOLUTION INTRAVENOUS at 18:45

## 2023-10-03 ASSESSMENT — PAIN DESCRIPTION - ORIENTATION
ORIENTATION: LEFT;OTHER (COMMENT)
ORIENTATION: LEFT

## 2023-10-03 ASSESSMENT — PAIN SCALES - GENERAL
PAINLEVEL_OUTOF10: 2
PAINLEVEL_OUTOF10: 0
PAINLEVEL_OUTOF10: 2
PAINLEVEL_OUTOF10: 0
PAINLEVEL_OUTOF10: 0

## 2023-10-03 ASSESSMENT — PAIN - FUNCTIONAL ASSESSMENT: PAIN_FUNCTIONAL_ASSESSMENT: ACTIVITIES ARE NOT PREVENTED

## 2023-10-03 ASSESSMENT — PAIN DESCRIPTION - LOCATION
LOCATION: FOOT;OTHER (COMMENT)
LOCATION: FOOT

## 2023-10-03 ASSESSMENT — PAIN DESCRIPTION - FREQUENCY: FREQUENCY: CONTINUOUS

## 2023-10-03 ASSESSMENT — PAIN DESCRIPTION - DESCRIPTORS: DESCRIPTORS: ACHING

## 2023-10-03 ASSESSMENT — PAIN DESCRIPTION - PAIN TYPE: TYPE: CHRONIC PAIN

## 2023-10-03 ASSESSMENT — PAIN DESCRIPTION - ONSET: ONSET: ON-GOING

## 2023-10-03 NOTE — ANESTHESIA PRE PROCEDURE
Department of Anesthesiology  Preprocedure Note       Name:  Lisa Vallecillo   Age:  72 y.o.  :  1950                                          MRN:  [id number]         Date:  10/3/2023      Surgeon: * No surgeons listed *    Procedure: * No procedures listed *    Medications prior to admission:   Prior to Admission medications    Medication Sig Start Date End Date Taking?  Authorizing Provider   apixaban (ELIQUIS) 5 MG TABS tablet Take 1 tablet by mouth 2 times daily   Yes Historical Provider, MD   Glucosamine-Chondroit-Vit C-Mn (GLUCOSAMINE 1500 COMPLEX PO) Take 1,500 mg by mouth daily   Yes Historical Provider, MD   pantoprazole (PROTONIX) 40 MG tablet Take 1 tablet by mouth in the morning and at bedtime   Yes Historical Provider, MD   Cholecalciferol (VITAMIN D3) 50 MCG (2000 UT) CAPS Take 1 capsule by mouth daily   Yes Historical Provider, MD   spironolactone (ALDACTONE) 25 MG tablet Take 25 mg by mouth daily  Patient taking differently: Take 1 tablet by mouth daily Take 25 mg by mouth daily 23   W Kathie Munroe IV, MD   torsemide (DEMADEX) 20 MG tablet Take 1 tablet by mouth daily Take 20 mg by mouth daily 23   W Kathie Munroe IV, MD   allopurinol (ZYLOPRIM) 100 MG tablet Take 1 tablet by mouth every other day 21   Ar Automatic Reconciliation   ascorbic acid (VITAMIN C) 1000 MG tablet Take 1 tablet by mouth 2 times daily    Ar Automatic Reconciliation   aspirin 81 MG EC tablet Take 1 tablet by mouth daily    Ar Automatic Reconciliation   atorvastatin (LIPITOR) 40 MG tablet Take 1 tablet by mouth daily 3/4/22   Ar Automatic Reconciliation   carvedilol (COREG) 12.5 MG tablet Take 1 tablet by mouth 2 times daily 3/4/22   Ar Automatic Reconciliation   Cholecalciferol 100 MCG (4000 UT) CAPS Take 4,000 Units by mouth daily    Ar Automatic Reconciliation   cloNIDine (CATAPRES) 0.1 MG tablet Take 1 tablet by mouth once 3/4/22   Ar Automatic Reconciliation   empagliflozin (JARDIANCE) 25 MG tablet Take

## 2023-10-04 LAB
ALBUMIN SERPL-MCNC: 2.6 G/DL (ref 3.5–5)
ANION GAP SERPL CALC-SCNC: 6 MMOL/L (ref 5–15)
BASOPHILS # BLD: 0.1 K/UL (ref 0–0.1)
BASOPHILS NFR BLD: 1 % (ref 0–1)
BUN SERPL-MCNC: 16 MG/DL (ref 6–20)
BUN/CREAT SERPL: 11 (ref 12–20)
CALCIUM SERPL-MCNC: 8.6 MG/DL (ref 8.5–10.1)
CHLORIDE SERPL-SCNC: 109 MMOL/L (ref 97–108)
CO2 SERPL-SCNC: 19 MMOL/L (ref 21–32)
CREAT SERPL-MCNC: 1.42 MG/DL (ref 0.7–1.3)
DIFFERENTIAL METHOD BLD: ABNORMAL
EOSINOPHIL # BLD: 0.2 K/UL (ref 0–0.4)
EOSINOPHIL NFR BLD: 2 % (ref 0–7)
ERYTHROCYTE [DISTWIDTH] IN BLOOD BY AUTOMATED COUNT: 14.2 % (ref 11.5–14.5)
GLUCOSE BLD STRIP.AUTO-MCNC: 196 MG/DL (ref 65–117)
GLUCOSE BLD STRIP.AUTO-MCNC: 198 MG/DL (ref 65–117)
GLUCOSE BLD STRIP.AUTO-MCNC: 230 MG/DL (ref 65–117)
GLUCOSE SERPL-MCNC: 181 MG/DL (ref 65–100)
HCT VFR BLD AUTO: 43.3 % (ref 36.6–50.3)
HGB BLD-MCNC: 14.4 G/DL (ref 12.1–17)
IMM GRANULOCYTES # BLD AUTO: 0.1 K/UL (ref 0–0.04)
IMM GRANULOCYTES NFR BLD AUTO: 1 % (ref 0–0.5)
LYMPHOCYTES # BLD: 1.1 K/UL (ref 0.8–3.5)
LYMPHOCYTES NFR BLD: 11 % (ref 12–49)
MAGNESIUM SERPL-MCNC: 2.1 MG/DL (ref 1.6–2.4)
MCH RBC QN AUTO: 29.7 PG (ref 26–34)
MCHC RBC AUTO-ENTMCNC: 33.3 G/DL (ref 30–36.5)
MCV RBC AUTO: 89.3 FL (ref 80–99)
MONOCYTES # BLD: 0.9 K/UL (ref 0–1)
MONOCYTES NFR BLD: 9 % (ref 5–13)
NEUTS SEG # BLD: 7.3 K/UL (ref 1.8–8)
NEUTS SEG NFR BLD: 76 % (ref 32–75)
NRBC # BLD: 0 K/UL (ref 0–0.01)
NRBC BLD-RTO: 0 PER 100 WBC
PHOSPHATE SERPL-MCNC: 2.5 MG/DL (ref 2.6–4.7)
PLATELET # BLD AUTO: 157 K/UL (ref 150–400)
PMV BLD AUTO: 10.1 FL (ref 8.9–12.9)
POTASSIUM SERPL-SCNC: 3.7 MMOL/L (ref 3.5–5.1)
RBC # BLD AUTO: 4.85 M/UL (ref 4.1–5.7)
SERVICE CMNT-IMP: ABNORMAL
SODIUM SERPL-SCNC: 134 MMOL/L (ref 136–145)
WBC # BLD AUTO: 9.7 K/UL (ref 4.1–11.1)

## 2023-10-04 PROCEDURE — 6370000000 HC RX 637 (ALT 250 FOR IP): Performed by: INTERNAL MEDICINE

## 2023-10-04 PROCEDURE — 2060000000 HC ICU INTERMEDIATE R&B

## 2023-10-04 PROCEDURE — 2580000003 HC RX 258: Performed by: STUDENT IN AN ORGANIZED HEALTH CARE EDUCATION/TRAINING PROGRAM

## 2023-10-04 PROCEDURE — 36415 COLL VENOUS BLD VENIPUNCTURE: CPT

## 2023-10-04 PROCEDURE — 6370000000 HC RX 637 (ALT 250 FOR IP): Performed by: STUDENT IN AN ORGANIZED HEALTH CARE EDUCATION/TRAINING PROGRAM

## 2023-10-04 PROCEDURE — 2500000003 HC RX 250 WO HCPCS: Performed by: INTERNAL MEDICINE

## 2023-10-04 PROCEDURE — 82962 GLUCOSE BLOOD TEST: CPT

## 2023-10-04 PROCEDURE — 2580000003 HC RX 258: Performed by: HOSPITALIST

## 2023-10-04 PROCEDURE — 83735 ASSAY OF MAGNESIUM: CPT

## 2023-10-04 PROCEDURE — 6360000002 HC RX W HCPCS: Performed by: HOSPITALIST

## 2023-10-04 PROCEDURE — 85025 COMPLETE CBC W/AUTO DIFF WBC: CPT

## 2023-10-04 PROCEDURE — 2580000003 HC RX 258: Performed by: INTERNAL MEDICINE

## 2023-10-04 PROCEDURE — 6360000002 HC RX W HCPCS: Performed by: INTERNAL MEDICINE

## 2023-10-04 PROCEDURE — 80069 RENAL FUNCTION PANEL: CPT

## 2023-10-04 RX ORDER — BUMETANIDE 0.25 MG/ML
1 INJECTION INTRAMUSCULAR; INTRAVENOUS
Status: DISCONTINUED | OUTPATIENT
Start: 2023-10-04 | End: 2023-10-07

## 2023-10-04 RX ORDER — BUMETANIDE 0.25 MG/ML
1 INJECTION INTRAMUSCULAR; INTRAVENOUS ONCE
Status: COMPLETED | OUTPATIENT
Start: 2023-10-04 | End: 2023-10-04

## 2023-10-04 RX ADMIN — BUMETANIDE 1 MG: 0.25 INJECTION INTRAMUSCULAR; INTRAVENOUS at 23:57

## 2023-10-04 RX ADMIN — APIXABAN 5 MG: 5 TABLET, FILM COATED ORAL at 09:21

## 2023-10-04 RX ADMIN — PIPERACILLIN AND TAZOBACTAM 3375 MG: 3; .375 INJECTION, POWDER, LYOPHILIZED, FOR SOLUTION INTRAVENOUS at 12:03

## 2023-10-04 RX ADMIN — ASPIRIN 81 MG: 81 TABLET, COATED ORAL at 09:21

## 2023-10-04 RX ADMIN — PIPERACILLIN AND TAZOBACTAM 3375 MG: 3; .375 INJECTION, POWDER, LYOPHILIZED, FOR SOLUTION INTRAVENOUS at 18:37

## 2023-10-04 RX ADMIN — VANCOMYCIN HYDROCHLORIDE 1250 MG: 1.25 INJECTION, POWDER, LYOPHILIZED, FOR SOLUTION INTRAVENOUS at 14:00

## 2023-10-04 RX ADMIN — CARVEDILOL 6.25 MG: 6.25 TABLET, FILM COATED ORAL at 09:21

## 2023-10-04 RX ADMIN — ACETAMINOPHEN 650 MG: 325 TABLET ORAL at 22:22

## 2023-10-04 RX ADMIN — PANTOPRAZOLE SODIUM 40 MG: 40 TABLET, DELAYED RELEASE ORAL at 09:21

## 2023-10-04 RX ADMIN — PANTOPRAZOLE SODIUM 40 MG: 40 TABLET, DELAYED RELEASE ORAL at 22:02

## 2023-10-04 RX ADMIN — CARVEDILOL 6.25 MG: 6.25 TABLET, FILM COATED ORAL at 22:02

## 2023-10-04 RX ADMIN — APIXABAN 5 MG: 5 TABLET, FILM COATED ORAL at 22:02

## 2023-10-04 RX ADMIN — PIPERACILLIN AND TAZOBACTAM 3375 MG: 3; .375 INJECTION, POWDER, LYOPHILIZED, FOR SOLUTION INTRAVENOUS at 04:05

## 2023-10-04 RX ADMIN — SODIUM CHLORIDE, PRESERVATIVE FREE 10 ML: 5 INJECTION INTRAVENOUS at 09:22

## 2023-10-04 RX ADMIN — BUMETANIDE 1 MG: 0.25 INJECTION INTRAMUSCULAR; INTRAVENOUS at 15:27

## 2023-10-04 RX ADMIN — ATORVASTATIN CALCIUM 40 MG: 40 TABLET, FILM COATED ORAL at 09:21

## 2023-10-04 ASSESSMENT — PAIN SCALES - GENERAL: PAINLEVEL_OUTOF10: 3

## 2023-10-04 NOTE — WOUND CARE
New consult for bilateral feet wounds  Patient seen by Podiatry 10/1/23, wound care orders already in place     Available as needed    Zoila Sheldon  MSN, RN  [address] [facility].   Available through Lisa Gooden

## 2023-10-05 LAB
ANION GAP SERPL CALC-SCNC: 6 MMOL/L (ref 5–15)
BACTERIA SPEC CULT: ABNORMAL
BACTERIA SPEC CULT: ABNORMAL
BUN SERPL-MCNC: 16 MG/DL (ref 6–20)
BUN/CREAT SERPL: 11 (ref 12–20)
CALCIUM SERPL-MCNC: 8.3 MG/DL (ref 8.5–10.1)
CHLORIDE SERPL-SCNC: 108 MMOL/L (ref 97–108)
CO2 SERPL-SCNC: 23 MMOL/L (ref 21–32)
CREAT SERPL-MCNC: 1.52 MG/DL (ref 0.7–1.3)
GLUCOSE BLD STRIP.AUTO-MCNC: 204 MG/DL (ref 65–117)
GLUCOSE BLD STRIP.AUTO-MCNC: 208 MG/DL (ref 65–117)
GLUCOSE BLD STRIP.AUTO-MCNC: 232 MG/DL (ref 65–117)
GLUCOSE BLD STRIP.AUTO-MCNC: 263 MG/DL (ref 65–117)
GLUCOSE SERPL-MCNC: 217 MG/DL (ref 65–100)
POTASSIUM SERPL-SCNC: 3.4 MMOL/L (ref 3.5–5.1)
SERVICE CMNT-IMP: ABNORMAL
SODIUM SERPL-SCNC: 137 MMOL/L (ref 136–145)

## 2023-10-05 PROCEDURE — 82962 GLUCOSE BLOOD TEST: CPT

## 2023-10-05 PROCEDURE — 80048 BASIC METABOLIC PNL TOTAL CA: CPT

## 2023-10-05 PROCEDURE — 2580000003 HC RX 258: Performed by: HOSPITALIST

## 2023-10-05 PROCEDURE — 87040 BLOOD CULTURE FOR BACTERIA: CPT

## 2023-10-05 PROCEDURE — 6370000000 HC RX 637 (ALT 250 FOR IP): Performed by: HOSPITALIST

## 2023-10-05 PROCEDURE — 6360000002 HC RX W HCPCS: Performed by: HOSPITALIST

## 2023-10-05 PROCEDURE — 2060000000 HC ICU INTERMEDIATE R&B

## 2023-10-05 PROCEDURE — 6370000000 HC RX 637 (ALT 250 FOR IP): Performed by: INTERNAL MEDICINE

## 2023-10-05 PROCEDURE — 2500000003 HC RX 250 WO HCPCS: Performed by: INTERNAL MEDICINE

## 2023-10-05 PROCEDURE — 2580000003 HC RX 258: Performed by: INTERNAL MEDICINE

## 2023-10-05 PROCEDURE — 99233 SBSQ HOSP IP/OBS HIGH 50: CPT | Performed by: INTERNAL MEDICINE

## 2023-10-05 PROCEDURE — 6370000000 HC RX 637 (ALT 250 FOR IP): Performed by: STUDENT IN AN ORGANIZED HEALTH CARE EDUCATION/TRAINING PROGRAM

## 2023-10-05 PROCEDURE — 2580000003 HC RX 258: Performed by: STUDENT IN AN ORGANIZED HEALTH CARE EDUCATION/TRAINING PROGRAM

## 2023-10-05 PROCEDURE — 36415 COLL VENOUS BLD VENIPUNCTURE: CPT

## 2023-10-05 PROCEDURE — 6360000002 HC RX W HCPCS: Performed by: INTERNAL MEDICINE

## 2023-10-05 RX ORDER — INSULIN GLARGINE 100 [IU]/ML
25 INJECTION, SOLUTION SUBCUTANEOUS NIGHTLY
Status: DISCONTINUED | OUTPATIENT
Start: 2023-10-05 | End: 2023-10-11

## 2023-10-05 RX ORDER — POTASSIUM CHLORIDE 750 MG/1
40 TABLET, FILM COATED, EXTENDED RELEASE ORAL EVERY 4 HOURS
Status: COMPLETED | OUTPATIENT
Start: 2023-10-05 | End: 2023-10-05

## 2023-10-05 RX ADMIN — ASPIRIN 81 MG: 81 TABLET, COATED ORAL at 09:08

## 2023-10-05 RX ADMIN — ALLOPURINOL 100 MG: 100 TABLET ORAL at 09:08

## 2023-10-05 RX ADMIN — SODIUM CHLORIDE 3000 MG: 900 INJECTION INTRAVENOUS at 20:57

## 2023-10-05 RX ADMIN — Medication 2 UNITS: at 12:24

## 2023-10-05 RX ADMIN — APIXABAN 5 MG: 5 TABLET, FILM COATED ORAL at 09:09

## 2023-10-05 RX ADMIN — POTASSIUM CHLORIDE 40 MEQ: 750 TABLET, FILM COATED, EXTENDED RELEASE ORAL at 16:22

## 2023-10-05 RX ADMIN — Medication 4 UNITS: at 18:00

## 2023-10-05 RX ADMIN — PIPERACILLIN AND TAZOBACTAM: 3; .375 INJECTION, POWDER, LYOPHILIZED, FOR SOLUTION INTRAVENOUS at 04:07

## 2023-10-05 RX ADMIN — Medication 2 UNITS: at 09:05

## 2023-10-05 RX ADMIN — PANTOPRAZOLE SODIUM 40 MG: 40 TABLET, DELAYED RELEASE ORAL at 20:57

## 2023-10-05 RX ADMIN — PANTOPRAZOLE SODIUM 40 MG: 40 TABLET, DELAYED RELEASE ORAL at 09:07

## 2023-10-05 RX ADMIN — CARVEDILOL 6.25 MG: 6.25 TABLET, FILM COATED ORAL at 21:17

## 2023-10-05 RX ADMIN — INSULIN GLARGINE 25 UNITS: 100 INJECTION, SOLUTION SUBCUTANEOUS at 20:57

## 2023-10-05 RX ADMIN — APIXABAN 5 MG: 5 TABLET, FILM COATED ORAL at 20:57

## 2023-10-05 RX ADMIN — SODIUM CHLORIDE, PRESERVATIVE FREE 10 ML: 5 INJECTION INTRAVENOUS at 20:57

## 2023-10-05 RX ADMIN — SODIUM CHLORIDE 3000 MG: 900 INJECTION INTRAVENOUS at 15:31

## 2023-10-05 RX ADMIN — PIPERACILLIN AND TAZOBACTAM 3375 MG: 3; .375 INJECTION, POWDER, LYOPHILIZED, FOR SOLUTION INTRAVENOUS at 12:16

## 2023-10-05 RX ADMIN — ATORVASTATIN CALCIUM 40 MG: 40 TABLET, FILM COATED ORAL at 09:08

## 2023-10-05 RX ADMIN — POTASSIUM CHLORIDE 40 MEQ: 750 TABLET, FILM COATED, EXTENDED RELEASE ORAL at 12:16

## 2023-10-05 RX ADMIN — BUMETANIDE 1 MG: 0.25 INJECTION INTRAMUSCULAR; INTRAVENOUS at 08:08

## 2023-10-05 RX ADMIN — BUMETANIDE 1 MG: 0.25 INJECTION INTRAMUSCULAR; INTRAVENOUS at 16:22

## 2023-10-06 LAB
ALBUMIN SERPL-MCNC: 2.6 G/DL (ref 3.5–5)
ALBUMIN/GLOB SERPL: 0.6 (ref 1.1–2.2)
ALP SERPL-CCNC: 47 U/L (ref 45–117)
ALT SERPL-CCNC: 16 U/L (ref 12–78)
ANION GAP SERPL CALC-SCNC: 9 MMOL/L (ref 5–15)
AST SERPL-CCNC: 10 U/L (ref 15–37)
BILIRUB SERPL-MCNC: 0.9 MG/DL (ref 0.2–1)
BUN SERPL-MCNC: 18 MG/DL (ref 6–20)
BUN/CREAT SERPL: 11 (ref 12–20)
CALCIUM SERPL-MCNC: 8.8 MG/DL (ref 8.5–10.1)
CHLORIDE SERPL-SCNC: 104 MMOL/L (ref 97–108)
CO2 SERPL-SCNC: 21 MMOL/L (ref 21–32)
CREAT SERPL-MCNC: 1.62 MG/DL (ref 0.7–1.3)
GLOBULIN SER CALC-MCNC: 4.2 G/DL (ref 2–4)
GLUCOSE BLD STRIP.AUTO-MCNC: 207 MG/DL (ref 65–117)
GLUCOSE BLD STRIP.AUTO-MCNC: 230 MG/DL (ref 65–117)
GLUCOSE BLD STRIP.AUTO-MCNC: 249 MG/DL (ref 65–117)
GLUCOSE BLD STRIP.AUTO-MCNC: 262 MG/DL (ref 65–117)
GLUCOSE SERPL-MCNC: 273 MG/DL (ref 65–100)
POTASSIUM SERPL-SCNC: 3.7 MMOL/L (ref 3.5–5.1)
PROT SERPL-MCNC: 6.8 G/DL (ref 6.4–8.2)
SERVICE CMNT-IMP: ABNORMAL
SODIUM SERPL-SCNC: 134 MMOL/L (ref 136–145)

## 2023-10-06 PROCEDURE — 80053 COMPREHEN METABOLIC PANEL: CPT

## 2023-10-06 PROCEDURE — 2060000000 HC ICU INTERMEDIATE R&B

## 2023-10-06 PROCEDURE — 6370000000 HC RX 637 (ALT 250 FOR IP): Performed by: STUDENT IN AN ORGANIZED HEALTH CARE EDUCATION/TRAINING PROGRAM

## 2023-10-06 PROCEDURE — 2580000003 HC RX 258: Performed by: STUDENT IN AN ORGANIZED HEALTH CARE EDUCATION/TRAINING PROGRAM

## 2023-10-06 PROCEDURE — 6370000000 HC RX 637 (ALT 250 FOR IP): Performed by: INTERNAL MEDICINE

## 2023-10-06 PROCEDURE — 36415 COLL VENOUS BLD VENIPUNCTURE: CPT

## 2023-10-06 PROCEDURE — 2500000003 HC RX 250 WO HCPCS: Performed by: INTERNAL MEDICINE

## 2023-10-06 PROCEDURE — 2580000003 HC RX 258: Performed by: INTERNAL MEDICINE

## 2023-10-06 PROCEDURE — 6360000002 HC RX W HCPCS: Performed by: INTERNAL MEDICINE

## 2023-10-06 PROCEDURE — 82962 GLUCOSE BLOOD TEST: CPT

## 2023-10-06 PROCEDURE — 6370000000 HC RX 637 (ALT 250 FOR IP): Performed by: HOSPITALIST

## 2023-10-06 RX ADMIN — WATER 2000 MG: 1 INJECTION INTRAMUSCULAR; INTRAVENOUS; SUBCUTANEOUS at 12:40

## 2023-10-06 RX ADMIN — SODIUM CHLORIDE: 9 INJECTION, SOLUTION INTRAVENOUS at 09:09

## 2023-10-06 RX ADMIN — AMPICILLIN SODIUM 2000 MG: 2 INJECTION, POWDER, FOR SOLUTION INTRAVENOUS at 19:26

## 2023-10-06 RX ADMIN — BUMETANIDE 1 MG: 0.25 INJECTION INTRAMUSCULAR; INTRAVENOUS at 06:58

## 2023-10-06 RX ADMIN — SODIUM CHLORIDE, PRESERVATIVE FREE 10 ML: 5 INJECTION INTRAVENOUS at 08:56

## 2023-10-06 RX ADMIN — ASPIRIN 81 MG: 81 TABLET, COATED ORAL at 08:53

## 2023-10-06 RX ADMIN — SODIUM CHLORIDE 3000 MG: 900 INJECTION INTRAVENOUS at 02:41

## 2023-10-06 RX ADMIN — AMPICILLIN SODIUM 2000 MG: 2 INJECTION, POWDER, FOR SOLUTION INTRAVENOUS at 16:02

## 2023-10-06 RX ADMIN — SODIUM CHLORIDE, PRESERVATIVE FREE 10 ML: 5 INJECTION INTRAVENOUS at 21:37

## 2023-10-06 RX ADMIN — Medication 2 UNITS: at 08:55

## 2023-10-06 RX ADMIN — AMPICILLIN SODIUM 2000 MG: 2 INJECTION, POWDER, FOR SOLUTION INTRAVENOUS at 22:45

## 2023-10-06 RX ADMIN — Medication 2 UNITS: at 17:39

## 2023-10-06 RX ADMIN — SODIUM CHLORIDE 3000 MG: 900 INJECTION INTRAVENOUS at 09:11

## 2023-10-06 RX ADMIN — PANTOPRAZOLE SODIUM 40 MG: 40 TABLET, DELAYED RELEASE ORAL at 21:33

## 2023-10-06 RX ADMIN — APIXABAN 5 MG: 5 TABLET, FILM COATED ORAL at 08:54

## 2023-10-06 RX ADMIN — BUMETANIDE 1 MG: 0.25 INJECTION INTRAMUSCULAR; INTRAVENOUS at 16:02

## 2023-10-06 RX ADMIN — INSULIN GLARGINE 25 UNITS: 100 INJECTION, SOLUTION SUBCUTANEOUS at 21:32

## 2023-10-06 RX ADMIN — CARVEDILOL 6.25 MG: 6.25 TABLET, FILM COATED ORAL at 21:33

## 2023-10-06 RX ADMIN — APIXABAN 5 MG: 5 TABLET, FILM COATED ORAL at 21:32

## 2023-10-06 RX ADMIN — PANTOPRAZOLE SODIUM 40 MG: 40 TABLET, DELAYED RELEASE ORAL at 08:54

## 2023-10-06 RX ADMIN — ATORVASTATIN CALCIUM 40 MG: 40 TABLET, FILM COATED ORAL at 08:54

## 2023-10-06 ASSESSMENT — PAIN SCALES - GENERAL
PAINLEVEL_OUTOF10: 0
PAINLEVEL_OUTOF10: 0

## 2023-10-07 LAB
ALBUMIN SERPL-MCNC: 2.6 G/DL (ref 3.5–5)
ALBUMIN/GLOB SERPL: 0.8 (ref 1.1–2.2)
ALP SERPL-CCNC: 48 U/L (ref 45–117)
ALT SERPL-CCNC: 12 U/L (ref 12–78)
ANION GAP SERPL CALC-SCNC: 9 MMOL/L (ref 5–15)
AST SERPL-CCNC: 14 U/L (ref 15–37)
BILIRUB SERPL-MCNC: 0.6 MG/DL (ref 0.2–1)
BUN SERPL-MCNC: 19 MG/DL (ref 6–20)
BUN/CREAT SERPL: 13 (ref 12–20)
CALCIUM SERPL-MCNC: 8.4 MG/DL (ref 8.5–10.1)
CHLORIDE SERPL-SCNC: 105 MMOL/L (ref 97–108)
CO2 SERPL-SCNC: 22 MMOL/L (ref 21–32)
CREAT SERPL-MCNC: 1.46 MG/DL (ref 0.7–1.3)
GLOBULIN SER CALC-MCNC: 3.3 G/DL (ref 2–4)
GLUCOSE BLD STRIP.AUTO-MCNC: 175 MG/DL (ref 65–117)
GLUCOSE BLD STRIP.AUTO-MCNC: 226 MG/DL (ref 65–117)
GLUCOSE BLD STRIP.AUTO-MCNC: 240 MG/DL (ref 65–117)
GLUCOSE BLD STRIP.AUTO-MCNC: 298 MG/DL (ref 65–117)
GLUCOSE SERPL-MCNC: 210 MG/DL (ref 65–100)
POTASSIUM SERPL-SCNC: 3.7 MMOL/L (ref 3.5–5.1)
PROT SERPL-MCNC: 5.9 G/DL (ref 6.4–8.2)
SERVICE CMNT-IMP: ABNORMAL
SODIUM SERPL-SCNC: 136 MMOL/L (ref 136–145)

## 2023-10-07 PROCEDURE — 2580000003 HC RX 258: Performed by: INTERNAL MEDICINE

## 2023-10-07 PROCEDURE — 6370000000 HC RX 637 (ALT 250 FOR IP): Performed by: INTERNAL MEDICINE

## 2023-10-07 PROCEDURE — 2580000003 HC RX 258: Performed by: STUDENT IN AN ORGANIZED HEALTH CARE EDUCATION/TRAINING PROGRAM

## 2023-10-07 PROCEDURE — 36415 COLL VENOUS BLD VENIPUNCTURE: CPT

## 2023-10-07 PROCEDURE — 2500000003 HC RX 250 WO HCPCS: Performed by: INTERNAL MEDICINE

## 2023-10-07 PROCEDURE — 82962 GLUCOSE BLOOD TEST: CPT

## 2023-10-07 PROCEDURE — 6370000000 HC RX 637 (ALT 250 FOR IP): Performed by: HOSPITALIST

## 2023-10-07 PROCEDURE — 6370000000 HC RX 637 (ALT 250 FOR IP): Performed by: STUDENT IN AN ORGANIZED HEALTH CARE EDUCATION/TRAINING PROGRAM

## 2023-10-07 PROCEDURE — 6360000002 HC RX W HCPCS: Performed by: INTERNAL MEDICINE

## 2023-10-07 PROCEDURE — 2060000000 HC ICU INTERMEDIATE R&B

## 2023-10-07 PROCEDURE — 80053 COMPREHEN METABOLIC PANEL: CPT

## 2023-10-07 RX ORDER — TORSEMIDE 20 MG/1
40 TABLET ORAL DAILY
Status: DISCONTINUED | OUTPATIENT
Start: 2023-10-08 | End: 2023-10-13

## 2023-10-07 RX ADMIN — PANTOPRAZOLE SODIUM 40 MG: 40 TABLET, DELAYED RELEASE ORAL at 21:46

## 2023-10-07 RX ADMIN — AMPICILLIN SODIUM 2000 MG: 2 INJECTION, POWDER, FOR SOLUTION INTRAVENOUS at 14:31

## 2023-10-07 RX ADMIN — ALLOPURINOL 100 MG: 100 TABLET ORAL at 10:57

## 2023-10-07 RX ADMIN — WATER 2000 MG: 1 INJECTION INTRAMUSCULAR; INTRAVENOUS; SUBCUTANEOUS at 12:08

## 2023-10-07 RX ADMIN — PANTOPRAZOLE SODIUM 40 MG: 40 TABLET, DELAYED RELEASE ORAL at 10:57

## 2023-10-07 RX ADMIN — AMPICILLIN SODIUM 2000 MG: 2 INJECTION, POWDER, FOR SOLUTION INTRAVENOUS at 06:50

## 2023-10-07 RX ADMIN — APIXABAN 5 MG: 5 TABLET, FILM COATED ORAL at 10:58

## 2023-10-07 RX ADMIN — Medication 4 UNITS: at 17:24

## 2023-10-07 RX ADMIN — APIXABAN 5 MG: 5 TABLET, FILM COATED ORAL at 21:47

## 2023-10-07 RX ADMIN — AMPICILLIN SODIUM 2000 MG: 2 INJECTION, POWDER, FOR SOLUTION INTRAVENOUS at 12:09

## 2023-10-07 RX ADMIN — INSULIN GLARGINE 25 UNITS: 100 INJECTION, SOLUTION SUBCUTANEOUS at 21:47

## 2023-10-07 RX ADMIN — CARVEDILOL 6.25 MG: 6.25 TABLET, FILM COATED ORAL at 21:46

## 2023-10-07 RX ADMIN — CARVEDILOL 6.25 MG: 6.25 TABLET, FILM COATED ORAL at 10:57

## 2023-10-07 RX ADMIN — BUMETANIDE 1 MG: 0.25 INJECTION INTRAMUSCULAR; INTRAVENOUS at 06:52

## 2023-10-07 RX ADMIN — Medication 2 UNITS: at 12:08

## 2023-10-07 RX ADMIN — ASPIRIN 81 MG: 81 TABLET, COATED ORAL at 10:57

## 2023-10-07 RX ADMIN — AMPICILLIN SODIUM 2000 MG: 2 INJECTION, POWDER, FOR SOLUTION INTRAVENOUS at 03:23

## 2023-10-07 RX ADMIN — ATORVASTATIN CALCIUM 40 MG: 40 TABLET, FILM COATED ORAL at 10:57

## 2023-10-07 RX ADMIN — SODIUM CHLORIDE, PRESERVATIVE FREE 10 ML: 5 INJECTION INTRAVENOUS at 10:59

## 2023-10-07 RX ADMIN — WATER 2000 MG: 1 INJECTION INTRAMUSCULAR; INTRAVENOUS; SUBCUTANEOUS at 00:29

## 2023-10-07 RX ADMIN — AMPICILLIN SODIUM 2000 MG: 2 INJECTION, POWDER, FOR SOLUTION INTRAVENOUS at 18:03

## 2023-10-07 ASSESSMENT — PAIN DESCRIPTION - DESCRIPTORS: DESCRIPTORS: SORE

## 2023-10-07 ASSESSMENT — PAIN DESCRIPTION - PAIN TYPE: TYPE: ACUTE PAIN

## 2023-10-07 ASSESSMENT — PAIN SCALES - GENERAL
PAINLEVEL_OUTOF10: 0
PAINLEVEL_OUTOF10: 2

## 2023-10-07 ASSESSMENT — PAIN DESCRIPTION - ORIENTATION: ORIENTATION: LEFT;INNER

## 2023-10-07 ASSESSMENT — PAIN DESCRIPTION - ONSET: ONSET: OTHER (COMMENT)

## 2023-10-07 ASSESSMENT — PAIN DESCRIPTION - LOCATION: LOCATION: LEG

## 2023-10-08 LAB
ALBUMIN SERPL-MCNC: 2.4 G/DL (ref 3.5–5)
ALBUMIN/GLOB SERPL: 0.7 (ref 1.1–2.2)
ALP SERPL-CCNC: 46 U/L (ref 45–117)
ALT SERPL-CCNC: 10 U/L (ref 12–78)
ANION GAP SERPL CALC-SCNC: 6 MMOL/L (ref 5–15)
AST SERPL-CCNC: 8 U/L (ref 15–37)
BILIRUB SERPL-MCNC: 0.7 MG/DL (ref 0.2–1)
BUN SERPL-MCNC: 21 MG/DL (ref 6–20)
BUN/CREAT SERPL: 13 (ref 12–20)
CALCIUM SERPL-MCNC: 8.4 MG/DL (ref 8.5–10.1)
CHLORIDE SERPL-SCNC: 102 MMOL/L (ref 97–108)
CO2 SERPL-SCNC: 26 MMOL/L (ref 21–32)
CREAT SERPL-MCNC: 1.59 MG/DL (ref 0.7–1.3)
GLOBULIN SER CALC-MCNC: 3.4 G/DL (ref 2–4)
GLUCOSE BLD STRIP.AUTO-MCNC: 165 MG/DL (ref 65–117)
GLUCOSE BLD STRIP.AUTO-MCNC: 183 MG/DL (ref 65–117)
GLUCOSE BLD STRIP.AUTO-MCNC: 223 MG/DL (ref 65–117)
GLUCOSE BLD STRIP.AUTO-MCNC: 225 MG/DL (ref 65–117)
GLUCOSE SERPL-MCNC: 230 MG/DL (ref 65–100)
MAGNESIUM SERPL-MCNC: 2 MG/DL (ref 1.6–2.4)
POTASSIUM SERPL-SCNC: 3.4 MMOL/L (ref 3.5–5.1)
PROT SERPL-MCNC: 5.8 G/DL (ref 6.4–8.2)
SERVICE CMNT-IMP: ABNORMAL
SODIUM SERPL-SCNC: 134 MMOL/L (ref 136–145)

## 2023-10-08 PROCEDURE — 6370000000 HC RX 637 (ALT 250 FOR IP): Performed by: HOSPITALIST

## 2023-10-08 PROCEDURE — 6360000002 HC RX W HCPCS: Performed by: INTERNAL MEDICINE

## 2023-10-08 PROCEDURE — 83735 ASSAY OF MAGNESIUM: CPT

## 2023-10-08 PROCEDURE — 2060000000 HC ICU INTERMEDIATE R&B

## 2023-10-08 PROCEDURE — 80053 COMPREHEN METABOLIC PANEL: CPT

## 2023-10-08 PROCEDURE — 6370000000 HC RX 637 (ALT 250 FOR IP): Performed by: INTERNAL MEDICINE

## 2023-10-08 PROCEDURE — 2580000003 HC RX 258: Performed by: STUDENT IN AN ORGANIZED HEALTH CARE EDUCATION/TRAINING PROGRAM

## 2023-10-08 PROCEDURE — 36415 COLL VENOUS BLD VENIPUNCTURE: CPT

## 2023-10-08 PROCEDURE — 2580000003 HC RX 258: Performed by: INTERNAL MEDICINE

## 2023-10-08 PROCEDURE — 6370000000 HC RX 637 (ALT 250 FOR IP): Performed by: STUDENT IN AN ORGANIZED HEALTH CARE EDUCATION/TRAINING PROGRAM

## 2023-10-08 PROCEDURE — 82962 GLUCOSE BLOOD TEST: CPT

## 2023-10-08 RX ADMIN — AMPICILLIN SODIUM 2000 MG: 2 INJECTION, POWDER, FOR SOLUTION INTRAVENOUS at 15:44

## 2023-10-08 RX ADMIN — AMPICILLIN SODIUM 2000 MG: 2 INJECTION, POWDER, FOR SOLUTION INTRAVENOUS at 07:04

## 2023-10-08 RX ADMIN — WATER 2000 MG: 1 INJECTION INTRAMUSCULAR; INTRAVENOUS; SUBCUTANEOUS at 02:00

## 2023-10-08 RX ADMIN — AMPICILLIN SODIUM 2000 MG: 2 INJECTION, POWDER, FOR SOLUTION INTRAVENOUS at 20:31

## 2023-10-08 RX ADMIN — Medication 2 UNITS: at 17:37

## 2023-10-08 RX ADMIN — AMPICILLIN SODIUM 2000 MG: 2 INJECTION, POWDER, FOR SOLUTION INTRAVENOUS at 12:07

## 2023-10-08 RX ADMIN — SODIUM CHLORIDE, PRESERVATIVE FREE 10 ML: 5 INJECTION INTRAVENOUS at 22:39

## 2023-10-08 RX ADMIN — Medication 2 UNITS: at 12:07

## 2023-10-08 RX ADMIN — AMPICILLIN SODIUM 2000 MG: 2 INJECTION, POWDER, FOR SOLUTION INTRAVENOUS at 23:15

## 2023-10-08 RX ADMIN — PANTOPRAZOLE SODIUM 40 MG: 40 TABLET, DELAYED RELEASE ORAL at 22:39

## 2023-10-08 RX ADMIN — WATER 2000 MG: 1 INJECTION INTRAMUSCULAR; INTRAVENOUS; SUBCUTANEOUS at 12:06

## 2023-10-08 RX ADMIN — APIXABAN 5 MG: 5 TABLET, FILM COATED ORAL at 22:39

## 2023-10-08 RX ADMIN — PANTOPRAZOLE SODIUM 40 MG: 40 TABLET, DELAYED RELEASE ORAL at 10:17

## 2023-10-08 RX ADMIN — INSULIN GLARGINE 25 UNITS: 100 INJECTION, SOLUTION SUBCUTANEOUS at 22:40

## 2023-10-08 RX ADMIN — AMPICILLIN SODIUM 2000 MG: 2 INJECTION, POWDER, FOR SOLUTION INTRAVENOUS at 03:12

## 2023-10-08 RX ADMIN — ATORVASTATIN CALCIUM 40 MG: 40 TABLET, FILM COATED ORAL at 10:17

## 2023-10-08 RX ADMIN — CARVEDILOL 6.25 MG: 6.25 TABLET, FILM COATED ORAL at 22:39

## 2023-10-08 RX ADMIN — TORSEMIDE 40 MG: 20 TABLET ORAL at 10:17

## 2023-10-08 RX ADMIN — ASPIRIN 81 MG: 81 TABLET, COATED ORAL at 10:17

## 2023-10-08 RX ADMIN — APIXABAN 5 MG: 5 TABLET, FILM COATED ORAL at 10:17

## 2023-10-08 ASSESSMENT — PAIN SCALES - GENERAL
PAINLEVEL_OUTOF10: 0

## 2023-10-09 LAB
ALBUMIN SERPL-MCNC: 2.5 G/DL (ref 3.5–5)
ALBUMIN/GLOB SERPL: 0.8 (ref 1.1–2.2)
ALP SERPL-CCNC: 46 U/L (ref 45–117)
ALT SERPL-CCNC: 9 U/L (ref 12–78)
ANION GAP SERPL CALC-SCNC: 5 MMOL/L (ref 5–15)
AST SERPL-CCNC: 7 U/L (ref 15–37)
BASOPHILS # BLD: 0.1 K/UL (ref 0–0.1)
BASOPHILS NFR BLD: 1 % (ref 0–1)
BILIRUB SERPL-MCNC: 0.5 MG/DL (ref 0.2–1)
BUN SERPL-MCNC: 22 MG/DL (ref 6–20)
BUN/CREAT SERPL: 13 (ref 12–20)
CALCIUM SERPL-MCNC: 8.6 MG/DL (ref 8.5–10.1)
CHLORIDE SERPL-SCNC: 100 MMOL/L (ref 97–108)
CO2 SERPL-SCNC: 30 MMOL/L (ref 21–32)
CREAT SERPL-MCNC: 1.71 MG/DL (ref 0.7–1.3)
DIFFERENTIAL METHOD BLD: ABNORMAL
EOSINOPHIL # BLD: 0.2 K/UL (ref 0–0.4)
EOSINOPHIL NFR BLD: 3 % (ref 0–7)
ERYTHROCYTE [DISTWIDTH] IN BLOOD BY AUTOMATED COUNT: 13.8 % (ref 11.5–14.5)
GLOBULIN SER CALC-MCNC: 3.3 G/DL (ref 2–4)
GLUCOSE BLD STRIP.AUTO-MCNC: 197 MG/DL (ref 65–117)
GLUCOSE BLD STRIP.AUTO-MCNC: 215 MG/DL (ref 65–117)
GLUCOSE BLD STRIP.AUTO-MCNC: 216 MG/DL (ref 65–117)
GLUCOSE BLD STRIP.AUTO-MCNC: 253 MG/DL (ref 65–117)
GLUCOSE SERPL-MCNC: 248 MG/DL (ref 65–100)
HCT VFR BLD AUTO: 43.1 % (ref 36.6–50.3)
HGB BLD-MCNC: 14.5 G/DL (ref 12.1–17)
IMM GRANULOCYTES # BLD AUTO: 0.1 K/UL (ref 0–0.04)
IMM GRANULOCYTES NFR BLD AUTO: 1 % (ref 0–0.5)
LYMPHOCYTES # BLD: 1.2 K/UL (ref 0.8–3.5)
LYMPHOCYTES NFR BLD: 13 % (ref 12–49)
MCH RBC QN AUTO: 30 PG (ref 26–34)
MCHC RBC AUTO-ENTMCNC: 33.6 G/DL (ref 30–36.5)
MCV RBC AUTO: 89.2 FL (ref 80–99)
MONOCYTES # BLD: 0.6 K/UL (ref 0–1)
MONOCYTES NFR BLD: 6 % (ref 5–13)
NEUTS SEG # BLD: 7.2 K/UL (ref 1.8–8)
NEUTS SEG NFR BLD: 76 % (ref 32–75)
NRBC # BLD: 0 K/UL (ref 0–0.01)
NRBC BLD-RTO: 0 PER 100 WBC
PLATELET # BLD AUTO: 224 K/UL (ref 150–400)
PMV BLD AUTO: 10 FL (ref 8.9–12.9)
POTASSIUM SERPL-SCNC: 3.5 MMOL/L (ref 3.5–5.1)
PROT SERPL-MCNC: 5.8 G/DL (ref 6.4–8.2)
RBC # BLD AUTO: 4.83 M/UL (ref 4.1–5.7)
SERVICE CMNT-IMP: ABNORMAL
SODIUM SERPL-SCNC: 135 MMOL/L (ref 136–145)
WBC # BLD AUTO: 9.4 K/UL (ref 4.1–11.1)

## 2023-10-09 PROCEDURE — 87186 SC STD MICRODIL/AGAR DIL: CPT

## 2023-10-09 PROCEDURE — 2060000000 HC ICU INTERMEDIATE R&B

## 2023-10-09 PROCEDURE — 87070 CULTURE OTHR SPECIMN AEROBIC: CPT

## 2023-10-09 PROCEDURE — 6370000000 HC RX 637 (ALT 250 FOR IP): Performed by: HOSPITALIST

## 2023-10-09 PROCEDURE — 2580000003 HC RX 258: Performed by: STUDENT IN AN ORGANIZED HEALTH CARE EDUCATION/TRAINING PROGRAM

## 2023-10-09 PROCEDURE — 80053 COMPREHEN METABOLIC PANEL: CPT

## 2023-10-09 PROCEDURE — 6370000000 HC RX 637 (ALT 250 FOR IP): Performed by: STUDENT IN AN ORGANIZED HEALTH CARE EDUCATION/TRAINING PROGRAM

## 2023-10-09 PROCEDURE — 2580000003 HC RX 258: Performed by: INTERNAL MEDICINE

## 2023-10-09 PROCEDURE — 87077 CULTURE AEROBIC IDENTIFY: CPT

## 2023-10-09 PROCEDURE — 82962 GLUCOSE BLOOD TEST: CPT

## 2023-10-09 PROCEDURE — 6360000002 HC RX W HCPCS: Performed by: INTERNAL MEDICINE

## 2023-10-09 PROCEDURE — 36415 COLL VENOUS BLD VENIPUNCTURE: CPT

## 2023-10-09 PROCEDURE — 85025 COMPLETE CBC W/AUTO DIFF WBC: CPT

## 2023-10-09 PROCEDURE — 99232 SBSQ HOSP IP/OBS MODERATE 35: CPT | Performed by: INTERNAL MEDICINE

## 2023-10-09 PROCEDURE — 87205 SMEAR GRAM STAIN: CPT

## 2023-10-09 RX ADMIN — WATER 2000 MG: 1 INJECTION INTRAMUSCULAR; INTRAVENOUS; SUBCUTANEOUS at 12:31

## 2023-10-09 RX ADMIN — WATER 2000 MG: 1 INJECTION INTRAMUSCULAR; INTRAVENOUS; SUBCUTANEOUS at 00:45

## 2023-10-09 RX ADMIN — INSULIN GLARGINE 25 UNITS: 100 INJECTION, SOLUTION SUBCUTANEOUS at 21:09

## 2023-10-09 RX ADMIN — ALLOPURINOL 100 MG: 100 TABLET ORAL at 10:07

## 2023-10-09 RX ADMIN — PANTOPRAZOLE SODIUM 40 MG: 40 TABLET, DELAYED RELEASE ORAL at 21:08

## 2023-10-09 RX ADMIN — APIXABAN 5 MG: 5 TABLET, FILM COATED ORAL at 10:07

## 2023-10-09 RX ADMIN — AMPICILLIN SODIUM 2000 MG: 2 INJECTION, POWDER, FOR SOLUTION INTRAVENOUS at 03:30

## 2023-10-09 RX ADMIN — PANTOPRAZOLE SODIUM 40 MG: 40 TABLET, DELAYED RELEASE ORAL at 10:07

## 2023-10-09 RX ADMIN — Medication 2 UNITS: at 18:42

## 2023-10-09 RX ADMIN — AMPICILLIN SODIUM 2000 MG: 2 INJECTION, POWDER, FOR SOLUTION INTRAVENOUS at 07:17

## 2023-10-09 RX ADMIN — SODIUM CHLORIDE, PRESERVATIVE FREE 10 ML: 5 INJECTION INTRAVENOUS at 21:08

## 2023-10-09 RX ADMIN — Medication 2 UNITS: at 12:31

## 2023-10-09 RX ADMIN — TORSEMIDE 40 MG: 20 TABLET ORAL at 10:07

## 2023-10-09 RX ADMIN — APIXABAN 5 MG: 5 TABLET, FILM COATED ORAL at 21:08

## 2023-10-09 RX ADMIN — AMPICILLIN SODIUM 2000 MG: 2 INJECTION, POWDER, FOR SOLUTION INTRAVENOUS at 12:33

## 2023-10-09 RX ADMIN — SODIUM CHLORIDE, PRESERVATIVE FREE 10 ML: 5 INJECTION INTRAVENOUS at 10:08

## 2023-10-09 RX ADMIN — ASPIRIN 81 MG: 81 TABLET, COATED ORAL at 10:07

## 2023-10-09 RX ADMIN — ATORVASTATIN CALCIUM 40 MG: 40 TABLET, FILM COATED ORAL at 10:07

## 2023-10-09 RX ADMIN — AMPICILLIN SODIUM 2000 MG: 2 INJECTION, POWDER, FOR SOLUTION INTRAVENOUS at 22:54

## 2023-10-09 RX ADMIN — AMPICILLIN SODIUM 2000 MG: 2 INJECTION, POWDER, FOR SOLUTION INTRAVENOUS at 18:40

## 2023-10-09 NOTE — WOUND CARE
WOCN Note:     Re-consult for bilateral feet wounds per Dr. Lorena Rodriguez is a 71 yo man with BPH, CAD s/p CABG x2, CKD Stage 3, paroxysmal atrial fibrillation on apixaban, h/o COVID-19 infection, T2DM, HTN, HLD, h/o aortic valve endocarditis s/p AV replacement was admitted on 2023 with SIRS, left foot blisters, R heel calluses, respiratory distress. Admitted on 23     Lab Results   Component Value Date/Time    WBC 9.4 10/09/2023 10:20 AM    LABA1C 6.2 (H) 10/02/2023 05:00 AM    POCGLU 215 (H) 10/09/2023 11:18 AM    POCGLU 197 (H) 10/09/2023 07:55 AM    HGB 14.5 10/09/2023 10:20 AM    HCT 43.1 10/09/2023 10:20 AM     10/09/2023 10:20 AM        Tobacco Use      Smoking status: Former        Packs/day: 4        Types: Cigarettes        Quit date: 2000        Years since quittin.7      Smokeless tobacco: Former     ADULT DIET; Regular; 5 carb choices (75 gm/meal)     Dietitian following  Will get Diabetes management consult     Assessment:   Alert and appropriately communicative. Denies pain. Met sitting up in chair. Wife at bedside. Mobile and Up ad gaby  Continent. Surface: SENIA mattress    1.  Evolving Wounds to Left Foot  Toes & Metatarsals:       Partially unroofed bulla to plantar great toe, wound bed pink, small serous exudate, slight erythema and warmth noted to dorsal aspect of great toe - applied xeroform and covered with gauze     Intact bulla to plantar 2nd toe  Intact callus to 1st Metatarsal head     All three above with deep maroon discoloration depicted above   Dark dry calluses to 4th metatarsal depicted above  Intact maroon area, irregular shape to medial plantar surface depicted above       Heel:                             Medial                                                                                                                    Lateral     Dark, dry, hardened cap to

## 2023-10-10 PROBLEM — A41.9 SEPSIS WITHOUT ACUTE ORGAN DYSFUNCTION (HCC): Status: ACTIVE | Noted: 2023-10-10

## 2023-10-10 LAB
ALBUMIN SERPL-MCNC: 2.6 G/DL (ref 3.5–5)
ALBUMIN/GLOB SERPL: 0.7 (ref 1.1–2.2)
ALP SERPL-CCNC: 46 U/L (ref 45–117)
ALT SERPL-CCNC: 9 U/L (ref 12–78)
ANION GAP SERPL CALC-SCNC: 10 MMOL/L (ref 5–15)
AST SERPL-CCNC: 10 U/L (ref 15–37)
BACTERIA SPEC CULT: NORMAL
BASOPHILS # BLD: 0.1 K/UL (ref 0–0.1)
BASOPHILS NFR BLD: 1 % (ref 0–1)
BILIRUB SERPL-MCNC: 0.5 MG/DL (ref 0.2–1)
BUN SERPL-MCNC: 23 MG/DL (ref 6–20)
BUN/CREAT SERPL: 13 (ref 12–20)
CALCIUM SERPL-MCNC: 8.7 MG/DL (ref 8.5–10.1)
CHLORIDE SERPL-SCNC: 101 MMOL/L (ref 97–108)
CO2 SERPL-SCNC: 25 MMOL/L (ref 21–32)
CREAT SERPL-MCNC: 1.82 MG/DL (ref 0.7–1.3)
DIFFERENTIAL METHOD BLD: ABNORMAL
EOSINOPHIL # BLD: 0.3 K/UL (ref 0–0.4)
EOSINOPHIL NFR BLD: 3 % (ref 0–7)
ERYTHROCYTE [DISTWIDTH] IN BLOOD BY AUTOMATED COUNT: 13.8 % (ref 11.5–14.5)
GLOBULIN SER CALC-MCNC: 3.5 G/DL (ref 2–4)
GLUCOSE BLD STRIP.AUTO-MCNC: 150 MG/DL (ref 65–117)
GLUCOSE BLD STRIP.AUTO-MCNC: 158 MG/DL (ref 65–117)
GLUCOSE BLD STRIP.AUTO-MCNC: 172 MG/DL (ref 65–117)
GLUCOSE BLD STRIP.AUTO-MCNC: 181 MG/DL (ref 65–117)
GLUCOSE SERPL-MCNC: 189 MG/DL (ref 65–100)
HCT VFR BLD AUTO: 43.8 % (ref 36.6–50.3)
HGB BLD-MCNC: 14.5 G/DL (ref 12.1–17)
IMM GRANULOCYTES # BLD AUTO: 0.1 K/UL (ref 0–0.04)
IMM GRANULOCYTES NFR BLD AUTO: 1 % (ref 0–0.5)
LYMPHOCYTES # BLD: 1.6 K/UL (ref 0.8–3.5)
LYMPHOCYTES NFR BLD: 15 % (ref 12–49)
MCH RBC QN AUTO: 29.8 PG (ref 26–34)
MCHC RBC AUTO-ENTMCNC: 33.1 G/DL (ref 30–36.5)
MCV RBC AUTO: 90.1 FL (ref 80–99)
MONOCYTES # BLD: 0.8 K/UL (ref 0–1)
MONOCYTES NFR BLD: 7 % (ref 5–13)
NEUTS SEG # BLD: 7.8 K/UL (ref 1.8–8)
NEUTS SEG NFR BLD: 73 % (ref 32–75)
NRBC # BLD: 0 K/UL (ref 0–0.01)
NRBC BLD-RTO: 0 PER 100 WBC
PLATELET # BLD AUTO: 226 K/UL (ref 150–400)
PMV BLD AUTO: 9.8 FL (ref 8.9–12.9)
POTASSIUM SERPL-SCNC: 3 MMOL/L (ref 3.5–5.1)
PROT SERPL-MCNC: 6.1 G/DL (ref 6.4–8.2)
RBC # BLD AUTO: 4.86 M/UL (ref 4.1–5.7)
SERVICE CMNT-IMP: ABNORMAL
SERVICE CMNT-IMP: NORMAL
SODIUM SERPL-SCNC: 136 MMOL/L (ref 136–145)
WBC # BLD AUTO: 10.5 K/UL (ref 4.1–11.1)

## 2023-10-10 PROCEDURE — 6370000000 HC RX 637 (ALT 250 FOR IP): Performed by: PODIATRIST

## 2023-10-10 PROCEDURE — 2580000003 HC RX 258: Performed by: STUDENT IN AN ORGANIZED HEALTH CARE EDUCATION/TRAINING PROGRAM

## 2023-10-10 PROCEDURE — 6370000000 HC RX 637 (ALT 250 FOR IP): Performed by: HOSPITALIST

## 2023-10-10 PROCEDURE — 99221 1ST HOSP IP/OBS SF/LOW 40: CPT

## 2023-10-10 PROCEDURE — 36415 COLL VENOUS BLD VENIPUNCTURE: CPT

## 2023-10-10 PROCEDURE — 6360000002 HC RX W HCPCS: Performed by: INTERNAL MEDICINE

## 2023-10-10 PROCEDURE — 2060000000 HC ICU INTERMEDIATE R&B

## 2023-10-10 PROCEDURE — 2580000003 HC RX 258: Performed by: INTERNAL MEDICINE

## 2023-10-10 PROCEDURE — 6370000000 HC RX 637 (ALT 250 FOR IP): Performed by: STUDENT IN AN ORGANIZED HEALTH CARE EDUCATION/TRAINING PROGRAM

## 2023-10-10 PROCEDURE — 80053 COMPREHEN METABOLIC PANEL: CPT

## 2023-10-10 PROCEDURE — 6370000000 HC RX 637 (ALT 250 FOR IP)

## 2023-10-10 PROCEDURE — 82962 GLUCOSE BLOOD TEST: CPT

## 2023-10-10 PROCEDURE — 85025 COMPLETE CBC W/AUTO DIFF WBC: CPT

## 2023-10-10 RX ORDER — INSULIN LISPRO 100 [IU]/ML
5 INJECTION, SOLUTION INTRAVENOUS; SUBCUTANEOUS
Status: DISCONTINUED | OUTPATIENT
Start: 2023-10-10 | End: 2023-10-12

## 2023-10-10 RX ORDER — DEXTROSE MONOHYDRATE 100 MG/ML
INJECTION, SOLUTION INTRAVENOUS CONTINUOUS PRN
Status: DISCONTINUED | OUTPATIENT
Start: 2023-10-10 | End: 2023-10-17 | Stop reason: HOSPADM

## 2023-10-10 RX ORDER — POTASSIUM CHLORIDE 750 MG/1
40 TABLET, FILM COATED, EXTENDED RELEASE ORAL 3 TIMES DAILY
Status: DISCONTINUED | OUTPATIENT
Start: 2023-10-10 | End: 2023-10-15

## 2023-10-10 RX ADMIN — AMPICILLIN SODIUM 2000 MG: 2 INJECTION, POWDER, FOR SOLUTION INTRAVENOUS at 03:11

## 2023-10-10 RX ADMIN — PANTOPRAZOLE SODIUM 40 MG: 40 TABLET, DELAYED RELEASE ORAL at 08:19

## 2023-10-10 RX ADMIN — Medication 5 UNITS: at 12:25

## 2023-10-10 RX ADMIN — PANTOPRAZOLE SODIUM 40 MG: 40 TABLET, DELAYED RELEASE ORAL at 21:13

## 2023-10-10 RX ADMIN — AMPICILLIN SODIUM 2000 MG: 2 INJECTION, POWDER, FOR SOLUTION INTRAVENOUS at 07:19

## 2023-10-10 RX ADMIN — ATORVASTATIN CALCIUM 40 MG: 40 TABLET, FILM COATED ORAL at 08:19

## 2023-10-10 RX ADMIN — AMPICILLIN SODIUM 2000 MG: 2 INJECTION, POWDER, FOR SOLUTION INTRAVENOUS at 19:48

## 2023-10-10 RX ADMIN — AMPICILLIN SODIUM 2000 MG: 2 INJECTION, POWDER, FOR SOLUTION INTRAVENOUS at 11:23

## 2023-10-10 RX ADMIN — SODIUM CHLORIDE, PRESERVATIVE FREE 10 ML: 5 INJECTION INTRAVENOUS at 21:14

## 2023-10-10 RX ADMIN — INSULIN GLARGINE 25 UNITS: 100 INJECTION, SOLUTION SUBCUTANEOUS at 21:14

## 2023-10-10 RX ADMIN — AMPICILLIN SODIUM 2000 MG: 2 INJECTION, POWDER, FOR SOLUTION INTRAVENOUS at 23:32

## 2023-10-10 RX ADMIN — COLLAGENASE SANTYL: 250 OINTMENT TOPICAL at 08:19

## 2023-10-10 RX ADMIN — APIXABAN 5 MG: 5 TABLET, FILM COATED ORAL at 08:19

## 2023-10-10 RX ADMIN — APIXABAN 5 MG: 5 TABLET, FILM COATED ORAL at 21:13

## 2023-10-10 RX ADMIN — ASPIRIN 81 MG: 81 TABLET, COATED ORAL at 08:18

## 2023-10-10 RX ADMIN — POTASSIUM CHLORIDE 40 MEQ: 750 TABLET, FILM COATED, EXTENDED RELEASE ORAL at 08:18

## 2023-10-10 RX ADMIN — Medication 5 UNITS: at 17:51

## 2023-10-10 RX ADMIN — POTASSIUM CHLORIDE 40 MEQ: 750 TABLET, FILM COATED, EXTENDED RELEASE ORAL at 21:13

## 2023-10-10 RX ADMIN — AMPICILLIN SODIUM 2000 MG: 2 INJECTION, POWDER, FOR SOLUTION INTRAVENOUS at 14:55

## 2023-10-10 RX ADMIN — WATER 2000 MG: 1 INJECTION INTRAMUSCULAR; INTRAVENOUS; SUBCUTANEOUS at 12:24

## 2023-10-10 RX ADMIN — POTASSIUM CHLORIDE 40 MEQ: 750 TABLET, FILM COATED, EXTENDED RELEASE ORAL at 14:55

## 2023-10-10 RX ADMIN — TORSEMIDE 40 MG: 20 TABLET ORAL at 08:33

## 2023-10-10 RX ADMIN — SODIUM CHLORIDE, PRESERVATIVE FREE 10 ML: 5 INJECTION INTRAVENOUS at 08:20

## 2023-10-10 RX ADMIN — WATER 2000 MG: 1 INJECTION INTRAMUSCULAR; INTRAVENOUS; SUBCUTANEOUS at 00:32

## 2023-10-10 ASSESSMENT — PAIN SCALES - GENERAL: PAINLEVEL_OUTOF10: 0

## 2023-10-11 ENCOUNTER — ANESTHESIA EVENT (OUTPATIENT)
Facility: HOSPITAL | Age: 73
End: 2023-10-11
Payer: MEDICARE

## 2023-10-11 ENCOUNTER — ANESTHESIA (OUTPATIENT)
Facility: HOSPITAL | Age: 73
End: 2023-10-11
Payer: MEDICARE

## 2023-10-11 ENCOUNTER — PREP FOR PROCEDURE (OUTPATIENT)
Facility: HOSPITAL | Age: 73
End: 2023-10-11

## 2023-10-11 LAB
ALBUMIN SERPL-MCNC: 2.4 G/DL (ref 3.5–5)
ALBUMIN/GLOB SERPL: 0.7 (ref 1.1–2.2)
ALP SERPL-CCNC: 51 U/L (ref 45–117)
ALT SERPL-CCNC: 8 U/L (ref 12–78)
ANION GAP SERPL CALC-SCNC: 6 MMOL/L (ref 5–15)
AST SERPL-CCNC: 8 U/L (ref 15–37)
BASOPHILS # BLD: 0.1 K/UL (ref 0–0.1)
BASOPHILS NFR BLD: 1 % (ref 0–1)
BILIRUB SERPL-MCNC: 0.5 MG/DL (ref 0.2–1)
BUN SERPL-MCNC: 23 MG/DL (ref 6–20)
BUN/CREAT SERPL: 12 (ref 12–20)
CALCIUM SERPL-MCNC: 8.7 MG/DL (ref 8.5–10.1)
CHLORIDE SERPL-SCNC: 102 MMOL/L (ref 97–108)
CO2 SERPL-SCNC: 28 MMOL/L (ref 21–32)
CREAT SERPL-MCNC: 1.89 MG/DL (ref 0.7–1.3)
DIFFERENTIAL METHOD BLD: ABNORMAL
EOSINOPHIL # BLD: 0.2 K/UL (ref 0–0.4)
EOSINOPHIL NFR BLD: 3 % (ref 0–7)
ERYTHROCYTE [DISTWIDTH] IN BLOOD BY AUTOMATED COUNT: 14 % (ref 11.5–14.5)
GLOBULIN SER CALC-MCNC: 3.5 G/DL (ref 2–4)
GLUCOSE BLD STRIP.AUTO-MCNC: 103 MG/DL (ref 65–117)
GLUCOSE BLD STRIP.AUTO-MCNC: 108 MG/DL (ref 65–117)
GLUCOSE BLD STRIP.AUTO-MCNC: 111 MG/DL (ref 65–117)
GLUCOSE BLD STRIP.AUTO-MCNC: 179 MG/DL (ref 65–117)
GLUCOSE BLD STRIP.AUTO-MCNC: 84 MG/DL (ref 65–117)
GLUCOSE SERPL-MCNC: 144 MG/DL (ref 65–100)
HCT VFR BLD AUTO: 46 % (ref 36.6–50.3)
HGB BLD-MCNC: 14.4 G/DL (ref 12.1–17)
IMM GRANULOCYTES # BLD AUTO: 0.1 K/UL (ref 0–0.04)
IMM GRANULOCYTES NFR BLD AUTO: 1 % (ref 0–0.5)
LYMPHOCYTES # BLD: 1.3 K/UL (ref 0.8–3.5)
LYMPHOCYTES NFR BLD: 16 % (ref 12–49)
MCH RBC QN AUTO: 30 PG (ref 26–34)
MCHC RBC AUTO-ENTMCNC: 31.3 G/DL (ref 30–36.5)
MCV RBC AUTO: 95.8 FL (ref 80–99)
MONOCYTES # BLD: 0.7 K/UL (ref 0–1)
MONOCYTES NFR BLD: 8 % (ref 5–13)
NEUTS SEG # BLD: 6 K/UL (ref 1.8–8)
NEUTS SEG NFR BLD: 71 % (ref 32–75)
NRBC # BLD: 0 K/UL (ref 0–0.01)
NRBC BLD-RTO: 0 PER 100 WBC
PLATELET # BLD AUTO: 215 K/UL (ref 150–400)
PMV BLD AUTO: 9.6 FL (ref 8.9–12.9)
POTASSIUM SERPL-SCNC: 3.6 MMOL/L (ref 3.5–5.1)
PROT SERPL-MCNC: 5.9 G/DL (ref 6.4–8.2)
RBC # BLD AUTO: 4.8 M/UL (ref 4.1–5.7)
SERVICE CMNT-IMP: ABNORMAL
SERVICE CMNT-IMP: NORMAL
SODIUM SERPL-SCNC: 136 MMOL/L (ref 136–145)
WBC # BLD AUTO: 8.4 K/UL (ref 4.1–11.1)

## 2023-10-11 PROCEDURE — 2580000003 HC RX 258: Performed by: INTERNAL MEDICINE

## 2023-10-11 PROCEDURE — 6360000002 HC RX W HCPCS: Performed by: INTERNAL MEDICINE

## 2023-10-11 PROCEDURE — 6370000000 HC RX 637 (ALT 250 FOR IP)

## 2023-10-11 PROCEDURE — 80053 COMPREHEN METABOLIC PANEL: CPT

## 2023-10-11 PROCEDURE — 6370000000 HC RX 637 (ALT 250 FOR IP): Performed by: HOSPITALIST

## 2023-10-11 PROCEDURE — 6370000000 HC RX 637 (ALT 250 FOR IP): Performed by: STUDENT IN AN ORGANIZED HEALTH CARE EDUCATION/TRAINING PROGRAM

## 2023-10-11 PROCEDURE — 36415 COLL VENOUS BLD VENIPUNCTURE: CPT

## 2023-10-11 PROCEDURE — 85025 COMPLETE CBC W/AUTO DIFF WBC: CPT

## 2023-10-11 PROCEDURE — 2580000003 HC RX 258: Performed by: STUDENT IN AN ORGANIZED HEALTH CARE EDUCATION/TRAINING PROGRAM

## 2023-10-11 PROCEDURE — 2060000000 HC ICU INTERMEDIATE R&B

## 2023-10-11 PROCEDURE — 82962 GLUCOSE BLOOD TEST: CPT

## 2023-10-11 RX ORDER — SODIUM CHLORIDE 0.9 % (FLUSH) 0.9 %
5-40 SYRINGE (ML) INJECTION PRN
Status: CANCELLED | OUTPATIENT
Start: 2023-10-11

## 2023-10-11 RX ORDER — SODIUM CHLORIDE 9 MG/ML
INJECTION, SOLUTION INTRAVENOUS PRN
Status: CANCELLED | OUTPATIENT
Start: 2023-10-11

## 2023-10-11 RX ORDER — SODIUM CHLORIDE 0.9 % (FLUSH) 0.9 %
5-40 SYRINGE (ML) INJECTION EVERY 12 HOURS SCHEDULED
Status: CANCELLED | OUTPATIENT
Start: 2023-10-11

## 2023-10-11 RX ORDER — INSULIN GLARGINE 100 [IU]/ML
22 INJECTION, SOLUTION SUBCUTANEOUS NIGHTLY
Status: DISCONTINUED | OUTPATIENT
Start: 2023-10-11 | End: 2023-10-13

## 2023-10-11 RX ADMIN — POTASSIUM CHLORIDE 40 MEQ: 750 TABLET, FILM COATED, EXTENDED RELEASE ORAL at 20:56

## 2023-10-11 RX ADMIN — AMPICILLIN SODIUM 2000 MG: 2 INJECTION, POWDER, FOR SOLUTION INTRAVENOUS at 07:18

## 2023-10-11 RX ADMIN — AMPICILLIN SODIUM 2000 MG: 2 INJECTION, POWDER, FOR SOLUTION INTRAVENOUS at 20:00

## 2023-10-11 RX ADMIN — AMPICILLIN SODIUM 2000 MG: 2 INJECTION, POWDER, FOR SOLUTION INTRAVENOUS at 03:23

## 2023-10-11 RX ADMIN — WATER 2000 MG: 1 INJECTION INTRAMUSCULAR; INTRAVENOUS; SUBCUTANEOUS at 23:46

## 2023-10-11 RX ADMIN — SODIUM CHLORIDE, PRESERVATIVE FREE 10 ML: 5 INJECTION INTRAVENOUS at 09:24

## 2023-10-11 RX ADMIN — ATORVASTATIN CALCIUM 40 MG: 40 TABLET, FILM COATED ORAL at 09:23

## 2023-10-11 RX ADMIN — TORSEMIDE 40 MG: 20 TABLET ORAL at 11:49

## 2023-10-11 RX ADMIN — PANTOPRAZOLE SODIUM 40 MG: 40 TABLET, DELAYED RELEASE ORAL at 20:56

## 2023-10-11 RX ADMIN — ALLOPURINOL 100 MG: 100 TABLET ORAL at 09:23

## 2023-10-11 RX ADMIN — PANTOPRAZOLE SODIUM 40 MG: 40 TABLET, DELAYED RELEASE ORAL at 09:23

## 2023-10-11 RX ADMIN — AMPICILLIN SODIUM 2000 MG: 2 INJECTION, POWDER, FOR SOLUTION INTRAVENOUS at 15:12

## 2023-10-11 RX ADMIN — SODIUM CHLORIDE, PRESERVATIVE FREE 10 ML: 5 INJECTION INTRAVENOUS at 21:11

## 2023-10-11 RX ADMIN — POTASSIUM CHLORIDE 40 MEQ: 750 TABLET, FILM COATED, EXTENDED RELEASE ORAL at 09:23

## 2023-10-11 RX ADMIN — WATER 2000 MG: 1 INJECTION INTRAMUSCULAR; INTRAVENOUS; SUBCUTANEOUS at 00:27

## 2023-10-11 RX ADMIN — AMPICILLIN SODIUM 2000 MG: 2 INJECTION, POWDER, FOR SOLUTION INTRAVENOUS at 11:30

## 2023-10-11 RX ADMIN — INSULIN GLARGINE 22 UNITS: 100 INJECTION, SOLUTION SUBCUTANEOUS at 20:56

## 2023-10-11 RX ADMIN — COLLAGENASE SANTYL: 250 OINTMENT TOPICAL at 09:24

## 2023-10-11 RX ADMIN — AMPICILLIN SODIUM 2000 MG: 2 INJECTION, POWDER, FOR SOLUTION INTRAVENOUS at 23:44

## 2023-10-11 RX ADMIN — WATER 2000 MG: 1 INJECTION INTRAMUSCULAR; INTRAVENOUS; SUBCUTANEOUS at 12:33

## 2023-10-11 NOTE — ADT AUTH CERT
shoe, activity to maintain strength and endurance while hospitalized, and diabetic foot care. Additional acute PT is not indicated. Will sign off. Please consult again if needs arise. Recommend: No skilled physical therapy  [facility]: 24/24      OT:   The patient is primarily limited by BLE feet wounds. Per podiatry, patient is WBAT LLE with surgical shoe for support. Provided with surgical shoe and adjusted fit for stability, patient able to don with min A. Patient able to complete all functional mobility and ADL with overall independence. Educated on importance of skin checks and wearing shoes to protect the skin and wounds, patient verbalized understanding. Anticipate no OT needs at this time, will discontinue OT orders, please reconsult if change in functional status.    Recommend with staff: OOB 3x daily for meals, functional mobility to bathroom with staff assist  Recommendation for discharge: No skilled occupational therapy  [facility]: 22/24

## 2023-10-11 NOTE — CARE COORDINATION
Brief Note. Home IV abx orders received. Referral sent to [facility] via [address]. CM continuing to follow.     Bettye Conway [facility], [address] [facility]

## 2023-10-12 LAB
ALBUMIN SERPL-MCNC: 2.6 G/DL (ref 3.5–5)
ALBUMIN/GLOB SERPL: 0.8 (ref 1.1–2.2)
ALP SERPL-CCNC: 49 U/L (ref 45–117)
ALT SERPL-CCNC: 7 U/L (ref 12–78)
ANION GAP SERPL CALC-SCNC: 7 MMOL/L (ref 5–15)
AST SERPL-CCNC: 11 U/L (ref 15–37)
BACTERIA SPEC CULT: ABNORMAL
BACTERIA SPEC CULT: NORMAL
BASOPHILS # BLD: 0.1 K/UL (ref 0–0.1)
BASOPHILS NFR BLD: 1 % (ref 0–1)
BILIRUB SERPL-MCNC: 0.5 MG/DL (ref 0.2–1)
BUN SERPL-MCNC: 20 MG/DL (ref 6–20)
BUN/CREAT SERPL: 11 (ref 12–20)
CALCIUM SERPL-MCNC: 8.5 MG/DL (ref 8.5–10.1)
CHLORIDE SERPL-SCNC: 101 MMOL/L (ref 97–108)
CO2 SERPL-SCNC: 27 MMOL/L (ref 21–32)
CREAT SERPL-MCNC: 1.82 MG/DL (ref 0.7–1.3)
DIFFERENTIAL METHOD BLD: ABNORMAL
EOSINOPHIL # BLD: 0.3 K/UL (ref 0–0.4)
EOSINOPHIL NFR BLD: 3 % (ref 0–7)
ERYTHROCYTE [DISTWIDTH] IN BLOOD BY AUTOMATED COUNT: 13.7 % (ref 11.5–14.5)
GLOBULIN SER CALC-MCNC: 3.3 G/DL (ref 2–4)
GLUCOSE BLD STRIP.AUTO-MCNC: 120 MG/DL (ref 65–117)
GLUCOSE BLD STRIP.AUTO-MCNC: 141 MG/DL (ref 65–117)
GLUCOSE BLD STRIP.AUTO-MCNC: 154 MG/DL (ref 65–117)
GLUCOSE BLD STRIP.AUTO-MCNC: 170 MG/DL (ref 65–117)
GLUCOSE BLD STRIP.AUTO-MCNC: 191 MG/DL (ref 65–117)
GLUCOSE SERPL-MCNC: 155 MG/DL (ref 65–100)
GRAM STN SPEC: ABNORMAL
GRAM STN SPEC: ABNORMAL
GRAM STN SPEC: NORMAL
GRAM STN SPEC: NORMAL
HCT VFR BLD AUTO: 42.6 % (ref 36.6–50.3)
HGB BLD-MCNC: 14.1 G/DL (ref 12.1–17)
IMM GRANULOCYTES # BLD AUTO: 0.1 K/UL (ref 0–0.04)
IMM GRANULOCYTES NFR BLD AUTO: 1 % (ref 0–0.5)
LYMPHOCYTES # BLD: 1.4 K/UL (ref 0.8–3.5)
LYMPHOCYTES NFR BLD: 16 % (ref 12–49)
MCH RBC QN AUTO: 30.2 PG (ref 26–34)
MCHC RBC AUTO-ENTMCNC: 33.1 G/DL (ref 30–36.5)
MCV RBC AUTO: 91.2 FL (ref 80–99)
MONOCYTES # BLD: 0.8 K/UL (ref 0–1)
MONOCYTES NFR BLD: 9 % (ref 5–13)
NEUTS SEG # BLD: 6.4 K/UL (ref 1.8–8)
NEUTS SEG NFR BLD: 71 % (ref 32–75)
NRBC # BLD: 0 K/UL (ref 0–0.01)
NRBC BLD-RTO: 0 PER 100 WBC
PLATELET # BLD AUTO: 217 K/UL (ref 150–400)
PMV BLD AUTO: 9.6 FL (ref 8.9–12.9)
POTASSIUM SERPL-SCNC: 3.6 MMOL/L (ref 3.5–5.1)
PROT SERPL-MCNC: 5.9 G/DL (ref 6.4–8.2)
RBC # BLD AUTO: 4.67 M/UL (ref 4.1–5.7)
SERVICE CMNT-IMP: ABNORMAL
SERVICE CMNT-IMP: NORMAL
SODIUM SERPL-SCNC: 135 MMOL/L (ref 136–145)
WBC # BLD AUTO: 9.1 K/UL (ref 4.1–11.1)

## 2023-10-12 PROCEDURE — 6360000002 HC RX W HCPCS: Performed by: INTERNAL MEDICINE

## 2023-10-12 PROCEDURE — 6370000000 HC RX 637 (ALT 250 FOR IP): Performed by: INTERNAL MEDICINE

## 2023-10-12 PROCEDURE — 0JBR0ZZ EXCISION OF LEFT FOOT SUBCUTANEOUS TISSUE AND FASCIA, OPEN APPROACH: ICD-10-PCS | Performed by: PODIATRIST

## 2023-10-12 PROCEDURE — 7100000001 HC PACU RECOVERY - ADDTL 15 MIN: Performed by: PODIATRIST

## 2023-10-12 PROCEDURE — 6370000000 HC RX 637 (ALT 250 FOR IP): Performed by: HOSPITALIST

## 2023-10-12 PROCEDURE — 6370000000 HC RX 637 (ALT 250 FOR IP): Performed by: STUDENT IN AN ORGANIZED HEALTH CARE EDUCATION/TRAINING PROGRAM

## 2023-10-12 PROCEDURE — 36415 COLL VENOUS BLD VENIPUNCTURE: CPT

## 2023-10-12 PROCEDURE — 2060000000 HC ICU INTERMEDIATE R&B

## 2023-10-12 PROCEDURE — 82962 GLUCOSE BLOOD TEST: CPT

## 2023-10-12 PROCEDURE — 80053 COMPREHEN METABOLIC PANEL: CPT

## 2023-10-12 PROCEDURE — 3700000000 HC ANESTHESIA ATTENDED CARE: Performed by: PODIATRIST

## 2023-10-12 PROCEDURE — 3600000002 HC SURGERY LEVEL 2 BASE: Performed by: PODIATRIST

## 2023-10-12 PROCEDURE — 2580000003 HC RX 258: Performed by: ANESTHESIOLOGY

## 2023-10-12 PROCEDURE — 99231 SBSQ HOSP IP/OBS SF/LOW 25: CPT

## 2023-10-12 PROCEDURE — 2580000003 HC RX 258: Performed by: STUDENT IN AN ORGANIZED HEALTH CARE EDUCATION/TRAINING PROGRAM

## 2023-10-12 PROCEDURE — 6360000002 HC RX W HCPCS: Performed by: PODIATRIST

## 2023-10-12 PROCEDURE — 3700000001 HC ADD 15 MINUTES (ANESTHESIA): Performed by: PODIATRIST

## 2023-10-12 PROCEDURE — 2500000003 HC RX 250 WO HCPCS: Performed by: NURSE ANESTHETIST, CERTIFIED REGISTERED

## 2023-10-12 PROCEDURE — 7100000000 HC PACU RECOVERY - FIRST 15 MIN: Performed by: PODIATRIST

## 2023-10-12 PROCEDURE — 3600000012 HC SURGERY LEVEL 2 ADDTL 15MIN: Performed by: PODIATRIST

## 2023-10-12 PROCEDURE — 2580000003 HC RX 258: Performed by: INTERNAL MEDICINE

## 2023-10-12 PROCEDURE — 85025 COMPLETE CBC W/AUTO DIFF WBC: CPT

## 2023-10-12 PROCEDURE — 6360000002 HC RX W HCPCS: Performed by: NURSE ANESTHETIST, CERTIFIED REGISTERED

## 2023-10-12 PROCEDURE — 2580000003 HC RX 258: Performed by: PODIATRIST

## 2023-10-12 PROCEDURE — 6370000000 HC RX 637 (ALT 250 FOR IP)

## 2023-10-12 PROCEDURE — 2709999900 HC NON-CHARGEABLE SUPPLY: Performed by: PODIATRIST

## 2023-10-12 DEVICE — PURAPLY™ ANTIMICROBIAL WOUND MATRIX CONSISTS OF A COLLAGEN SHEET COATED WITH POLYHEXAMETHYLENEBIGUANIDE HYDROCHLORIDE (PHMB) INTENDED FOR THE MANAGEMENT OF WOUNDS. PURAPLY™ ANTIMICROBIAL WOUND MATRIX IS SUPPLIED DRY IN SHEET FORM. THE DEVICE IS PACKAGED IN STERILE, SEALED SINGLE POUCHES.
Type: IMPLANTABLE DEVICE | Status: FUNCTIONAL
Brand: PURAPLY™ ANTIMICROBIAL WOUND MATRIX

## 2023-10-12 RX ORDER — SODIUM CHLORIDE 0.9 % (FLUSH) 0.9 %
5-40 SYRINGE (ML) INJECTION PRN
Status: DISCONTINUED | OUTPATIENT
Start: 2023-10-12 | End: 2023-10-12 | Stop reason: HOSPADM

## 2023-10-12 RX ORDER — SODIUM CHLORIDE 0.9 % (FLUSH) 0.9 %
5-40 SYRINGE (ML) INJECTION EVERY 12 HOURS SCHEDULED
Status: CANCELLED | OUTPATIENT
Start: 2023-10-12

## 2023-10-12 RX ORDER — SODIUM CHLORIDE 0.9 % (FLUSH) 0.9 %
5-40 SYRINGE (ML) INJECTION EVERY 12 HOURS SCHEDULED
Status: DISCONTINUED | OUTPATIENT
Start: 2023-10-12 | End: 2023-10-12 | Stop reason: HOSPADM

## 2023-10-12 RX ORDER — FENTANYL CITRATE 50 UG/ML
50 INJECTION, SOLUTION INTRAMUSCULAR; INTRAVENOUS
Status: CANCELLED | OUTPATIENT
Start: 2023-10-12 | End: 2023-10-13

## 2023-10-12 RX ORDER — SODIUM CHLORIDE, SODIUM LACTATE, POTASSIUM CHLORIDE, CALCIUM CHLORIDE 600; 310; 30; 20 MG/100ML; MG/100ML; MG/100ML; MG/100ML
INJECTION, SOLUTION INTRAVENOUS CONTINUOUS
Status: CANCELLED | OUTPATIENT
Start: 2023-10-12

## 2023-10-12 RX ORDER — BUPIVACAINE HYDROCHLORIDE 5 MG/ML
INJECTION, SOLUTION EPIDURAL; INTRACAUDAL PRN
Status: DISCONTINUED | OUTPATIENT
Start: 2023-10-12 | End: 2023-10-12 | Stop reason: HOSPADM

## 2023-10-12 RX ORDER — DIPHENHYDRAMINE HYDROCHLORIDE 50 MG/ML
12.5 INJECTION INTRAMUSCULAR; INTRAVENOUS
Status: DISCONTINUED | OUTPATIENT
Start: 2023-10-12 | End: 2023-10-12 | Stop reason: HOSPADM

## 2023-10-12 RX ORDER — OXYCODONE HYDROCHLORIDE 5 MG/1
5 TABLET ORAL
Status: DISCONTINUED | OUTPATIENT
Start: 2023-10-12 | End: 2023-10-12 | Stop reason: HOSPADM

## 2023-10-12 RX ORDER — PROCHLORPERAZINE EDISYLATE 5 MG/ML
5 INJECTION INTRAMUSCULAR; INTRAVENOUS
Status: DISCONTINUED | OUTPATIENT
Start: 2023-10-12 | End: 2023-10-12 | Stop reason: HOSPADM

## 2023-10-12 RX ORDER — SODIUM CHLORIDE 9 MG/ML
INJECTION, SOLUTION INTRAVENOUS PRN
Status: CANCELLED | OUTPATIENT
Start: 2023-10-12

## 2023-10-12 RX ORDER — SODIUM CHLORIDE 9 MG/ML
INJECTION, SOLUTION INTRAVENOUS CONTINUOUS
Status: CANCELLED | OUTPATIENT
Start: 2023-10-12

## 2023-10-12 RX ORDER — SODIUM CHLORIDE 9 MG/ML
INJECTION, SOLUTION INTRAVENOUS PRN
Status: DISCONTINUED | OUTPATIENT
Start: 2023-10-12 | End: 2023-10-12 | Stop reason: HOSPADM

## 2023-10-12 RX ORDER — ONDANSETRON 2 MG/ML
4 INJECTION INTRAMUSCULAR; INTRAVENOUS
Status: DISCONTINUED | OUTPATIENT
Start: 2023-10-12 | End: 2023-10-12 | Stop reason: HOSPADM

## 2023-10-12 RX ORDER — FENTANYL CITRATE 50 UG/ML
25 INJECTION, SOLUTION INTRAMUSCULAR; INTRAVENOUS
Status: CANCELLED | OUTPATIENT
Start: 2023-10-12 | End: 2023-10-13

## 2023-10-12 RX ORDER — FENTANYL CITRATE 50 UG/ML
25 INJECTION, SOLUTION INTRAMUSCULAR; INTRAVENOUS EVERY 5 MIN PRN
Status: DISCONTINUED | OUTPATIENT
Start: 2023-10-12 | End: 2023-10-12 | Stop reason: HOSPADM

## 2023-10-12 RX ORDER — LABETALOL HYDROCHLORIDE 5 MG/ML
10 INJECTION, SOLUTION INTRAVENOUS
Status: DISCONTINUED | OUTPATIENT
Start: 2023-10-12 | End: 2023-10-12 | Stop reason: HOSPADM

## 2023-10-12 RX ORDER — MEPERIDINE HYDROCHLORIDE 25 MG/ML
12.5 INJECTION INTRAMUSCULAR; INTRAVENOUS; SUBCUTANEOUS EVERY 5 MIN PRN
Status: DISCONTINUED | OUTPATIENT
Start: 2023-10-12 | End: 2023-10-12 | Stop reason: HOSPADM

## 2023-10-12 RX ORDER — MIDAZOLAM HYDROCHLORIDE 2 MG/2ML
2 INJECTION, SOLUTION INTRAMUSCULAR; INTRAVENOUS
Status: CANCELLED | OUTPATIENT
Start: 2023-10-12 | End: 2023-10-13

## 2023-10-12 RX ORDER — ONDANSETRON 2 MG/ML
INJECTION INTRAMUSCULAR; INTRAVENOUS PRN
Status: DISCONTINUED | OUTPATIENT
Start: 2023-10-12 | End: 2023-10-12 | Stop reason: SDUPTHER

## 2023-10-12 RX ORDER — LIDOCAINE HYDROCHLORIDE 10 MG/ML
1 INJECTION, SOLUTION EPIDURAL; INFILTRATION; INTRACAUDAL; PERINEURAL
Status: CANCELLED | OUTPATIENT
Start: 2023-10-12 | End: 2023-10-13

## 2023-10-12 RX ORDER — MIDAZOLAM HYDROCHLORIDE 2 MG/2ML
2 INJECTION, SOLUTION INTRAMUSCULAR; INTRAVENOUS
Status: DISCONTINUED | OUTPATIENT
Start: 2023-10-12 | End: 2023-10-12 | Stop reason: HOSPADM

## 2023-10-12 RX ORDER — ACETAMINOPHEN 325 MG/1
650 TABLET ORAL ONCE
Status: CANCELLED | OUTPATIENT
Start: 2023-10-12 | End: 2023-10-12

## 2023-10-12 RX ORDER — FENTANYL CITRATE 50 UG/ML
INJECTION, SOLUTION INTRAMUSCULAR; INTRAVENOUS PRN
Status: DISCONTINUED | OUTPATIENT
Start: 2023-10-12 | End: 2023-10-12 | Stop reason: SDUPTHER

## 2023-10-12 RX ORDER — SODIUM CHLORIDE 0.9 % (FLUSH) 0.9 %
5-40 SYRINGE (ML) INJECTION PRN
Status: CANCELLED | OUTPATIENT
Start: 2023-10-12

## 2023-10-12 RX ORDER — INSULIN LISPRO 100 [IU]/ML
5 INJECTION, SOLUTION INTRAVENOUS; SUBCUTANEOUS
Status: DISCONTINUED | OUTPATIENT
Start: 2023-10-12 | End: 2023-10-17 | Stop reason: HOSPADM

## 2023-10-12 RX ORDER — HYDROMORPHONE HYDROCHLORIDE 1 MG/ML
0.5 INJECTION, SOLUTION INTRAMUSCULAR; INTRAVENOUS; SUBCUTANEOUS EVERY 5 MIN PRN
Status: DISCONTINUED | OUTPATIENT
Start: 2023-10-12 | End: 2023-10-12 | Stop reason: HOSPADM

## 2023-10-12 RX ORDER — DEXMEDETOMIDINE HYDROCHLORIDE 100 UG/ML
INJECTION, SOLUTION INTRAVENOUS PRN
Status: DISCONTINUED | OUTPATIENT
Start: 2023-10-12 | End: 2023-10-12 | Stop reason: SDUPTHER

## 2023-10-12 RX ORDER — PHENYLEPHRINE HCL IN 0.9% NACL 0.4MG/10ML
SYRINGE (ML) INTRAVENOUS PRN
Status: DISCONTINUED | OUTPATIENT
Start: 2023-10-12 | End: 2023-10-12 | Stop reason: SDUPTHER

## 2023-10-12 RX ORDER — PROPOFOL 10 MG/ML
INJECTION, EMULSION INTRAVENOUS CONTINUOUS PRN
Status: DISCONTINUED | OUTPATIENT
Start: 2023-10-12 | End: 2023-10-12 | Stop reason: SDUPTHER

## 2023-10-12 RX ORDER — MIDAZOLAM HYDROCHLORIDE 1 MG/ML
INJECTION INTRAMUSCULAR; INTRAVENOUS PRN
Status: DISCONTINUED | OUTPATIENT
Start: 2023-10-12 | End: 2023-10-12 | Stop reason: SDUPTHER

## 2023-10-12 RX ORDER — EPHEDRINE SULFATE 50 MG/ML
INJECTION INTRAVENOUS PRN
Status: DISCONTINUED | OUTPATIENT
Start: 2023-10-12 | End: 2023-10-12 | Stop reason: SDUPTHER

## 2023-10-12 RX ADMIN — COLLAGENASE SANTYL: 250 OINTMENT TOPICAL at 09:07

## 2023-10-12 RX ADMIN — AMPICILLIN SODIUM 2000 MG: 2 INJECTION, POWDER, FOR SOLUTION INTRAVENOUS at 14:29

## 2023-10-12 RX ADMIN — FENTANYL CITRATE 25 MCG: 50 INJECTION, SOLUTION INTRAMUSCULAR; INTRAVENOUS at 21:06

## 2023-10-12 RX ADMIN — EPHEDRINE SULFATE 10 MG: 50 INJECTION INTRAVENOUS at 21:24

## 2023-10-12 RX ADMIN — ATORVASTATIN CALCIUM 40 MG: 40 TABLET, FILM COATED ORAL at 09:06

## 2023-10-12 RX ADMIN — AMPICILLIN SODIUM 2000 MG: 2 INJECTION, POWDER, FOR SOLUTION INTRAVENOUS at 07:33

## 2023-10-12 RX ADMIN — FENTANYL CITRATE 25 MCG: 50 INJECTION, SOLUTION INTRAMUSCULAR; INTRAVENOUS at 20:51

## 2023-10-12 RX ADMIN — CARVEDILOL 6.25 MG: 6.25 TABLET, FILM COATED ORAL at 09:06

## 2023-10-12 RX ADMIN — FENTANYL CITRATE 25 MCG: 50 INJECTION, SOLUTION INTRAMUSCULAR; INTRAVENOUS at 20:56

## 2023-10-12 RX ADMIN — ASPIRIN 81 MG: 81 TABLET, COATED ORAL at 09:06

## 2023-10-12 RX ADMIN — SODIUM CHLORIDE, PRESERVATIVE FREE 10 ML: 5 INJECTION INTRAVENOUS at 23:13

## 2023-10-12 RX ADMIN — AMPICILLIN SODIUM 2000 MG: 2 INJECTION, POWDER, FOR SOLUTION INTRAVENOUS at 18:49

## 2023-10-12 RX ADMIN — Medication 120 MCG: at 21:27

## 2023-10-12 RX ADMIN — AMPICILLIN SODIUM 2000 MG: 2 INJECTION, POWDER, FOR SOLUTION INTRAVENOUS at 03:49

## 2023-10-12 RX ADMIN — SODIUM CHLORIDE, PRESERVATIVE FREE 10 ML: 5 INJECTION INTRAVENOUS at 22:18

## 2023-10-12 RX ADMIN — SODIUM CHLORIDE, PRESERVATIVE FREE 10 ML: 5 INJECTION INTRAVENOUS at 09:06

## 2023-10-12 RX ADMIN — APIXABAN 5 MG: 5 TABLET, FILM COATED ORAL at 09:06

## 2023-10-12 RX ADMIN — DEXMEDETOMIDINE HYDROCHLORIDE 10 MCG: 100 INJECTION, SOLUTION, CONCENTRATE INTRAVENOUS at 20:51

## 2023-10-12 RX ADMIN — WATER 2000 MG: 1 INJECTION INTRAMUSCULAR; INTRAVENOUS; SUBCUTANEOUS at 11:53

## 2023-10-12 RX ADMIN — TORSEMIDE 40 MG: 20 TABLET ORAL at 09:06

## 2023-10-12 RX ADMIN — MIDAZOLAM 1 MG: 1 INJECTION INTRAMUSCULAR; INTRAVENOUS at 20:51

## 2023-10-12 RX ADMIN — POTASSIUM CHLORIDE 40 MEQ: 750 TABLET, FILM COATED, EXTENDED RELEASE ORAL at 23:13

## 2023-10-12 RX ADMIN — PROPOFOL 50 MCG/KG/MIN: 10 INJECTION, EMULSION INTRAVENOUS at 20:56

## 2023-10-12 RX ADMIN — MIDAZOLAM 1 MG: 1 INJECTION INTRAMUSCULAR; INTRAVENOUS at 20:55

## 2023-10-12 RX ADMIN — AMPICILLIN SODIUM 2000 MG: 2 INJECTION, POWDER, FOR SOLUTION INTRAVENOUS at 11:14

## 2023-10-12 RX ADMIN — PANTOPRAZOLE SODIUM 40 MG: 40 TABLET, DELAYED RELEASE ORAL at 23:12

## 2023-10-12 RX ADMIN — POTASSIUM CHLORIDE 40 MEQ: 750 TABLET, FILM COATED, EXTENDED RELEASE ORAL at 14:30

## 2023-10-12 RX ADMIN — DEXMEDETOMIDINE HYDROCHLORIDE 10 MCG: 100 INJECTION, SOLUTION, CONCENTRATE INTRAVENOUS at 20:55

## 2023-10-12 RX ADMIN — SODIUM CHLORIDE: 9 INJECTION, SOLUTION INTRAVENOUS at 18:48

## 2023-10-12 RX ADMIN — POTASSIUM CHLORIDE 40 MEQ: 750 TABLET, FILM COATED, EXTENDED RELEASE ORAL at 09:06

## 2023-10-12 RX ADMIN — PANTOPRAZOLE SODIUM 40 MG: 40 TABLET, DELAYED RELEASE ORAL at 09:06

## 2023-10-12 RX ADMIN — FENTANYL CITRATE 25 MCG: 50 INJECTION, SOLUTION INTRAMUSCULAR; INTRAVENOUS at 21:01

## 2023-10-12 RX ADMIN — EPHEDRINE SULFATE 10 MG: 50 INJECTION INTRAVENOUS at 21:30

## 2023-10-12 RX ADMIN — AMPICILLIN SODIUM 2000 MG: 2 INJECTION, POWDER, FOR SOLUTION INTRAVENOUS at 23:14

## 2023-10-12 RX ADMIN — CARVEDILOL 6.25 MG: 6.25 TABLET, FILM COATED ORAL at 23:13

## 2023-10-12 RX ADMIN — Medication 120 MCG: at 21:31

## 2023-10-12 RX ADMIN — ONDANSETRON 4 MG: 2 INJECTION INTRAMUSCULAR; INTRAVENOUS at 21:04

## 2023-10-12 RX ADMIN — INSULIN GLARGINE 22 UNITS: 100 INJECTION, SOLUTION SUBCUTANEOUS at 23:15

## 2023-10-12 ASSESSMENT — PAIN SCALES - GENERAL
PAINLEVEL_OUTOF10: 0

## 2023-10-12 NOTE — ANESTHESIA PRE PROCEDURE
Department of Anesthesiology  Preprocedure Note       Name:  Fernando Powell   Age:  72 y.o.  :  1950                                          MRN:  [id number]         Date:  10/12/2023      Surgeon: Criselda Meier):  Markus Woodard DPM    Procedure: Procedure(s):  I & D GANGRENOUS TISSUE LEFT FOOT WITH APPLICATION ACELLULAR SKIN GRAFT (PURAPLY)- SURGEON TO CALL REP  REQ [id number]    Medications prior to admission:   Prior to Admission medications    Medication Sig Start Date End Date Taking?  Authorizing Provider   apixaban (ELIQUIS) 5 MG TABS tablet Take 1 tablet by mouth 2 times daily   Yes Provider, Historical, MD   Glucosamine-Chondroit-Vit C-Mn (GLUCOSAMINE 1500 COMPLEX PO) Take 1,500 mg by mouth daily   Yes Provider, MD Butch   pantoprazole (PROTONIX) 40 MG tablet Take 1 tablet by mouth in the morning and at bedtime   Yes Provider, Historical, MD   Cholecalciferol (VITAMIN D3) 50 MCG (2000 UT) CAPS Take 1 capsule by mouth daily   Yes Provider, MD Butch   spironolactone (ALDACTONE) 25 MG tablet Take 25 mg by mouth daily  Patient taking differently: Take 1 tablet by mouth daily Take 25 mg by mouth daily 23   Tristen Marc IV, MD   torsemide (DEMADEX) 20 MG tablet Take 1 tablet by mouth daily Take 20 mg by mouth daily 23   Tristen Marc IV, MD   allopurinol (ZYLOPRIM) 100 MG tablet Take 1 tablet by mouth every other day 21   Automatic Reconciliation, Ar   ascorbic acid (VITAMIN C) 1000 MG tablet Take 1 tablet by mouth 2 times daily    Automatic Reconciliation, Ar   aspirin 81 MG EC tablet Take 1 tablet by mouth daily    Automatic Reconciliation, Ar   atorvastatin (LIPITOR) 40 MG tablet Take 1 tablet by mouth daily 3/4/22   Automatic Reconciliation, Ar   carvedilol (COREG) 12.5 MG tablet Take 1 tablet by mouth 2 times daily 3/4/22   Automatic Reconciliation, Ar   Cholecalciferol 100 MCG (4000 UT) CAPS Take 4,000 Units by mouth daily    Automatic Reconciliation, Ar   cloNIDine

## 2023-10-13 LAB
ANION GAP SERPL CALC-SCNC: 8 MMOL/L (ref 5–15)
BUN SERPL-MCNC: 19 MG/DL (ref 6–20)
BUN/CREAT SERPL: 10 (ref 12–20)
CALCIUM SERPL-MCNC: 8.8 MG/DL (ref 8.5–10.1)
CHLORIDE SERPL-SCNC: 104 MMOL/L (ref 97–108)
CO2 SERPL-SCNC: 26 MMOL/L (ref 21–32)
CREAT SERPL-MCNC: 1.83 MG/DL (ref 0.7–1.3)
ERYTHROCYTE [DISTWIDTH] IN BLOOD BY AUTOMATED COUNT: 14 % (ref 11.5–14.5)
GLUCOSE BLD STRIP.AUTO-MCNC: 103 MG/DL (ref 65–117)
GLUCOSE BLD STRIP.AUTO-MCNC: 121 MG/DL (ref 65–117)
GLUCOSE BLD STRIP.AUTO-MCNC: 89 MG/DL (ref 65–117)
GLUCOSE BLD STRIP.AUTO-MCNC: 94 MG/DL (ref 65–117)
GLUCOSE SERPL-MCNC: 177 MG/DL (ref 65–100)
HCT VFR BLD AUTO: 41.8 % (ref 36.6–50.3)
HGB BLD-MCNC: 13.9 G/DL (ref 12.1–17)
MAGNESIUM SERPL-MCNC: 2.1 MG/DL (ref 1.6–2.4)
MCH RBC QN AUTO: 30.1 PG (ref 26–34)
MCHC RBC AUTO-ENTMCNC: 33.3 G/DL (ref 30–36.5)
MCV RBC AUTO: 90.5 FL (ref 80–99)
NRBC # BLD: 0 K/UL (ref 0–0.01)
NRBC BLD-RTO: 0 PER 100 WBC
PLATELET # BLD AUTO: 241 K/UL (ref 150–400)
PMV BLD AUTO: 9.7 FL (ref 8.9–12.9)
POTASSIUM SERPL-SCNC: 4.1 MMOL/L (ref 3.5–5.1)
RBC # BLD AUTO: 4.62 M/UL (ref 4.1–5.7)
SERVICE CMNT-IMP: ABNORMAL
SERVICE CMNT-IMP: NORMAL
SODIUM SERPL-SCNC: 138 MMOL/L (ref 136–145)
WBC # BLD AUTO: 9.6 K/UL (ref 4.1–11.1)

## 2023-10-13 PROCEDURE — 82962 GLUCOSE BLOOD TEST: CPT

## 2023-10-13 PROCEDURE — 99231 SBSQ HOSP IP/OBS SF/LOW 25: CPT

## 2023-10-13 PROCEDURE — 6370000000 HC RX 637 (ALT 250 FOR IP): Performed by: HOSPITALIST

## 2023-10-13 PROCEDURE — 80048 BASIC METABOLIC PNL TOTAL CA: CPT

## 2023-10-13 PROCEDURE — 85027 COMPLETE CBC AUTOMATED: CPT

## 2023-10-13 PROCEDURE — 6360000002 HC RX W HCPCS: Performed by: INTERNAL MEDICINE

## 2023-10-13 PROCEDURE — 6370000000 HC RX 637 (ALT 250 FOR IP): Performed by: STUDENT IN AN ORGANIZED HEALTH CARE EDUCATION/TRAINING PROGRAM

## 2023-10-13 PROCEDURE — 6370000000 HC RX 637 (ALT 250 FOR IP): Performed by: INTERNAL MEDICINE

## 2023-10-13 PROCEDURE — 6370000000 HC RX 637 (ALT 250 FOR IP)

## 2023-10-13 PROCEDURE — 36415 COLL VENOUS BLD VENIPUNCTURE: CPT

## 2023-10-13 PROCEDURE — 2580000003 HC RX 258: Performed by: STUDENT IN AN ORGANIZED HEALTH CARE EDUCATION/TRAINING PROGRAM

## 2023-10-13 PROCEDURE — 83735 ASSAY OF MAGNESIUM: CPT

## 2023-10-13 PROCEDURE — 2580000003 HC RX 258: Performed by: INTERNAL MEDICINE

## 2023-10-13 PROCEDURE — 2060000000 HC ICU INTERMEDIATE R&B

## 2023-10-13 RX ORDER — TORSEMIDE 20 MG/1
20 TABLET ORAL DAILY
Status: DISCONTINUED | OUTPATIENT
Start: 2023-10-14 | End: 2023-10-17 | Stop reason: HOSPADM

## 2023-10-13 RX ORDER — INSULIN GLARGINE 100 [IU]/ML
18 INJECTION, SOLUTION SUBCUTANEOUS NIGHTLY
Status: DISCONTINUED | OUTPATIENT
Start: 2023-10-13 | End: 2023-10-16

## 2023-10-13 RX ADMIN — TORSEMIDE 40 MG: 20 TABLET ORAL at 09:18

## 2023-10-13 RX ADMIN — ACETAMINOPHEN 650 MG: 325 TABLET ORAL at 21:58

## 2023-10-13 RX ADMIN — SODIUM CHLORIDE, PRESERVATIVE FREE 10 ML: 5 INJECTION INTRAVENOUS at 09:23

## 2023-10-13 RX ADMIN — Medication 5 UNITS: at 09:18

## 2023-10-13 RX ADMIN — AMPICILLIN SODIUM 2000 MG: 2 INJECTION, POWDER, FOR SOLUTION INTRAVENOUS at 07:26

## 2023-10-13 RX ADMIN — PANTOPRAZOLE SODIUM 40 MG: 40 TABLET, DELAYED RELEASE ORAL at 09:18

## 2023-10-13 RX ADMIN — SODIUM CHLORIDE, PRESERVATIVE FREE 10 ML: 5 INJECTION INTRAVENOUS at 20:44

## 2023-10-13 RX ADMIN — WATER 2000 MG: 1 INJECTION INTRAMUSCULAR; INTRAVENOUS; SUBCUTANEOUS at 12:02

## 2023-10-13 RX ADMIN — WATER 2000 MG: 1 INJECTION INTRAMUSCULAR; INTRAVENOUS; SUBCUTANEOUS at 00:54

## 2023-10-13 RX ADMIN — ALLOPURINOL 100 MG: 100 TABLET ORAL at 09:18

## 2023-10-13 RX ADMIN — POTASSIUM CHLORIDE 40 MEQ: 750 TABLET, FILM COATED, EXTENDED RELEASE ORAL at 09:18

## 2023-10-13 RX ADMIN — INSULIN GLARGINE 18 UNITS: 100 INJECTION, SOLUTION SUBCUTANEOUS at 20:43

## 2023-10-13 RX ADMIN — POTASSIUM CHLORIDE 40 MEQ: 750 TABLET, FILM COATED, EXTENDED RELEASE ORAL at 20:42

## 2023-10-13 RX ADMIN — AMPICILLIN SODIUM 2000 MG: 2 INJECTION, POWDER, FOR SOLUTION INTRAVENOUS at 20:42

## 2023-10-13 RX ADMIN — PANTOPRAZOLE SODIUM 40 MG: 40 TABLET, DELAYED RELEASE ORAL at 20:42

## 2023-10-13 RX ADMIN — POTASSIUM CHLORIDE 40 MEQ: 750 TABLET, FILM COATED, EXTENDED RELEASE ORAL at 13:28

## 2023-10-13 RX ADMIN — ATORVASTATIN CALCIUM 40 MG: 40 TABLET, FILM COATED ORAL at 09:18

## 2023-10-13 RX ADMIN — AMPICILLIN SODIUM 2000 MG: 2 INJECTION, POWDER, FOR SOLUTION INTRAVENOUS at 12:02

## 2023-10-13 RX ADMIN — AMPICILLIN SODIUM 2000 MG: 2 INJECTION, POWDER, FOR SOLUTION INTRAVENOUS at 03:14

## 2023-10-13 RX ADMIN — CARVEDILOL 6.25 MG: 6.25 TABLET, FILM COATED ORAL at 20:42

## 2023-10-13 RX ADMIN — AMPICILLIN SODIUM 2000 MG: 2 INJECTION, POWDER, FOR SOLUTION INTRAVENOUS at 15:46

## 2023-10-13 ASSESSMENT — PAIN - FUNCTIONAL ASSESSMENT: PAIN_FUNCTIONAL_ASSESSMENT: PREVENTS OR INTERFERES SOME ACTIVE ACTIVITIES AND ADLS

## 2023-10-13 ASSESSMENT — PAIN DESCRIPTION - ORIENTATION: ORIENTATION: LEFT;POSTERIOR

## 2023-10-13 ASSESSMENT — PAIN SCALES - GENERAL: PAINLEVEL_OUTOF10: 4

## 2023-10-13 ASSESSMENT — PAIN DESCRIPTION - LOCATION: LOCATION: FOOT

## 2023-10-13 ASSESSMENT — PAIN DESCRIPTION - ONSET: ONSET: ON-GOING

## 2023-10-13 ASSESSMENT — PAIN DESCRIPTION - DESCRIPTORS: DESCRIPTORS: THROBBING

## 2023-10-13 ASSESSMENT — PAIN DESCRIPTION - PAIN TYPE: TYPE: SURGICAL PAIN

## 2023-10-13 ASSESSMENT — PAIN DESCRIPTION - FREQUENCY: FREQUENCY: CONTINUOUS

## 2023-10-13 NOTE — OP NOTE
[address] [facility]  OPERATIVE REPORT    Name: Jacoby Tamayo  MR#:  [id number]  :  1950  ACCOUNT #:  [de-identified]  DATE OF SERVICE:  10/12/2023    PREOPERATIVE DIAGNOSES:  Gangrenous tissue, left heel and distal left forefoot. POSTOPERATIVE DIAGNOSES:  Gangrenous tissue, left heel and distal left forefoot. PROCEDURES PERFORMED:  1. Wound prep and removal of gangrenous tissue, left heel followed by the application of an acellular wound graft. 2.  Excisional debridement of gangrenous tissue, distal aspect, left foot. SURGEON:  Deejay Worley DPM    ASSISTANT: none    ANESTHESIA:  IV sedation with local.    COMPLICATIONS:  none    SPECIMENS REMOVED:  Wound cultures. IMPLANTS:  puraply acellular wound matrix    ESTIMATED BLOOD LOSS:  Minimal.    INDICATIONS:  This 81-year-old white male presents with dry gangrenous tissue, posterior left heel and plantar toes in the left forefoot. Earlier this week, the patient had what appeared to be denuded blisters, but there was no necrotic tissue, however, within the course of three days, necrotic and dry plaques of eschar developed. X-ray did not show any evidence of osteomyelitis. ABIs showed adequate and normal arterial perfusion of lower extremities. At this time, medical history and physical has been performed, and the patient released for surgery. Physical examination of the lower extremities revealed palpable pedal pulses, fairly good muscle strength with diminished epicritic sensation in the lower extremities bilateral.  In the posterior plantar aspect of the left heel, there is a necrotic dry eschar with surrounding erythema, mild wound measures 4 x 5 x 0.0 cm, scant drainage, mild warmth with surrounding erythema.   There are dry plaques of necrotic eschar, plantar first right MPJ that measures 3 x 2 x 0.0 cm, same type of tissues on plantar first left toe that measures 3 x 1.2 x 0.0 cm and plantar second left toe that measures

## 2023-10-13 NOTE — CARE COORDINATION
Transition of Care Plan:    RUR: 14%  Prior Level of Functioning:   Disposition: Home with Home Health SN, iv abx      DME needed:   Transportation at discharge:   IM/IMM Medicare/ letter given: 1st letter 9/29/23    Caregiver Contact: wife  Discharge Caregiver contacted prior to discharge? Yes met wife 10/13/23  Care Conference needed? no  Barriers to discharge:  medical stability  Per IDR, patient here over the weekend    [facility] approved patient or servicing. Patient will have an out-of-pocket cost at $14/day (drugs an supplies). CM met with patient/wife. He has been  known to [facility] (2020) so referral being sent  to [facility] and other agencies. Dai Cunha RN with [facility] ([phone]) here to visit patient and wife. CM continuing to follow. DAVID BenavidezW.  CRM

## 2023-10-13 NOTE — BRIEF OP NOTE
Brief Postoperative Note      Patient: Lisa Vallecillo  YOB: 1950  MRN: [id number]    Date of Procedure: 10/12/2023    Pre-Op Diagnosis Codes:     * Gangrene ([address]) Tomma Plenty left foot    Post-Op Diagnosis: Same       1)Excisional debridement of gangrenous tissue left heel with application of puraply acellular skin graft  2) excisional debridement of gangrenous tissue plantar left forefoot      Surgeon(s):  Levi Carreno DPM    Assistant:  * No surgical staff found *    Anesthesia: Monitor Anesthesia Care    Estimated Blood Loss (mL): Minimal    Complications: None    Specimens:   ID Type Source Tests Collected by Time Destination   A : swab from the heel bone for anaerobic and aerobic culture Swab Foot CULTURE, WOUND Levi Carreno DPM 10/12/2023 2134        Implants:  * No implants in log *      Drains: * No LDAs found *    Findings: necrotic tissue      Electronically signed by Alee Mora DPM on 10/12/2023 at 10:14 PM

## 2023-10-14 LAB
ERYTHROCYTE [DISTWIDTH] IN BLOOD BY AUTOMATED COUNT: 13.8 % (ref 11.5–14.5)
GLUCOSE BLD STRIP.AUTO-MCNC: 115 MG/DL (ref 65–117)
GLUCOSE BLD STRIP.AUTO-MCNC: 130 MG/DL (ref 65–117)
GLUCOSE BLD STRIP.AUTO-MCNC: 138 MG/DL (ref 65–117)
GLUCOSE BLD STRIP.AUTO-MCNC: 158 MG/DL (ref 65–117)
HCT VFR BLD AUTO: 39.4 % (ref 36.6–50.3)
HGB BLD-MCNC: 13.2 G/DL (ref 12.1–17)
MCH RBC QN AUTO: 30.1 PG (ref 26–34)
MCHC RBC AUTO-ENTMCNC: 33.5 G/DL (ref 30–36.5)
MCV RBC AUTO: 89.7 FL (ref 80–99)
NRBC # BLD: 0 K/UL (ref 0–0.01)
NRBC BLD-RTO: 0 PER 100 WBC
PLATELET # BLD AUTO: 239 K/UL (ref 150–400)
PMV BLD AUTO: 10.1 FL (ref 8.9–12.9)
RBC # BLD AUTO: 4.39 M/UL (ref 4.1–5.7)
SERVICE CMNT-IMP: ABNORMAL
SERVICE CMNT-IMP: NORMAL
WBC # BLD AUTO: 9.3 K/UL (ref 4.1–11.1)

## 2023-10-14 PROCEDURE — 6370000000 HC RX 637 (ALT 250 FOR IP): Performed by: HOSPITALIST

## 2023-10-14 PROCEDURE — 2580000003 HC RX 258: Performed by: STUDENT IN AN ORGANIZED HEALTH CARE EDUCATION/TRAINING PROGRAM

## 2023-10-14 PROCEDURE — 85027 COMPLETE CBC AUTOMATED: CPT

## 2023-10-14 PROCEDURE — 82962 GLUCOSE BLOOD TEST: CPT

## 2023-10-14 PROCEDURE — 2060000000 HC ICU INTERMEDIATE R&B

## 2023-10-14 PROCEDURE — 6370000000 HC RX 637 (ALT 250 FOR IP): Performed by: INTERNAL MEDICINE

## 2023-10-14 PROCEDURE — 36415 COLL VENOUS BLD VENIPUNCTURE: CPT

## 2023-10-14 PROCEDURE — 6370000000 HC RX 637 (ALT 250 FOR IP)

## 2023-10-14 PROCEDURE — 6370000000 HC RX 637 (ALT 250 FOR IP): Performed by: PODIATRIST

## 2023-10-14 PROCEDURE — 6370000000 HC RX 637 (ALT 250 FOR IP): Performed by: STUDENT IN AN ORGANIZED HEALTH CARE EDUCATION/TRAINING PROGRAM

## 2023-10-14 PROCEDURE — 94760 N-INVAS EAR/PLS OXIMETRY 1: CPT

## 2023-10-14 PROCEDURE — 2580000003 HC RX 258: Performed by: INTERNAL MEDICINE

## 2023-10-14 PROCEDURE — 6360000002 HC RX W HCPCS: Performed by: INTERNAL MEDICINE

## 2023-10-14 RX ORDER — LINEZOLID 600 MG/1
600 TABLET, FILM COATED ORAL EVERY 12 HOURS SCHEDULED
Status: DISCONTINUED | OUTPATIENT
Start: 2023-10-14 | End: 2023-10-16

## 2023-10-14 RX ADMIN — ATORVASTATIN CALCIUM 40 MG: 40 TABLET, FILM COATED ORAL at 10:24

## 2023-10-14 RX ADMIN — COLLAGENASE SANTYL: 250 OINTMENT TOPICAL at 10:24

## 2023-10-14 RX ADMIN — SODIUM CHLORIDE, PRESERVATIVE FREE 10 ML: 5 INJECTION INTRAVENOUS at 22:49

## 2023-10-14 RX ADMIN — CARVEDILOL 6.25 MG: 6.25 TABLET, FILM COATED ORAL at 22:48

## 2023-10-14 RX ADMIN — INSULIN GLARGINE 18 UNITS: 100 INJECTION, SOLUTION SUBCUTANEOUS at 22:48

## 2023-10-14 RX ADMIN — POTASSIUM CHLORIDE 40 MEQ: 750 TABLET, FILM COATED, EXTENDED RELEASE ORAL at 10:23

## 2023-10-14 RX ADMIN — WATER 2000 MG: 1 INJECTION INTRAMUSCULAR; INTRAVENOUS; SUBCUTANEOUS at 13:11

## 2023-10-14 RX ADMIN — PANTOPRAZOLE SODIUM 40 MG: 40 TABLET, DELAYED RELEASE ORAL at 10:24

## 2023-10-14 RX ADMIN — LINEZOLID 600 MG: 600 TABLET, FILM COATED ORAL at 22:59

## 2023-10-14 RX ADMIN — POTASSIUM CHLORIDE 40 MEQ: 750 TABLET, FILM COATED, EXTENDED RELEASE ORAL at 13:12

## 2023-10-14 RX ADMIN — PANTOPRAZOLE SODIUM 40 MG: 40 TABLET, DELAYED RELEASE ORAL at 22:48

## 2023-10-14 RX ADMIN — AMPICILLIN SODIUM 2000 MG: 2 INJECTION, POWDER, FOR SOLUTION INTRAVENOUS at 13:11

## 2023-10-14 RX ADMIN — AMPICILLIN SODIUM 2000 MG: 2 INJECTION, POWDER, FOR SOLUTION INTRAVENOUS at 22:49

## 2023-10-14 RX ADMIN — AMPICILLIN SODIUM 2000 MG: 2 INJECTION, POWDER, FOR SOLUTION INTRAVENOUS at 10:23

## 2023-10-14 RX ADMIN — AMPICILLIN SODIUM 2000 MG: 2 INJECTION, POWDER, FOR SOLUTION INTRAVENOUS at 00:40

## 2023-10-14 RX ADMIN — SODIUM CHLORIDE, PRESERVATIVE FREE 10 ML: 5 INJECTION INTRAVENOUS at 10:24

## 2023-10-14 RX ADMIN — POTASSIUM CHLORIDE 40 MEQ: 750 TABLET, FILM COATED, EXTENDED RELEASE ORAL at 22:48

## 2023-10-14 RX ADMIN — TORSEMIDE 20 MG: 20 TABLET ORAL at 10:24

## 2023-10-14 RX ADMIN — AMPICILLIN SODIUM 2000 MG: 2 INJECTION, POWDER, FOR SOLUTION INTRAVENOUS at 05:22

## 2023-10-14 RX ADMIN — WATER 2000 MG: 1 INJECTION INTRAMUSCULAR; INTRAVENOUS; SUBCUTANEOUS at 00:34

## 2023-10-14 RX ADMIN — CARVEDILOL 6.25 MG: 6.25 TABLET, FILM COATED ORAL at 10:23

## 2023-10-14 RX ADMIN — AMPICILLIN SODIUM 2000 MG: 2 INJECTION, POWDER, FOR SOLUTION INTRAVENOUS at 16:11

## 2023-10-14 ASSESSMENT — PAIN SCALES - GENERAL
PAINLEVEL_OUTOF10: 0
PAINLEVEL_OUTOF10: 0

## 2023-10-15 ENCOUNTER — APPOINTMENT (OUTPATIENT)
Facility: HOSPITAL | Age: 73
DRG: 853 | End: 2023-10-15
Payer: MEDICARE

## 2023-10-15 LAB
ANION GAP SERPL CALC-SCNC: 8 MMOL/L (ref 5–15)
BASOPHILS # BLD: 0.1 K/UL (ref 0–0.1)
BASOPHILS NFR BLD: 1 % (ref 0–1)
BUN SERPL-MCNC: 22 MG/DL (ref 6–20)
BUN/CREAT SERPL: 11 (ref 12–20)
CALCIUM SERPL-MCNC: 9.4 MG/DL (ref 8.5–10.1)
CHLORIDE SERPL-SCNC: 102 MMOL/L (ref 97–108)
CO2 SERPL-SCNC: 27 MMOL/L (ref 21–32)
CREAT SERPL-MCNC: 2.04 MG/DL (ref 0.7–1.3)
D DIMER PPP FEU-MCNC: 0.49 MG/L FEU (ref 0–0.65)
DIFFERENTIAL METHOD BLD: ABNORMAL
EKG ATRIAL RATE: 61 BPM
EKG DIAGNOSIS: NORMAL
EKG P AXIS: 107 DEGREES
EKG P-R INTERVAL: 206 MS
EKG Q-T INTERVAL: 468 MS
EKG QRS DURATION: 132 MS
EKG QTC CALCULATION (BAZETT): 471 MS
EKG R AXIS: -51 DEGREES
EKG T AXIS: 61 DEGREES
EKG VENTRICULAR RATE: 61 BPM
EOSINOPHIL # BLD: 0.2 K/UL (ref 0–0.4)
EOSINOPHIL NFR BLD: 3 % (ref 0–7)
ERYTHROCYTE [DISTWIDTH] IN BLOOD BY AUTOMATED COUNT: 13.6 % (ref 11.5–14.5)
GLUCOSE BLD STRIP.AUTO-MCNC: 113 MG/DL (ref 65–117)
GLUCOSE BLD STRIP.AUTO-MCNC: 132 MG/DL (ref 65–117)
GLUCOSE BLD STRIP.AUTO-MCNC: 136 MG/DL (ref 65–117)
GLUCOSE BLD STRIP.AUTO-MCNC: 150 MG/DL (ref 65–117)
GLUCOSE SERPL-MCNC: 177 MG/DL (ref 65–100)
HCT VFR BLD AUTO: 40.9 % (ref 36.6–50.3)
HGB BLD-MCNC: 13.6 G/DL (ref 12.1–17)
IMM GRANULOCYTES # BLD AUTO: 0.1 K/UL (ref 0–0.04)
IMM GRANULOCYTES NFR BLD AUTO: 1 % (ref 0–0.5)
LYMPHOCYTES # BLD: 1.5 K/UL (ref 0.8–3.5)
LYMPHOCYTES NFR BLD: 16 % (ref 12–49)
MCH RBC QN AUTO: 29.6 PG (ref 26–34)
MCHC RBC AUTO-ENTMCNC: 33.3 G/DL (ref 30–36.5)
MCV RBC AUTO: 89.1 FL (ref 80–99)
MONOCYTES # BLD: 1 K/UL (ref 0–1)
MONOCYTES NFR BLD: 10 % (ref 5–13)
NEUTS SEG # BLD: 6.8 K/UL (ref 1.8–8)
NEUTS SEG NFR BLD: 69 % (ref 32–75)
NRBC # BLD: 0 K/UL (ref 0–0.01)
NRBC BLD-RTO: 0 PER 100 WBC
PLATELET # BLD AUTO: 271 K/UL (ref 150–400)
PMV BLD AUTO: 10 FL (ref 8.9–12.9)
POTASSIUM SERPL-SCNC: 4.6 MMOL/L (ref 3.5–5.1)
RBC # BLD AUTO: 4.59 M/UL (ref 4.1–5.7)
SERVICE CMNT-IMP: ABNORMAL
SERVICE CMNT-IMP: NORMAL
SODIUM SERPL-SCNC: 137 MMOL/L (ref 136–145)
TROPONIN I SERPL HS-MCNC: 16 NG/L (ref 0–76)
WBC # BLD AUTO: 9.7 K/UL (ref 4.1–11.1)

## 2023-10-15 PROCEDURE — 84484 ASSAY OF TROPONIN QUANT: CPT

## 2023-10-15 PROCEDURE — 93010 ELECTROCARDIOGRAM REPORT: CPT | Performed by: INTERNAL MEDICINE

## 2023-10-15 PROCEDURE — 6370000000 HC RX 637 (ALT 250 FOR IP)

## 2023-10-15 PROCEDURE — 6370000000 HC RX 637 (ALT 250 FOR IP): Performed by: PODIATRIST

## 2023-10-15 PROCEDURE — 6370000000 HC RX 637 (ALT 250 FOR IP): Performed by: STUDENT IN AN ORGANIZED HEALTH CARE EDUCATION/TRAINING PROGRAM

## 2023-10-15 PROCEDURE — 82962 GLUCOSE BLOOD TEST: CPT

## 2023-10-15 PROCEDURE — 85025 COMPLETE CBC W/AUTO DIFF WBC: CPT

## 2023-10-15 PROCEDURE — 2060000000 HC ICU INTERMEDIATE R&B

## 2023-10-15 PROCEDURE — 6370000000 HC RX 637 (ALT 250 FOR IP): Performed by: INTERNAL MEDICINE

## 2023-10-15 PROCEDURE — 36415 COLL VENOUS BLD VENIPUNCTURE: CPT

## 2023-10-15 PROCEDURE — 93005 ELECTROCARDIOGRAM TRACING: CPT | Performed by: NURSE PRACTITIONER

## 2023-10-15 PROCEDURE — 2580000003 HC RX 258: Performed by: INTERNAL MEDICINE

## 2023-10-15 PROCEDURE — 6370000000 HC RX 637 (ALT 250 FOR IP): Performed by: HOSPITALIST

## 2023-10-15 PROCEDURE — 97530 THERAPEUTIC ACTIVITIES: CPT

## 2023-10-15 PROCEDURE — 97161 PT EVAL LOW COMPLEX 20 MIN: CPT

## 2023-10-15 PROCEDURE — 6360000002 HC RX W HCPCS: Performed by: INTERNAL MEDICINE

## 2023-10-15 PROCEDURE — 71045 X-RAY EXAM CHEST 1 VIEW: CPT

## 2023-10-15 PROCEDURE — 85379 FIBRIN DEGRADATION QUANT: CPT

## 2023-10-15 PROCEDURE — 80048 BASIC METABOLIC PNL TOTAL CA: CPT

## 2023-10-15 PROCEDURE — 2580000003 HC RX 258: Performed by: STUDENT IN AN ORGANIZED HEALTH CARE EDUCATION/TRAINING PROGRAM

## 2023-10-15 RX ADMIN — SODIUM CHLORIDE, PRESERVATIVE FREE 10 ML: 5 INJECTION INTRAVENOUS at 22:05

## 2023-10-15 RX ADMIN — COLLAGENASE SANTYL: 250 OINTMENT TOPICAL at 09:45

## 2023-10-15 RX ADMIN — SODIUM CHLORIDE, PRESERVATIVE FREE 10 ML: 5 INJECTION INTRAVENOUS at 09:45

## 2023-10-15 RX ADMIN — AMPICILLIN SODIUM 2000 MG: 2 INJECTION, POWDER, FOR SOLUTION INTRAVENOUS at 14:46

## 2023-10-15 RX ADMIN — AMPICILLIN SODIUM 2000 MG: 2 INJECTION, POWDER, FOR SOLUTION INTRAVENOUS at 06:52

## 2023-10-15 RX ADMIN — APIXABAN 5 MG: 5 TABLET, FILM COATED ORAL at 21:58

## 2023-10-15 RX ADMIN — AMPICILLIN SODIUM 2000 MG: 2 INJECTION, POWDER, FOR SOLUTION INTRAVENOUS at 09:50

## 2023-10-15 RX ADMIN — PANTOPRAZOLE SODIUM 40 MG: 40 TABLET, DELAYED RELEASE ORAL at 09:37

## 2023-10-15 RX ADMIN — AMPICILLIN SODIUM 2000 MG: 2 INJECTION, POWDER, FOR SOLUTION INTRAVENOUS at 02:38

## 2023-10-15 RX ADMIN — ALLOPURINOL 100 MG: 100 TABLET ORAL at 09:37

## 2023-10-15 RX ADMIN — ATORVASTATIN CALCIUM 40 MG: 40 TABLET, FILM COATED ORAL at 09:35

## 2023-10-15 RX ADMIN — CARVEDILOL 6.25 MG: 6.25 TABLET, FILM COATED ORAL at 21:57

## 2023-10-15 RX ADMIN — PANTOPRAZOLE SODIUM 40 MG: 40 TABLET, DELAYED RELEASE ORAL at 21:57

## 2023-10-15 RX ADMIN — WATER 2000 MG: 1 INJECTION INTRAMUSCULAR; INTRAVENOUS; SUBCUTANEOUS at 14:42

## 2023-10-15 RX ADMIN — POTASSIUM CHLORIDE 40 MEQ: 750 TABLET, FILM COATED, EXTENDED RELEASE ORAL at 09:35

## 2023-10-15 RX ADMIN — LINEZOLID 600 MG: 600 TABLET, FILM COATED ORAL at 09:35

## 2023-10-15 RX ADMIN — LINEZOLID 600 MG: 600 TABLET, FILM COATED ORAL at 22:17

## 2023-10-15 RX ADMIN — AMPICILLIN SODIUM 2000 MG: 2 INJECTION, POWDER, FOR SOLUTION INTRAVENOUS at 22:01

## 2023-10-15 RX ADMIN — WATER 2000 MG: 1 INJECTION INTRAMUSCULAR; INTRAVENOUS; SUBCUTANEOUS at 01:15

## 2023-10-15 RX ADMIN — INSULIN GLARGINE 18 UNITS: 100 INJECTION, SOLUTION SUBCUTANEOUS at 22:04

## 2023-10-15 RX ADMIN — TORSEMIDE 20 MG: 20 TABLET ORAL at 09:37

## 2023-10-15 ASSESSMENT — PAIN SCALES - GENERAL
PAINLEVEL_OUTOF10: 0

## 2023-10-15 NOTE — CONSULTS
[facility]  DIABETES MANAGEMENT CONSULT    Consulted by Provider for advanced nursing evaluation and care for inpatient blood glucose management. Evaluation and Action Plan   Nia Gonzáles is a 72 y.o. male with history of  CAD, CHF, CKD stage III, hypertension, type 2 diabetes, dyslipidemia, BPH, who was admitted with fever, leukocytosis, malaise, and intermittent SOB. He also has had 2 AVRs ; 1st one needed replacing due to valve endocarditis in 2020. When admitted, he had blisters/wounds to left foot with drainage. Podiatry and ID following. His diabetes is managed by Dr. Kate Paredes and his has been on basal/bolus insulin regimen, as well as Trulicity at home. Current A1c is 6.2%.     10/13 - Patient went for excisional debridement of left foot yesterday afternoon. Had large amount of bleeding from surgical site overnight, but stabilizing this afternoon.  this am and 89 at lunch time. He is not eating much and states he does not have much of an appetite. Dietician saw patient but patient declined ONS. He is still wearing his CGM and is able to keep track of BG trends. I encouraged him to eat, as he needs this for healing. Will decrease basal by 20% for tonight and hold mealtime boluses for now since poor po intake.     Management Rationale Action Plan   Medication   Basal needs Using 0.15 units/kg/D based on weight  Continue Lantus 18 units nightly    Over weekend:  If FBG < 140, then decrease basal by 20%  If FBG > 180, then increase basal by 20%   Nutritional needs  Humalog 5 units with meals (will hold for now since poor po intake)   Corrective insulin Using medium dose sensitivity based on weight and renal function        Diabetes Discharge Plan   Medication     Referral  []        Outpatient diabetes education   Additional orders:  Continue to monitor with CGM  Follow up with Dr. Lee Fatmata is a 72 y.o. male admitted on
[address] [facility]  DIABETES MANAGEMENT CONSULT    Consulted by Provider for advanced nursing evaluation and care for inpatient blood glucose management. Evaluation and Action Plan   Yevgeniy Garcia is a 72 y.o. male with history of  CAD, CHF, CKD stage III, hypertension, type 2 diabetes, dyslipidemia, BPH, who was admitted with fever, leukocytosis, malaise, and intermittent SOB. He also has had 2 AVRs ; 1st one needed replacing due to valve endocarditis in 2020. When admitted, he had blisters/wounds to left foot with drainage. Podiatry and ID following. His diabetes is managed by Dr. Dorothy Roque and his has been on basal/bolus insulin regimen, as well as Trulicity at home. Current A1c is 6.2%.     10/11  - patient's BG much better yesterday with addition of mealtime bolus insulin.  today. Will lower basal slightly to avoid hypoglycemia. Patient NPO today for debridement of right foot this afternoon. Will hold mealtime boluses for today     Management Rationale Action Plan   Medication   Basal needs Using 0.2 units/kg/D based on weight  Continue Lantus 22 units nightly   Nutritional needs  Humalog 5 units with meals   Corrective insulin Using medium dose sensitivity based on weight and renal function        Diabetes Discharge Plan   Medication     Referral  []        Outpatient diabetes education   Additional orders:  Continue to monitor with CGM  Follow up with Dr. Gloria Burgess is a 72 y.o. male admitted on 9/28/23 after experiencing SOB and fever. LAB:, A1c 6.2%, Na 133, K 3.0, creat 2.07, WBC 15.4  CXR: no acute process  CT chest:IMPRESSION:  No acute abnormality in the chest, abdomen, or pelvis.     HX:   Past Medical History:   Diagnosis Date    BPH (benign prostatic hypertrophy)     CAD (coronary artery disease)     CHF NYHA class III (symptoms with mildly strenuous activities) ([address]) 10/24/2014    Chronic kidney disease
[address] [facility]  DIABETES MANAGEMENT CONSULT    Consulted by Provider for advanced nursing evaluation and care for inpatient blood glucose management. Evaluation and Action Plan   Isrrael Dean is a 72 y.o. male with history of  CAD, CHF, CKD stage III, hypertension, type 2 diabetes, dyslipidemia, BPH, who was admitted with fever, leukocytosis, malaise, and intermittent SOB. He also has had 2 AVRs ; 1st one needed replacing due to valve endocarditis in 2020. When admitted, he had blisters/wounds to left foot with drainage. Podiatry and ID following. His diabetes is managed by Dr. Kym Cooper and his has been on basal/bolus insulin regimen, as well as Trulicity at home. Current A1c is 6.2%. He has had a complex hospital course with vegetation on prosthetic valve and continued problems with left foot wound, which patient and wife state has become more painful recently. His BG for first few days of admission were controlled without any insulin coverage. BG trends started to rise around 10/4. Started on basal insulin on 10/5. He is currently on basal of 0.2 unit/kg/D. FBG acceptable at 158 this am. He has had daytime BG in the 200s consistently. He is eating; enough to warrant a mealtime bolus in order to keep BG more stabilized. Target goal 140-180. Management Rationale Action Plan   Medication   Basal needs Using 0.2 units/kg/D based on weight  Continue Lantus 25 units nightly   Nutritional needs  Humalog 5 units with meals   Corrective insulin Using medium dose sensitivity based on weight and renal function        Diabetes Discharge Plan   Medication     Referral  []        Outpatient diabetes education   Additional orders:  Continue to monitor with CGM  Follow up with Dr. Dulce Chin is a 72 y.o. male admitted on 9/28/23 after experiencing SOB and fever.   LAB:, A1c 6.2%, Na 133, K 3.0, creat 2.07, WBC 15.4  CXR: no acute
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 Patient is on ceftriaxone and ampicillin IV until 11/16. Wound culture from foot 10/9+ for scant MRSE   Zyvox has been added. Wound culture 10/12 no organisms, NGTD. Will follow final cultures. Dr Patrick Rocha & team to follow from Monday.
Infectious Disease Consult Note    Reason for Consult: diabetic foot infection  Date of Consultation: September 29, 2023  Date of Admission: 9/28/2023  Referring Physician: Dr Nilson Matthews      HPI:  The patient was personally evaluated and examined by me at bedside today. The patient is a diabetic with frequently self treats his calluses and foot issues. The patient describes having worsening calluses on his heel with the entire of the skin of his heel falling off with exudate and bleeding. Wife is at bedside and tells me that he frequently does work on his feet and shows me some other areas of his feet where he has self treated calluses. She is a longtime diabetic who is supposedly has adequate control of his diabetes  Patient has a history of coronary artery disease as well as valvular disease with an aortic valve replacement. 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 The patient has no allergies to any antibiotics that he knows about other than allergy to Nafcillin. The patient has fever to 101.9 and states that he has had sweats and chills as well. 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       Past Medical History:  Past Medical History:   Diagnosis Date    BPH (benign prostatic hypertrophy)     CAD (coronary artery disease)     CHF NYHA class III (symptoms with mildly strenuous activities) ([address]) 10/24/2014    Chronic kidney disease     diabetic kidneys    CKD (chronic kidney disease), stage III ([address]) 4/2/2014    Coagulation disorder ([address])     PLAVIX    COVID-19 10/04/2021    DM (diabetes mellitus) ([address]) 7/11/2011    Endocarditis of aortic valve 10/6/2014    GERD (gastroesophageal reflux disease)     HTN (hypertension) 7/11/2011    Hyperlipidemia 7/11/2011    Hypovitaminosis D 4/16/2014    Kidney stone 7/11/2011         Surgical History:  Past Surgical History:   Procedure Laterality
activities) ([address]) 10/24/2014    Chronic kidney disease     diabetic kidneys    CKD (chronic kidney disease), stage III ([address]) 4/2/2014    Coagulation disorder ([address])     PLAVIX    COVID-19 10/04/2021    DM (diabetes mellitus) ([address]) 7/11/2011    Endocarditis of aortic valve 10/6/2014    GERD (gastroesophageal reflux disease)     HTN (hypertension) 7/11/2011    Hyperlipidemia 7/11/2011    Hypovitaminosis D 4/16/2014    Kidney stone 7/11/2011        INITIAL DX: Soft tissue infection of foot [L08.9]  Respiratory failure with hypoxia, unspecified chronicity ([address]) [J96.91]  Sepsis without acute organ dysfunction, due to unspecified organism ([address]) [A41.9]     Current Treatment     TX: antibiotics, allopurinol, statin, ASA, BP management, insulin, Protonix, pain management, Demedex, podiatry    Hospital Course   Clinical progress has been complicated by sepsis and endocarditis s/p AVR. Diabetes History   T2D for over 20 years. Mother had T2D.  Followed by Dr. Ifrah Armando    Diabetes-related Medical History  Acute complications  Hyperglycemia  Neurological complications  Peripheral neuropathy  Microvascular disease  Nephropathy  Macrovascular disease  CAD and foot wounds, CHF  Other associated conditions   HLD, HTN    Diabetes Medication History  Diabetes drug class Diabetes drug name Additional Comments   Biguanide  Metformin (not taking)    SGLT-2 inhibitors Jardiance (no longer taking)    GLP-1 Jeni Trulicity     Insulin Lantus 46 units daily  Humalog SS with meals; usually anywhere from 10-22 units      Diabetes self-management practices:   Eating pattern   [x] Eating a carbohydrate-controlled meal plan  [x] Breakfast  Eggs, protein bar, coffee  [x] Lunch   Sometimes doesn't eat depending on whether he eats a late breakfast  [x] Dinner   \"Depends on whether we go out to eat or not\"  [x] Beverages  Lots of water  [x] Dentition status No issue  Physical activity pattern   [x] Not currently employing a physical activity
tablet by mouth 2 times daily   Yes Historical Provider, MD   Glucosamine-Chondroit-Vit C-Mn (GLUCOSAMINE 1500 COMPLEX PO) Take 1,500 mg by mouth daily   Yes Historical Provider, MD   pantoprazole (PROTONIX) 40 MG tablet Take 1 tablet by mouth in the morning and at bedtime   Yes Historical Provider, MD   Cholecalciferol (VITAMIN D3) 50 MCG (2000 UT) CAPS Take 1 capsule by mouth daily   Yes Historical Provider, MD   spironolactone (ALDACTONE) 25 MG tablet Take 25 mg by mouth daily  Patient taking differently: Take 1 tablet by mouth daily Take 25 mg by mouth daily 5/12/23   W Stalin Farris IV, MD   torsemide (DEMADEX) 20 MG tablet Take 1 tablet by mouth daily Take 20 mg by mouth daily 5/12/23   W Stalin Farris IV, MD   allopurinol (ZYLOPRIM) 100 MG tablet Take 1 tablet by mouth every other day 1/11/21   Ar Automatic Reconciliation   ascorbic acid (VITAMIN C) 1000 MG tablet Take 1 tablet by mouth 2 times daily    Ar Automatic Reconciliation   aspirin 81 MG EC tablet Take 1 tablet by mouth daily    Ar Automatic Reconciliation   atorvastatin (LIPITOR) 40 MG tablet Take 1 tablet by mouth daily 3/4/22   Ar Automatic Reconciliation   carvedilol (COREG) 12.5 MG tablet Take 1 tablet by mouth 2 times daily 3/4/22   Ar Automatic Reconciliation   Cholecalciferol 100 MCG (4000 UT) CAPS Take 4,000 Units by mouth daily    Ar Automatic Reconciliation   cloNIDine (CATAPRES) 0.1 MG tablet Take 1 tablet by mouth once 3/4/22   Ar Automatic Reconciliation   empagliflozin (JARDIANCE) 25 MG tablet Take 25 mg by mouth daily  Patient not taking: Reported on 9/28/2023    Ar Automatic Reconciliation   insulin glargine (LANTUS) 100 UNIT/ML injection vial Inject 56 Units into the skin daily  Patient not taking: Reported on 9/28/2023    Ar Automatic Reconciliation   rivaroxaban (XARELTO) 15 MG TABS tablet Take 15 mg by mouth daily  Patient not taking: Reported on 9/28/2023    Ar Automatic Reconciliation   tamsulosin (FLOMAX) 0.4 MG capsule Take

## 2023-10-16 LAB
ALBUMIN SERPL-MCNC: 2.6 G/DL (ref 3.5–5)
ANION GAP SERPL CALC-SCNC: 11 MMOL/L (ref 5–15)
BUN SERPL-MCNC: 21 MG/DL (ref 6–20)
BUN/CREAT SERPL: 10 (ref 12–20)
CALCIUM SERPL-MCNC: 8.5 MG/DL (ref 8.5–10.1)
CHLORIDE SERPL-SCNC: 102 MMOL/L (ref 97–108)
CO2 SERPL-SCNC: 26 MMOL/L (ref 21–32)
CREAT SERPL-MCNC: 2.17 MG/DL (ref 0.7–1.3)
EKG ATRIAL RATE: 57 BPM
EKG DIAGNOSIS: NORMAL
EKG P AXIS: 59 DEGREES
EKG P-R INTERVAL: 200 MS
EKG Q-T INTERVAL: 498 MS
EKG QRS DURATION: 140 MS
EKG QTC CALCULATION (BAZETT): 484 MS
EKG R AXIS: -49 DEGREES
EKG T AXIS: 55 DEGREES
EKG VENTRICULAR RATE: 57 BPM
ERYTHROCYTE [DISTWIDTH] IN BLOOD BY AUTOMATED COUNT: 13.5 % (ref 11.5–14.5)
GLUCOSE BLD STRIP.AUTO-MCNC: 141 MG/DL (ref 65–117)
GLUCOSE BLD STRIP.AUTO-MCNC: 152 MG/DL (ref 65–117)
GLUCOSE BLD STRIP.AUTO-MCNC: 180 MG/DL (ref 65–117)
GLUCOSE BLD STRIP.AUTO-MCNC: 85 MG/DL (ref 65–117)
GLUCOSE SERPL-MCNC: 109 MG/DL (ref 65–100)
HCT VFR BLD AUTO: 38.2 % (ref 36.6–50.3)
HGB BLD-MCNC: 12.8 G/DL (ref 12.1–17)
MCH RBC QN AUTO: 30.3 PG (ref 26–34)
MCHC RBC AUTO-ENTMCNC: 33.5 G/DL (ref 30–36.5)
MCV RBC AUTO: 90.3 FL (ref 80–99)
NRBC # BLD: 0 K/UL (ref 0–0.01)
NRBC BLD-RTO: 0 PER 100 WBC
PHOSPHATE SERPL-MCNC: 3.2 MG/DL (ref 2.6–4.7)
PLATELET # BLD AUTO: 239 K/UL (ref 150–400)
PMV BLD AUTO: 10.2 FL (ref 8.9–12.9)
POTASSIUM SERPL-SCNC: 3.5 MMOL/L (ref 3.5–5.1)
RBC # BLD AUTO: 4.23 M/UL (ref 4.1–5.7)
SERVICE CMNT-IMP: ABNORMAL
SERVICE CMNT-IMP: NORMAL
SODIUM SERPL-SCNC: 139 MMOL/L (ref 136–145)
TROPONIN I SERPL HS-MCNC: 17 NG/L (ref 0–76)
WBC # BLD AUTO: 7.7 K/UL (ref 4.1–11.1)

## 2023-10-16 PROCEDURE — 6370000000 HC RX 637 (ALT 250 FOR IP): Performed by: INTERNAL MEDICINE

## 2023-10-16 PROCEDURE — 6370000000 HC RX 637 (ALT 250 FOR IP): Performed by: CLINICAL NURSE SPECIALIST

## 2023-10-16 PROCEDURE — 93005 ELECTROCARDIOGRAM TRACING: CPT | Performed by: NURSE PRACTITIONER

## 2023-10-16 PROCEDURE — 2580000003 HC RX 258: Performed by: INTERNAL MEDICINE

## 2023-10-16 PROCEDURE — 36415 COLL VENOUS BLD VENIPUNCTURE: CPT

## 2023-10-16 PROCEDURE — 6370000000 HC RX 637 (ALT 250 FOR IP): Performed by: STUDENT IN AN ORGANIZED HEALTH CARE EDUCATION/TRAINING PROGRAM

## 2023-10-16 PROCEDURE — 85027 COMPLETE CBC AUTOMATED: CPT

## 2023-10-16 PROCEDURE — 80069 RENAL FUNCTION PANEL: CPT

## 2023-10-16 PROCEDURE — 6360000002 HC RX W HCPCS: Performed by: INTERNAL MEDICINE

## 2023-10-16 PROCEDURE — 2580000003 HC RX 258: Performed by: STUDENT IN AN ORGANIZED HEALTH CARE EDUCATION/TRAINING PROGRAM

## 2023-10-16 PROCEDURE — 2060000000 HC ICU INTERMEDIATE R&B

## 2023-10-16 PROCEDURE — 94760 N-INVAS EAR/PLS OXIMETRY 1: CPT

## 2023-10-16 PROCEDURE — 84484 ASSAY OF TROPONIN QUANT: CPT

## 2023-10-16 PROCEDURE — 6370000000 HC RX 637 (ALT 250 FOR IP): Performed by: PODIATRIST

## 2023-10-16 PROCEDURE — 82962 GLUCOSE BLOOD TEST: CPT

## 2023-10-16 PROCEDURE — 99232 SBSQ HOSP IP/OBS MODERATE 35: CPT | Performed by: INTERNAL MEDICINE

## 2023-10-16 RX ORDER — INSULIN GLARGINE 100 [IU]/ML
14 INJECTION, SOLUTION SUBCUTANEOUS NIGHTLY
Status: DISCONTINUED | OUTPATIENT
Start: 2023-10-16 | End: 2023-10-17 | Stop reason: HOSPADM

## 2023-10-16 RX ADMIN — LINEZOLID 600 MG: 600 TABLET, FILM COATED ORAL at 08:43

## 2023-10-16 RX ADMIN — AMPICILLIN SODIUM 2000 MG: 2 INJECTION, POWDER, FOR SOLUTION INTRAVENOUS at 08:38

## 2023-10-16 RX ADMIN — INSULIN GLARGINE 14 UNITS: 100 INJECTION, SOLUTION SUBCUTANEOUS at 21:14

## 2023-10-16 RX ADMIN — ASPIRIN 81 MG: 81 TABLET, COATED ORAL at 08:42

## 2023-10-16 RX ADMIN — APIXABAN 5 MG: 5 TABLET, FILM COATED ORAL at 08:43

## 2023-10-16 RX ADMIN — CARVEDILOL 6.25 MG: 6.25 TABLET, FILM COATED ORAL at 08:43

## 2023-10-16 RX ADMIN — ATORVASTATIN CALCIUM 40 MG: 40 TABLET, FILM COATED ORAL at 08:43

## 2023-10-16 RX ADMIN — AMPICILLIN SODIUM 2000 MG: 2 INJECTION, POWDER, FOR SOLUTION INTRAVENOUS at 21:13

## 2023-10-16 RX ADMIN — WATER 2000 MG: 1 INJECTION INTRAMUSCULAR; INTRAVENOUS; SUBCUTANEOUS at 05:04

## 2023-10-16 RX ADMIN — APIXABAN 5 MG: 5 TABLET, FILM COATED ORAL at 21:14

## 2023-10-16 RX ADMIN — PANTOPRAZOLE SODIUM 40 MG: 40 TABLET, DELAYED RELEASE ORAL at 08:43

## 2023-10-16 RX ADMIN — TORSEMIDE 20 MG: 20 TABLET ORAL at 08:42

## 2023-10-16 RX ADMIN — SODIUM CHLORIDE, PRESERVATIVE FREE 10 ML: 5 INJECTION INTRAVENOUS at 21:16

## 2023-10-16 RX ADMIN — CARVEDILOL 6.25 MG: 6.25 TABLET, FILM COATED ORAL at 21:14

## 2023-10-16 RX ADMIN — AMPICILLIN SODIUM 2000 MG: 2 INJECTION, POWDER, FOR SOLUTION INTRAVENOUS at 05:05

## 2023-10-16 RX ADMIN — COLLAGENASE SANTYL: 250 OINTMENT TOPICAL at 08:37

## 2023-10-16 RX ADMIN — PANTOPRAZOLE SODIUM 40 MG: 40 TABLET, DELAYED RELEASE ORAL at 21:14

## 2023-10-16 RX ADMIN — AMPICILLIN SODIUM 2000 MG: 2 INJECTION, POWDER, FOR SOLUTION INTRAVENOUS at 15:34

## 2023-10-16 RX ADMIN — WATER 2000 MG: 1 INJECTION INTRAMUSCULAR; INTRAVENOUS; SUBCUTANEOUS at 17:25

## 2023-10-16 NOTE — CARE COORDINATION
Transition of Care Plan:    RUR: 14%    Prior Level of Functioning:   Disposition: Home with wife/ home with IV abx (2 abx) with end date of 11/16/23  Per IDR JESSE today 10/16/23    Home IV abx orders sent to [address] [place] [address]. Patient approved for servicing with OOP (out of pocket) cost for meds/supplies ($14/day). Accepting  agencies:  [facility], [facility] and [employer]   agencies. Transportation at discharge: TBD  IM/IMM Medicare/ letter given: 1st letter 9/29/23    Caregiver Contact: wife  Discharge Caregiver contacted prior to discharge? yes  Care Conference needed? Barriers to discharge:  medical stability. Referrals sent to [facility] (previous provider [phone]) and other [facility] agencies. Continuing to follow. Jaz Marie, [address] [place] [address]

## 2023-10-16 NOTE — WOUND CARE
WOCN Note:   Follow up visit  for bilateral feet wounds     Sagrario Pate is a 71 yo man with BPH, CAD s/p CABG x2, CKD Stage 3, paroxysmal atrial fibrillation on apixaban, h/o COVID-19 infection, T2DM, HTN, HLD, h/o aortic valve endocarditis s/p AV replacement was admitted on 2023 with SIRS, left foot blisters, R heel calluses, respiratory distress. Admitted on 23     Lab Results   Component Value Date/Time    WBC 7.7 10/16/2023 12:47 AM    LABA1C 6.2 (H) 10/02/2023 05:00 AM    POCGLU 85 10/16/2023 07:54 AM    POCGLU 113 10/15/2023 06:21 PM    HGB 12.8 10/16/2023 12:47 AM    HCT 38.2 10/16/2023 12:47 AM     10/16/2023 12:47 AM        Tobacco Use      Smoking status: Former        Packs/day: 4        Types: Cigarettes        Quit date: 2000        Years since quittin.8      Smokeless tobacco: Former     ADULT DIET; Regular; 5 carb choices (75 gm/meal)     Dietitian following  Will get Diabetes management consult     Assessment:   Alert and appropriately communicative. Denies pain. Wife at bedside. Mobile and Up ad gaby but has been in bed as he is non weight bearing on left foot. Patient declined assessment of sacrum. Continent. Surface: gel mattress  Right foot with intact callus on plantar heel. Declined offloading of this heel. Left heel offloaded and elevated with pillows    1. Evolving Wounds to Left Foot  Toes & Metatarsals:    S/p debridement 10/12/23  Areas depicted above on plantar great toe (~3 x 3 cm) and 1st metatarsal (~2 x 2 cm) with top layer of skin removed.  Now with areas of erythema and thin layer of tan tissue forming centrally on both      Intact maroon area to distal tip of 2nd toe depicted above      Heel:    ~ 8 x 8 x 0.1 cm   90% soft black and tan tissue, 10% pink/red with small serosanguinous exudate   Periwound without erythema    Tx: cleansed all above with saline, applied

## 2023-10-17 VITALS
HEART RATE: 59 BPM | SYSTOLIC BLOOD PRESSURE: 117 MMHG | WEIGHT: 262.35 LBS | OXYGEN SATURATION: 93 % | DIASTOLIC BLOOD PRESSURE: 68 MMHG | HEIGHT: 72 IN | RESPIRATION RATE: 16 BRPM | BODY MASS INDEX: 35.53 KG/M2 | TEMPERATURE: 97.7 F

## 2023-10-17 PROBLEM — J96.91 RESPIRATORY FAILURE WITH HYPOXIA, UNSPECIFIED CHRONICITY (HCC): Status: RESOLVED | Noted: 2023-09-28 | Resolved: 2023-10-17

## 2023-10-17 PROBLEM — A41.9 SEPSIS WITHOUT ACUTE ORGAN DYSFUNCTION (HCC): Status: RESOLVED | Noted: 2023-10-10 | Resolved: 2023-10-17

## 2023-10-17 LAB
ANION GAP SERPL CALC-SCNC: 8 MMOL/L (ref 5–15)
BACTERIA SPEC CULT: NORMAL
BUN SERPL-MCNC: 20 MG/DL (ref 6–20)
BUN/CREAT SERPL: 9 (ref 12–20)
CALCIUM SERPL-MCNC: 8.9 MG/DL (ref 8.5–10.1)
CHLORIDE SERPL-SCNC: 102 MMOL/L (ref 97–108)
CO2 SERPL-SCNC: 29 MMOL/L (ref 21–32)
CREAT SERPL-MCNC: 2.24 MG/DL (ref 0.7–1.3)
ERYTHROCYTE [DISTWIDTH] IN BLOOD BY AUTOMATED COUNT: 13.8 % (ref 11.5–14.5)
GLUCOSE BLD STRIP.AUTO-MCNC: 155 MG/DL (ref 65–117)
GLUCOSE BLD STRIP.AUTO-MCNC: 171 MG/DL (ref 65–117)
GLUCOSE BLD STRIP.AUTO-MCNC: 171 MG/DL (ref 65–117)
GLUCOSE SERPL-MCNC: 163 MG/DL (ref 65–100)
GRAM STN SPEC: NORMAL
GRAM STN SPEC: NORMAL
HCT VFR BLD AUTO: 41.4 % (ref 36.6–50.3)
HGB BLD-MCNC: 13.5 G/DL (ref 12.1–17)
MCH RBC QN AUTO: 29.6 PG (ref 26–34)
MCHC RBC AUTO-ENTMCNC: 32.6 G/DL (ref 30–36.5)
MCV RBC AUTO: 90.8 FL (ref 80–99)
NRBC # BLD: 0 K/UL (ref 0–0.01)
NRBC BLD-RTO: 0 PER 100 WBC
PLATELET # BLD AUTO: 276 K/UL (ref 150–400)
PMV BLD AUTO: 10.1 FL (ref 8.9–12.9)
POTASSIUM SERPL-SCNC: 4 MMOL/L (ref 3.5–5.1)
RBC # BLD AUTO: 4.56 M/UL (ref 4.1–5.7)
SERVICE CMNT-IMP: ABNORMAL
SERVICE CMNT-IMP: NORMAL
SODIUM SERPL-SCNC: 139 MMOL/L (ref 136–145)
WBC # BLD AUTO: 8.5 K/UL (ref 4.1–11.1)

## 2023-10-17 PROCEDURE — C1751 CATH, INF, PER/CENT/MIDLINE: HCPCS

## 2023-10-17 PROCEDURE — 2580000003 HC RX 258: Performed by: INTERNAL MEDICINE

## 2023-10-17 PROCEDURE — 6370000000 HC RX 637 (ALT 250 FOR IP): Performed by: STUDENT IN AN ORGANIZED HEALTH CARE EDUCATION/TRAINING PROGRAM

## 2023-10-17 PROCEDURE — 80048 BASIC METABOLIC PNL TOTAL CA: CPT

## 2023-10-17 PROCEDURE — 6370000000 HC RX 637 (ALT 250 FOR IP): Performed by: INTERNAL MEDICINE

## 2023-10-17 PROCEDURE — 6360000002 HC RX W HCPCS: Performed by: INTERNAL MEDICINE

## 2023-10-17 PROCEDURE — 36415 COLL VENOUS BLD VENIPUNCTURE: CPT

## 2023-10-17 PROCEDURE — 85027 COMPLETE CBC AUTOMATED: CPT

## 2023-10-17 PROCEDURE — 2580000003 HC RX 258: Performed by: STUDENT IN AN ORGANIZED HEALTH CARE EDUCATION/TRAINING PROGRAM

## 2023-10-17 PROCEDURE — 2709999900 HC NON-CHARGEABLE SUPPLY

## 2023-10-17 PROCEDURE — 82962 GLUCOSE BLOOD TEST: CPT

## 2023-10-17 RX ORDER — INSULIN GLARGINE 100 [IU]/ML
20 INJECTION, SOLUTION SUBCUTANEOUS DAILY
Qty: 10 ML | Refills: 3 | Status: CANCELLED | OUTPATIENT
Start: 2023-10-17

## 2023-10-17 RX ORDER — INSULIN GLARGINE 100 [IU]/ML
20 INJECTION, SOLUTION SUBCUTANEOUS DAILY
Qty: 10 ML | Refills: 3 | Status: SHIPPED | OUTPATIENT
Start: 2023-10-17

## 2023-10-17 RX ORDER — DULAGLUTIDE 1.5 MG/.5ML
1.5 INJECTION, SOLUTION SUBCUTANEOUS WEEKLY
COMMUNITY

## 2023-10-17 RX ORDER — INSULIN LISPRO 100 [IU]/ML
10-22 INJECTION, SOLUTION INTRAVENOUS; SUBCUTANEOUS
Status: ON HOLD | COMMUNITY
End: 2023-10-17 | Stop reason: CLARIF

## 2023-10-17 RX ORDER — INSULIN LISPRO 100 [IU]/ML
6-10 INJECTION, SOLUTION INTRAVENOUS; SUBCUTANEOUS 3 TIMES DAILY
COMMUNITY

## 2023-10-17 RX ORDER — CARVEDILOL 12.5 MG/1
6.25 TABLET ORAL 2 TIMES DAILY
Qty: 60 TABLET | Refills: 3 | Status: SHIPPED | OUTPATIENT
Start: 2023-10-17

## 2023-10-17 RX ADMIN — CARVEDILOL 6.25 MG: 6.25 TABLET, FILM COATED ORAL at 08:53

## 2023-10-17 RX ADMIN — TORSEMIDE 20 MG: 20 TABLET ORAL at 08:53

## 2023-10-17 RX ADMIN — PANTOPRAZOLE SODIUM 40 MG: 40 TABLET, DELAYED RELEASE ORAL at 08:53

## 2023-10-17 RX ADMIN — COLLAGENASE SANTYL: 250 OINTMENT TOPICAL at 15:34

## 2023-10-17 RX ADMIN — ATORVASTATIN CALCIUM 40 MG: 40 TABLET, FILM COATED ORAL at 08:53

## 2023-10-17 RX ADMIN — WATER 2000 MG: 1 INJECTION INTRAMUSCULAR; INTRAVENOUS; SUBCUTANEOUS at 15:05

## 2023-10-17 RX ADMIN — APIXABAN 5 MG: 5 TABLET, FILM COATED ORAL at 08:53

## 2023-10-17 RX ADMIN — ASPIRIN 81 MG: 81 TABLET, COATED ORAL at 08:53

## 2023-10-17 RX ADMIN — AMPICILLIN SODIUM 2000 MG: 2 INJECTION, POWDER, FOR SOLUTION INTRAVENOUS at 03:08

## 2023-10-17 RX ADMIN — ALLOPURINOL 100 MG: 100 TABLET ORAL at 08:53

## 2023-10-17 RX ADMIN — WATER 2000 MG: 1 INJECTION INTRAMUSCULAR; INTRAVENOUS; SUBCUTANEOUS at 06:40

## 2023-10-17 RX ADMIN — AMPICILLIN SODIUM 2000 MG: 2 INJECTION, POWDER, FOR SOLUTION INTRAVENOUS at 08:49

## 2023-10-17 RX ADMIN — SODIUM CHLORIDE, PRESERVATIVE FREE 10 ML: 5 INJECTION INTRAVENOUS at 08:50

## 2023-10-17 NOTE — CARE COORDINATION
Transition of Care Plan:    RUR: 14%  Prior Level of Functioning:     Disposition: Home with wife/ home with IV abx (2 abx) with end date of 11/16/23  Per IDR JESSE today 10/16/23    [facility]. Follow up appointments:     DME needed:   Transportation at discharge:   IM/[facility] Medicare/ letter given: 1st letter 9/29/23 2nd IM letter  10/17/23    Caregiver Contact: wife  Discharge Caregiver contacted prior to discharge? Care Conference needed? Barriers to discharge:   Picc Line placement. CM met with patient and spouse. [facility] CM sent update to [facility] agency (via [employer]) and called to advise that there were additional wound care orders placed by Dr. Flor Chavarria. CM faxed orders to agency at [phone]. CM continuing to follow. CM will notify [employer] with updates. 1:39pm  Message left on  to have Hilda Groves ([id number]  [facility] [phone]) return call. CM faxing PICC line report [facility].

## 2023-10-17 NOTE — DIABETES MGMT
nightly    Over weekend:  If FBG < 140, then decrease basal by 20%  If FBG > 180, then increase basal by 20%   Nutritional needs  Humalog 5 units with meals (will hold for now since poor po intake)   Corrective insulin Using medium dose sensitivity based on weight and renal function        Diabetes Discharge Plan   Medication  Patient will REDUCE Lantus dose to 20 units daily and adjust to keep fasting BG . A1C 6.2% at hospital admission. RESUME humalog (as needed) before meals (would start 6 -10 units TID) to keep pre meal BG   RESUME Trulicity and Jardiance as per PTA dosing   Additional orders:  Continue to monitor with CGM  Follow up with Dr. Urbano Martins-            Initial Presentation   Ferdinand Castleman is a 72 y.o. male admitted on 9/28/23 after experiencing SOB and fever. LAB:, A1c 6.2%, Na 133, K 3.0, creat 2.07, WBC 15.4  CXR: no acute process  CT chest:IMPRESSION:  No acute abnormality in the chest, abdomen, or pelvis.     HX:   Past Medical History:   Diagnosis Date    BPH (benign prostatic hypertrophy)     CAD (coronary artery disease)     CHF NYHA class III (symptoms with mildly strenuous activities) ([address]) 10/24/2014    Chronic kidney disease     diabetic kidneys    CKD (chronic kidney disease), stage III ([address]) 4/2/2014    Coagulation disorder ([address])     PLAVIX    COVID-19 10/04/2021    DM (diabetes mellitus) ([address]) 7/11/2011    Endocarditis of aortic valve 10/6/2014    GERD (gastroesophageal reflux disease)     HTN (hypertension) 7/11/2011    Hyperlipidemia 7/11/2011    Hypovitaminosis D 4/16/2014    Kidney stone 7/11/2011        INITIAL DX: Soft tissue infection of foot [L08.9]  Respiratory failure with hypoxia, unspecified chronicity ([address]) [J96.91]  Sepsis without acute organ dysfunction, due to unspecified organism ([address]) [A41.9]     Current Treatment     TX: antibiotics, allopurinol, statin, ASA, BP management, insulin, Protonix, pain management, Demedex, podiatry    Hospital Course

## 2023-10-17 NOTE — DISCHARGE INSTRUCTIONS
DIABETES DISCHARGE INSTRUCTIONS:    CONTINUE TAKING LANTUS AT A REDUCED DOSE, 20 UNITS DAILY TO KEEP FASTING BLOOD SUGAR    CONTINUE TAKING HUMALOG INSULIN AS NEEDED BEFORE A MEAL IF BG >180. WOULD REDUCE DOSE TO 6-10  UNITS TID AND ADJUST ACCORDING TO BLOOD SUGARS.     [facility] PRESCRIPTION DOSE

## 2023-10-17 NOTE — DISCHARGE SUMMARY
PATIENT CONDITION AT DISCHARGE:     Functional status    Poor    x Deconditioned     Independent      Cognition    x Lucid     Forgetful     Dementia      Catheters/lines (plus indication)    [place]     PICC     PEG    x None      Code status    x Full code     DNR      PHYSICAL EXAMINATION AT DISCHARGE:                                          General : awake, NAD  Chest: Clear to auscultation bilaterally   CVS: RRR, no m/r/g appreciated  Abd: soft, NT, ND  MSK: LLE wrapped, c/d/i  Neuro/Psych: pleasant mood and affect, responds appropriately to questions and commands  Skin: warm    CHRONIC MEDICAL DIAGNOSES:  Principal Problem (Resolved):    Respiratory failure with hypoxia, unspecified chronicity ([address])  Active Problems:    Open wound of left foot    Cellulitis of left foot    PAD (peripheral artery disease) ([address])  Resolved Problems:    Sepsis without acute organ dysfunction ([address])        Greater than 31 minutes were spent with the patient on counseling and coordination of care    Signed:   Betsey Small MD  10/17/2023  3:18 PM

## 2023-10-17 NOTE — PLAN OF CARE
0730 Bedside shift change report given to [address] (oncoming nurse) by Nguyễn Mccloud RN(offgoing nurse).  Report included the following information Nurse Handoff Report, Index, Intake/Output, MAR, Recent Results, and Cardiac Rhythm SB/NSR .     1750 Report given to [facility] for pt going to the OR     1810 Pt off floor to OR                    Problem: Discharge Planning  Goal: Discharge to home or other facility with appropriate resources  10/12/2023 1048 by Alvarez Savage RN  Outcome: Progressing  10/11/2023 2243 by Kelly Ballesteros RN  Outcome: Progressing  Flowsheets  Taken 10/11/2023 2243 by Kelly Ballesteros RN  Discharge to home or other facility with appropriate resources:   Identify barriers to discharge with patient and caregiver   Arrange for needed discharge resources and transportation as appropriate   Identify discharge learning needs (meds, wound care, etc)   Refer to discharge planning if patient needs post-hospital services based on physician order or complex needs related to functional status, cognitive ability or social support system  Taken 10/11/2023 0900 by Timothy Patiño RN  Discharge to home or other facility with appropriate resources: Identify barriers to discharge with patient and caregiver     Problem: Safety - Adult  Goal: Free from fall injury  10/12/2023 1048 by Alvarez Savage RN  Outcome: Progressing  10/11/2023 2243 by Kelly Ballesteros RN  Outcome: Progressing  Flowsheets (Taken 10/11/2023 2243)  Free From Fall Injury: Instruct family/caregiver on patient safety     Problem: ABCDS Injury Assessment  Goal: Absence of physical injury  10/12/2023 1048 by Alvarez Savage RN  Outcome: Progressing  10/11/2023 2243 by Kelly Ballesteros RN  Outcome: Progressing  Flowsheets (Taken 10/11/2023 2243)  Absence of Physical Injury: Implement safety measures based on patient assessment     Problem: Chronic Conditions and Co-morbidities  Goal: Patient's chronic conditions and
1825 - changed pt dressing. Noted new black eschar covering 90% of wound bed that was not present yesterday with wound care change. Notified MD and asked to assess. MD to BS, pulses palpable. Per MD eschar from healing, no concern. 1930 - Bedside and Verbal shift change report given to Jose E Ariza (oncoming nurse) by [address], [address] (offgoing nurse). Report included the following information Nurse Handoff Report, ED SBAR, Intake/Output, MAR, Recent Results, and Cardiac Rhythm NSR .          Problem: Discharge Planning  Goal: Discharge to home or other facility with appropriate resources  10/7/2023 1557 by Reba Tapia RN  Outcome: Progressing  10/7/2023 0239 by Juan Balderrama RN  Outcome: Progressing  [address] (Taken 10/7/2023 0239)  Discharge to home or other facility with appropriate resources:   Identify barriers to discharge with patient and caregiver   Arrange for needed discharge resources and transportation as appropriate   Identify discharge learning needs (meds, wound care, etc)     Problem: Safety - Adult  Goal: Free from fall injury  10/7/2023 1557 by Reba Tapia RN  Outcome: Progressing  10/7/2023 0239 by Juan Balderrama RN  Outcome: Progressing  [address] (Taken 10/7/2023 0239)  Free From Fall Injury: Instruct family/caregiver on patient safety     Problem: ABCDS Injury Assessment  Goal: Absence of physical injury  10/7/2023 1557 by Reba Tapia RN  Outcome: Progressing  10/7/2023 0239 by Juan Balderrama RN  Outcome: Progressing  [address] (Taken 10/5/2023 2000 by Jamie Burnette RN)  Absence of Physical Injury: Implement safety measures based on patient assessment     Problem: Chronic Conditions and Co-morbidities  Goal: Patient's chronic conditions and co-morbidity symptoms are monitored and maintained or improved  Outcome: Progressing     Problem: Pain  Goal: Verbalizes/displays adequate comfort level or baseline comfort level  Outcome: Progressing
1930 - Bedside and Verbal shift change report given to [address] (oncoming nurse) by Genevieve Diaz (offgoing nurse). Report included the following information Nurse Handoff Report, ED SBAR, Intake/Output, MAR, Recent Results, and Cardiac Rhythm NSR .      Problem: Discharge Planning  Goal: Discharge to home or other facility with appropriate resources  Outcome: Progressing     Problem: Safety - Adult  Goal: Free from fall injury  Outcome: Progressing     Problem: ABCDS Injury Assessment  Goal: Absence of physical injury  Outcome: Progressing     Problem: Chronic Conditions and Co-morbidities  Goal: Patient's chronic conditions and co-morbidity symptoms are monitored and maintained or improved  Outcome: Progressing     Problem: Pain  Goal: Verbalizes/displays adequate comfort level or baseline comfort level  Outcome: Progressing
3439S - Respiratory therapist came in with the cpap machine to be set up for the patient, but I told her that I should check for the order first since the patient is not using it for a long time. RT left. I asked the patient about the CPAP machine and said he doesn't want it since he is not using it for couple of years. Report given to AM nurse.             Problem: Discharge Planning  Goal: Discharge to home or other facility with appropriate resources  10/7/2023 0239 by Paz Stewart, RN  Outcome: Progressing  Flowsheets (Taken 10/7/2023 0239)  Discharge to home or other facility with appropriate resources:   Identify barriers to discharge with patient and caregiver   Arrange for needed discharge resources and transportation as appropriate   Identify discharge learning needs (meds, wound care, etc)     Problem: Safety - Adult  Goal: Free from fall injury  10/7/2023 0239 by Paz Stewart, RN  Outcome: Progressing  Flowsheets (Taken 10/7/2023 0239)  Free From Fall Injury: Instruct family/caregiver on patient safety       Problem: ABCDS Injury Assessment  Goal: Absence of physical injury  10/7/2023 0239 by Paz Stewart, RN  Outcome: Progressing
Bedside and Verbal shift change report given to [address] (oncoming nurse) by Wilfred Cuello (offgoing nurse). Report included the following information Nurse Handoff Report, ED SBAR, Intake/Output, MAR, Recent Results, and Cardiac Rhythm NSR .      Problem: Discharge Planning  Goal: Discharge to home or other facility with appropriate resources  Outcome: Progressing     Problem: Safety - Adult  Goal: Free from fall injury  Outcome: Progressing     Problem: ABCDS Injury Assessment  Goal: Absence of physical injury  Outcome: Progressing     Problem: Chronic Conditions and Co-morbidities  Goal: Patient's chronic conditions and co-morbidity symptoms are monitored and maintained or improved  Outcome: Progressing     Problem: Pain  Goal: Verbalizes/displays adequate comfort level or baseline comfort level  Outcome: Progressing
INFECTIOUS DISEASE discharge plan:        PICC line insertion for outpatient antibiotic        PICC line removal after last dose  11/16/23    2. PICC care per infusion protocol    3. Ceftriaxone 2 Gm Q 24 H with last dose 11/16/23 ( IVP  or IV )        Ampicillin 12 Gm Q 24 HR IV with last dose 11/16/23    4. Weekly labs CBC w/diff, Chem 7 with results sent to NP        ESR,CRP every 2 weeks with results sent to NP    5.    Follow up with  Issa Álvarez NP [phone] phone                                                                [phone] fax      For ID Questions Only    [place]   Call  [phone]
Problem: Discharge Planning  Goal: Discharge to home or other facility with appropriate resources  10/10/2023 0302 by Mercedes Yang RN  Outcome: Progressing  Flowsheets (Taken 10/10/2023 0302)  Discharge to home or other facility with appropriate resources:   Identify barriers to discharge with patient and caregiver   Arrange for needed discharge resources and transportation as appropriate   Identify discharge learning needs (meds, wound care, etc)   Refer to discharge planning if patient needs post-hospital services based on physician order or complex needs related to functional status, cognitive ability or social support system     Problem: Safety - Adult  Goal: Free from fall injury  10/10/2023 0302 by Mercedes Yang RN  Outcome: Progressing  Flowsheets (Taken 10/10/2023 0302)  Free From Fall Injury: Instruct family/caregiver on patient safety     Problem: ABCDS Injury Assessment  Goal: Absence of physical injury  10/10/2023 0302 by Mercedes Yang RN  Outcome: Progressing  Flowsheets (Taken 10/10/2023 0302)  Absence of Physical Injury: Implement safety measures based on patient assessment     Problem: Chronic Conditions and Co-morbidities  Goal: Patient's chronic conditions and co-morbidity symptoms are monitored and maintained or improved  10/10/2023 0302 by Mercedes Yang RN  Outcome: Progressing  Flowsheets (Taken 10/10/2023 0302)  Care Plan - Patient's Chronic Conditions and Co-Morbidity Symptoms are Monitored and Maintained or Improved:   Monitor and assess patient's chronic conditions and comorbid symptoms for stability, deterioration, or improvement   Update acute care plan with appropriate goals if chronic or comorbid symptoms are exacerbated and prevent overall improvement and discharge     Problem: Pain  Goal: Verbalizes/displays adequate comfort level or baseline comfort level  10/10/2023 0302 by Mercedes Yang RN  Outcome: Progressing  Flowsheets (Taken 10/10/2023
Problem: Discharge Planning  Goal: Discharge to home or other facility with appropriate resources  10/2/2023 1129 by Carter Pacheco RN  Outcome: Progressing  Flowsheets (Taken 10/2/2023 0800)  Discharge to home or other facility with appropriate resources:   Identify barriers to discharge with patient and caregiver   Arrange for needed discharge resources and transportation as appropriate  10/2/2023 0111 by Juan Guerra RN  Outcome: Progressing  Flowsheets (Taken 10/2/2023 0111)  Discharge to home or other facility with appropriate resources:   Identify barriers to discharge with patient and caregiver   Arrange for needed discharge resources and transportation as appropriate   Identify discharge learning needs (meds, wound care, etc)     Problem: Safety - Adult  Goal: Free from fall injury  10/2/2023 1129 by Carter Pacheco RN  Outcome: Progressing  10/2/2023 0111 by Juan Guerra RN  Outcome: Progressing  Flowsheets (Taken 10/2/2023 0111)  Free From Fall Injury: Instruct family/caregiver on patient safety     Problem: ABCDS Injury Assessment  Goal: Absence of physical injury  10/2/2023 1129 by Carter Pacheco RN  Outcome: Progressing  10/2/2023 0111 by Juan Guerra RN  Outcome: Progressing     Problem: Chronic Conditions and Co-morbidities  Goal: Patient's chronic conditions and co-morbidity symptoms are monitored and maintained or improved  Outcome: Progressing  Flowsheets (Taken 10/2/2023 0800)  Care Plan - Patient's Chronic Conditions and Co-Morbidity Symptoms are Monitored and Maintained or Improved: Monitor and assess patient's chronic conditions and comorbid symptoms for stability, deterioration, or improvement     Problem: Pain  Goal: Verbalizes/displays adequate comfort level or baseline comfort level  Outcome: Progressing  Flowsheets (Taken 10/2/2023 0800)  Verbalizes/displays adequate comfort level or baseline comfort level:   Encourage patient to monitor pain and
Problem: Discharge Planning  Goal: Discharge to home or other facility with appropriate resources  10/6/2023 1542 by Bismark Jeff RN  Outcome: Progressing  Flowsheets (Taken 10/6/2023 1123 by Reynoso Dry)  Discharge to home or other facility with appropriate resources:   Identify barriers to discharge with patient and caregiver   Identify discharge learning needs (meds, wound care, etc)  10/6/2023 0342 by Kristen Murphy RN  Outcome: Progressing  10/6/2023 0342 by Kristen Murphy RN  Outcome: [address]asc[address]juan manuel [place] (Taken 10/5/2023 2000)  Discharge to home or other facility with appropriate resources: Identify barriers to discharge with patient and caregiver     Problem: Safety - Adult  Goal: Free from fall injury  10/6/2023 1542 by Bismark Jeff RN  Outcome: Progressing  10/6/2023 0342 by Kristen Murphy RN  Outcome: Progressing  10/6/2023 0342 by Kristen Murphy RN  Outcome: Progressing  Flowsheets (Taken 10/5/2023 2000)  Free From Fall Injury: Instruct family/caregiver on patient safety     Problem: ABCDS Injury Assessment  Goal: Absence of physical injury  10/6/2023 1542 by Bismark Jeff RN  Outcome: Progressing  10/6/2023 0342 by Kristen Murphy RN  Outcome: Progressing  10/6/2023 0342 by Kristen Murphy RN  Outcome: Progressing  Flowsheets (Taken 10/5/2023 2000)  Absence of Physical Injury: Implement safety measures based on patient assessment     Problem: Chronic Conditions and Co-morbidities  Goal: Patient's chronic conditions and co-morbidity symptoms are monitored and maintained or improved  10/6/2023 1542 by Bismark Jeff RN  Outcome: Progressing  Flowsheets (Taken 10/6/2023 1123 by Edgardo Hung)  Care Plan - Patient's Chronic Conditions and Co-Morbidity Symptoms are Monitored and Maintained or Improved: Monitor and assess patient's chronic conditions and comorbid symptoms for stability, deterioration, or improvement  10/6/2023 0342 by Kristen Murphy RN  Outcome: Progressing  10/6/2023 0342 by Maulik Sandhu
Problem: Discharge Planning  Goal: Discharge to home or other facility with appropriate resources  10/9/2023 0304 by Leah Read RN  Outcome: Progressing  10/8/2023 2138 by Aaron Bennett RN  Outcome: Progressing  Flowsheets (Taken 10/8/2023 2000 by Leah Read RN)  Discharge to home or other facility with appropriate resources: Identify barriers to discharge with patient and caregiver     Problem: Safety - Adult  Goal: Free from fall injury  10/9/2023 0304 by Leah Read RN  Outcome: Progressing  10/8/2023 2138 by Aaron Bennett RN  Outcome: Progressing  Flowsheets (Taken 10/8/2023 2000 by Leah Read RN)  Free From Fall Injury: Instruct family/caregiver on patient safety     Problem: ABCDS Injury Assessment  Goal: Absence of physical injury  10/9/2023 0304 by Leah Read RN  Outcome: Progressing  10/8/2023 2138 by Aaron Bennett RN  Outcome: Progressing  Flowsheets (Taken 10/8/2023 2000 by Leah Read RN)  Absence of Physical Injury: Implement safety measures based on patient assessment     Problem: Chronic Conditions and Co-morbidities  Goal: Patient's chronic conditions and co-morbidity symptoms are monitored and maintained or improved  10/9/2023 0304 by Leah Read RN  Outcome: Progressing  10/8/2023 2138 by Aaron Bennett RN  Outcome: Progressing  Flowsheets (Taken 10/8/2023 2000 by Leah Read RN)  Care Plan - Patient's Chronic Conditions and Co-Morbidity Symptoms are Monitored and Maintained or Improved: Monitor and assess patient's chronic conditions and comorbid symptoms for stability, deterioration, or improvement     Problem: Pain  Goal: Verbalizes/displays adequate comfort level or baseline comfort level  10/9/2023 0304 by Leah Read RN  Outcome: Progressing  10/8/2023 2138 by Aaron Bennett RN  Outcome: Progressing
Problem: Discharge Planning  Goal: Discharge to home or other facility with appropriate resources  Outcome: Progressing     Problem: Safety - Adult  Goal: Free from fall injury  Outcome: Progressing     Problem: ABCDS Injury Assessment  Goal: Absence of physical injury  Outcome: Progressing     Problem: Chronic Conditions and Co-morbidities  Goal: Patient's chronic conditions and co-morbidity symptoms are monitored and maintained or improved  Outcome: Progressing     Problem: Pain  Goal: Verbalizes/displays adequate comfort level or baseline comfort level  Outcome: Progressing
Problem: Discharge Planning  Goal: Discharge to home or other facility with appropriate resources  Outcome: Progressing  Flowsheets (Taken 10/13/2023 0323)  Discharge to home or other facility with appropriate resources:   Identify barriers to discharge with patient and caregiver   Arrange for needed discharge resources and transportation as appropriate   Identify discharge learning needs (meds, wound care, etc)     Problem: Safety - Adult  Goal: Free from fall injury  Outcome: Progressing  Flowsheets (Taken 10/13/2023 0323)  Free From Fall Injury: Instruct family/caregiver on patient safety     Problem: ABCDS Injury Assessment  Goal: Absence of physical injury  Outcome: Progressing     Problem: Pain  Goal: Verbalizes/displays adequate comfort level or baseline comfort level  Outcome: Progressing  Flowsheets  Taken 10/13/2023 0323 by Tyler Hebert RN  Verbalizes/displays adequate comfort level or baseline comfort level:   Encourage patient to monitor pain and request assistance   Assess pain using appropriate pain scale   Administer analgesics based on type and severity of pain and evaluate response   Implement non-pharmacological measures as appropriate and evaluate response  Taken 10/12/2023 1536 by Elan Medina RN  Verbalizes/displays adequate comfort level or baseline comfort level:   Encourage patient to monitor pain and request assistance   Assess pain using appropriate pain scale   Administer analgesics based on type and severity of pain and evaluate response
Problem: Discharge Planning  Goal: Discharge to home or other facility with appropriate resources  Outcome: Progressing  Flowsheets (Taken 10/16/2023 2000 by Kortney Uribe RN)  Discharge to home or other facility with appropriate resources:   Identify barriers to discharge with patient and caregiver   Arrange for needed discharge resources and transportation as appropriate   Identify discharge learning needs (meds, wound care, etc)   Refer to discharge planning if patient needs post-hospital services based on physician order or complex needs related to functional status, cognitive ability or social support system     Problem: Safety - Adult  Goal: Free from fall injury  Outcome: Progressing     Problem: ABCDS Injury Assessment  Goal: Absence of physical injury  Outcome: Progressing     Problem: Chronic Conditions and Co-morbidities  Goal: Patient's chronic conditions and co-morbidity symptoms are monitored and maintained or improved  Outcome: Progressing  Flowsheets (Taken 10/16/2023 2000 by Kortney Uribe RN)  Care Plan - Patient's Chronic Conditions and Co-Morbidity Symptoms are Monitored and Maintained or Improved:   Monitor and assess patient's chronic conditions and comorbid symptoms for stability, deterioration, or improvement   Collaborate with multidisciplinary team to address chronic and comorbid conditions and prevent exacerbation or deterioration   Update acute care plan with appropriate goals if chronic or comorbid symptoms are exacerbated and prevent overall improvement and discharge     Problem: Pain  Goal: Verbalizes/displays adequate comfort level or baseline comfort level  Outcome: Progressing  Flowsheets (Taken 10/16/2023 2110 by Kortney Uribe RN)  Verbalizes/displays adequate comfort level or baseline comfort level:   Encourage patient to monitor pain and request assistance   Assess pain using appropriate pain scale   Administer analgesics based on type and severity of pain and evaluate
Problem: Physical Therapy - Adult  Goal: By Discharge: Performs mobility at highest level of function for planned discharge setting. See evaluation for individualized goals. Description: FUNCTIONAL STATUS PRIOR TO ADMISSION: Patient was independent and active without use of DME.    HOME SUPPORT PRIOR TO ADMISSION: The patient lived with wife but did not require assistance. No bed or bath downstairs, so must be able to go upstairs to second floor (14-15 steps with rail). 4 steps with rail to enter. Physical Therapy Goals  Initiated 10/15/2023  1. Patient will move from supine to sit and sit to supine, scoot up and down, and roll side to side in bed with independence within 7 day(s). 2.  Patient will perform sit to stand with modified independence within 7 day(s). 3.  Patient will transfer from bed to chair and chair to bed with modified independence using the least restrictive device within 7 day(s). 4.  Patient will ambulate with modified independence for 15 feet with the least restrictive device within 7 day(s). 5.  Patient will ascend/descend 4 stairs with to be determined-possibly bumping up on his buttocks, with minimal assistance within 7 day(s).    Outcome: Progressing   PHYSICAL THERAPY EVALUATION    Patient: Dedra Mitchell (86 y.o. male)  Date: 10/15/2023  Primary Diagnosis: Soft tissue infection of foot [L08.9]  Respiratory failure with hypoxia, unspecified chronicity ([address]) [J96.91]  Sepsis without acute organ dysfunction, due to unspecified organism ([address]) [A41.9]  Procedure(s) (LRB):  I & D GANGRENOUS TISSUE LEFT FOOT WITH APPLICATION ACELLULAR SKIN GRAFT (PURAPLY)- SURGEON TO CALL REP  REQ [id number] (Left) 3 Days Post-Op   Precautions: Fall Risk, Weight Bearing   Left Lower Extremity Weight Bearing: Non Weight Bearing                ASSESSMEN  DEFICITS/IMPAIRMENTS:   The patient is limited by decreased functional mobility, independence in ADLs, high-level IADLs, ROM, strength, endurance,
Problem: Safety - Adult  Goal: Free from fall injury  Outcome: Progressing  Flowsheets (Taken 10/2/2023 0111)  Free From Fall Injury: Instruct family/caregiver on patient safety     Problem: ABCDS Injury Assessment  Goal: Absence of physical injury  Outcome: Progressing     Problem: Discharge Planning  Goal: Discharge to home or other facility with appropriate resources  Outcome: Progressing  Flowsheets (Taken 10/2/2023 0111)  Discharge to home or other facility with appropriate resources:   Identify barriers to discharge with patient and caregiver   Arrange for needed discharge resources and transportation as appropriate   Identify discharge learning needs (meds, wound care, etc)
Report given to AM nurse. No complaint made all night. AM labs taken and results followed up. Potassium level within normal value.      Problem: Discharge Planning  Goal: Discharge to home or other facility with appropriate resources  10/10/2023 2308 by Shaw Stewart, RN  Outcome: Progressing  [address] (Taken 10/10/2023 2308)  Discharge to home or other facility with appropriate resources:   Identify barriers to discharge with patient and caregiver   Arrange for needed discharge resources and transportation as appropriate   Identify discharge learning needs (meds, wound care, etc)     Problem: Safety - Adult  Goal: Free from fall injury  10/10/2023 2308 by Shaw Stewart, RN  Outcome: Progressing     Problem: ABCDS Injury Assessment  Goal: Absence of physical injury  10/10/2023 2308 by Shaw Stewart, RN  Outcome: Progressing
Progressing
evaluate response   Notify Licensed Independent Practitioner if interventions unsuccessful or patient reports new pain
prevent overall improvement and discharge     Problem: Pain  Goal: Verbalizes/displays adequate comfort level or baseline comfort level  10/10/2023 1558 by Barry Austin RN  Outcome: Progressing  10/10/2023 0302 by Dannielle Sun RN  Outcome: Progressing  Flowsheets (Taken 10/10/2023 0302)  Verbalizes/displays adequate comfort level or baseline comfort level:   Encourage patient to monitor pain and request assistance   Assess pain using appropriate pain scale   Administer analgesics based on type and severity of pain and evaluate response   Implement non-pharmacological measures as appropriate and evaluate response   Notify Licensed Independent Practitioner if interventions unsuccessful or patient reports new pain

## 2023-10-27 ENCOUNTER — HOSPITAL ENCOUNTER (OUTPATIENT)
Facility: HOSPITAL | Age: 73
Discharge: HOME | End: 2023-10-27
Attending: PODIATRIST
Payer: COMMERCIAL

## 2023-10-27 VITALS
HEART RATE: 63 BPM | TEMPERATURE: 97.8 F | DIASTOLIC BLOOD PRESSURE: 58 MMHG | SYSTOLIC BLOOD PRESSURE: 129 MMHG | RESPIRATION RATE: 18 BRPM

## 2023-10-27 DIAGNOSIS — S91.302S OPEN WOUND OF LEFT FOOT, SEQUELA: ICD-10-CM

## 2023-10-27 DIAGNOSIS — S91.301S OPEN WOUND OF RIGHT HEEL, SEQUELA: Primary | ICD-10-CM

## 2023-10-27 DIAGNOSIS — I96 GANGRENE OF BOTH FEET (HCC): ICD-10-CM

## 2023-10-27 PROBLEM — S91.301A OPEN WOUND OF RIGHT HEEL: Status: ACTIVE | Noted: 2023-10-27

## 2023-10-27 PROCEDURE — 99213 OFFICE O/P EST LOW 20 MIN: CPT

## 2023-10-27 RX ORDER — IBUPROFEN 200 MG
TABLET ORAL ONCE
OUTPATIENT
Start: 2023-10-27 | End: 2023-10-27

## 2023-10-27 RX ORDER — BETAMETHASONE DIPROPIONATE 0.05 %
OINTMENT (GRAM) TOPICAL ONCE
Status: CANCELLED | OUTPATIENT
Start: 2023-10-27 | End: 2023-10-27

## 2023-10-27 RX ORDER — SODIUM CHLOR/HYPOCHLOROUS ACID 0.033 %
SOLUTION, IRRIGATION IRRIGATION ONCE
OUTPATIENT
Start: 2023-10-27 | End: 2023-10-27

## 2023-10-27 RX ORDER — CLOBETASOL PROPIONATE 0.5 MG/G
OINTMENT TOPICAL ONCE
OUTPATIENT
Start: 2023-10-27 | End: 2023-10-27

## 2023-10-27 RX ORDER — BACITRACIN ZINC AND POLYMYXIN B SULFATE 500; 1000 [USP'U]/G; [USP'U]/G
OINTMENT TOPICAL ONCE
OUTPATIENT
Start: 2023-10-27 | End: 2023-10-27

## 2023-10-27 RX ORDER — BETAMETHASONE DIPROPIONATE 0.05 %
OINTMENT (GRAM) TOPICAL ONCE
OUTPATIENT
Start: 2023-10-27 | End: 2023-10-27

## 2023-10-27 RX ORDER — GENTAMICIN SULFATE 1 MG/G
OINTMENT TOPICAL ONCE
Status: CANCELLED | OUTPATIENT
Start: 2023-10-27 | End: 2023-10-27

## 2023-10-27 RX ORDER — GENTAMICIN SULFATE 1 MG/G
OINTMENT TOPICAL ONCE
OUTPATIENT
Start: 2023-10-27 | End: 2023-10-27

## 2023-10-27 RX ORDER — LIDOCAINE 40 MG/G
CREAM TOPICAL ONCE
Status: CANCELLED | OUTPATIENT
Start: 2023-10-27 | End: 2023-10-27

## 2023-10-27 RX ORDER — TRIAMCINOLONE ACETONIDE 1 MG/G
OINTMENT TOPICAL ONCE
OUTPATIENT
Start: 2023-10-27 | End: 2023-10-27

## 2023-10-27 RX ORDER — LIDOCAINE HYDROCHLORIDE 20 MG/ML
JELLY TOPICAL ONCE
Status: CANCELLED | OUTPATIENT
Start: 2023-10-27 | End: 2023-10-27

## 2023-10-27 RX ORDER — LIDOCAINE HYDROCHLORIDE 20 MG/ML
JELLY TOPICAL ONCE
OUTPATIENT
Start: 2023-10-27 | End: 2023-10-27

## 2023-10-27 RX ORDER — LIDOCAINE 40 MG/G
CREAM TOPICAL ONCE
OUTPATIENT
Start: 2023-10-27 | End: 2023-10-27

## 2023-10-27 RX ORDER — IBUPROFEN 200 MG
TABLET ORAL ONCE
Status: CANCELLED | OUTPATIENT
Start: 2023-10-27 | End: 2023-10-27

## 2023-10-27 RX ORDER — LIDOCAINE 50 MG/G
OINTMENT TOPICAL ONCE
OUTPATIENT
Start: 2023-10-27 | End: 2023-10-27

## 2023-10-27 RX ORDER — TRIAMCINOLONE ACETONIDE 1 MG/G
OINTMENT TOPICAL ONCE
Status: CANCELLED | OUTPATIENT
Start: 2023-10-27 | End: 2023-10-27

## 2023-10-27 RX ORDER — GINSENG 100 MG
CAPSULE ORAL ONCE
Status: CANCELLED | OUTPATIENT
Start: 2023-10-27 | End: 2023-10-27

## 2023-10-27 RX ORDER — GINSENG 100 MG
CAPSULE ORAL ONCE
OUTPATIENT
Start: 2023-10-27 | End: 2023-10-27

## 2023-10-27 RX ORDER — LIDOCAINE HYDROCHLORIDE 40 MG/ML
SOLUTION TOPICAL ONCE
Status: CANCELLED | OUTPATIENT
Start: 2023-10-27 | End: 2023-10-27

## 2023-10-27 RX ORDER — LIDOCAINE 50 MG/G
OINTMENT TOPICAL ONCE
Status: CANCELLED | OUTPATIENT
Start: 2023-10-27 | End: 2023-10-27

## 2023-10-27 RX ORDER — SODIUM CHLOR/HYPOCHLOROUS ACID 0.033 %
SOLUTION, IRRIGATION IRRIGATION ONCE
Status: CANCELLED | OUTPATIENT
Start: 2023-10-27 | End: 2023-10-27

## 2023-10-27 RX ORDER — LIDOCAINE HYDROCHLORIDE 40 MG/ML
SOLUTION TOPICAL ONCE
OUTPATIENT
Start: 2023-10-27 | End: 2023-10-27

## 2023-10-27 RX ORDER — BACITRACIN ZINC AND POLYMYXIN B SULFATE 500; 1000 [USP'U]/G; [USP'U]/G
OINTMENT TOPICAL ONCE
Status: CANCELLED | OUTPATIENT
Start: 2023-10-27 | End: 2023-10-27

## 2023-10-27 RX ORDER — CLOBETASOL PROPIONATE 0.5 MG/G
OINTMENT TOPICAL ONCE
Status: CANCELLED | OUTPATIENT
Start: 2023-10-27 | End: 2023-10-27

## 2023-10-27 ASSESSMENT — PAIN DESCRIPTION - LOCATION: LOCATION: FOOT

## 2023-10-27 ASSESSMENT — PAIN SCALES - GENERAL: PAINLEVEL_OUTOF10: 1

## 2023-10-27 ASSESSMENT — PAIN DESCRIPTION - ORIENTATION: ORIENTATION: LEFT

## 2023-10-27 NOTE — PROGRESS NOTES
Melody-wound Assessment Intact 10/15/23 0800   Margins Attached edges 10/15/23 0800   Number of days: 28       Wound 09/29/23 Heel Left;Plantar (Active)   Wound Etiology Surgical 10/15/23 0800   Dressing Status Clean;Dry; Intact 10/15/23 0800   Wound Cleansed Not Cleansed 10/15/23 0800   Dressing/Treatment Ace wrap;Gauze dressing/dressing sponge; Other (comment) 10/15/23 0800   Offloading for Diabetic Foot Ulcers Offloading ordered 10/15/23 0800   Dressing Change Due 10/15/23 10/15/23 0800   Wound Assessment Bleeding 10/15/23 0800   Drainage Amount Small (< 25%) 10/15/23 0800   Drainage Description Sanguinous 10/15/23 0800   Odor None 10/15/23 0800   Melody-wound Assessment Intact 10/15/23 0800   Margins Attached edges 10/15/23 0800   Number of days: 27       Wound 10/27/23 Toe (Comment  which one) Dorsal;Other (Comment) #1 left great toe dorsal. (Active)   Wound Image   10/27/23 1414   Wound Etiology Diabetic 10/27/23 1414   Dressing Status Old drainage noted 10/27/23 1414   Wound Cleansed Cleansed with saline 10/27/23 1414   Wound Length (cm) 0.1 cm 10/27/23 1414   Wound Width (cm) 1.8 cm 10/27/23 1414   Wound Depth (cm) 0.1 cm 10/27/23 1414   Wound Surface Area (cm^2) 0.18 cm^2 10/27/23 1414   Wound Volume (cm^3) 0.018 cm^3 10/27/23 1414   Wound Assessment Pink/red 10/27/23 1414   Drainage Amount Scant (moist but unmeasurable) 10/27/23 1414   Drainage Description Serous 10/27/23 1414   Odor None 10/27/23 1414   Melody-wound Assessment Blanchable erythema 10/27/23 1414   Margins Defined edges 10/27/23 1414   Wound Thickness Description not for Pressure Injury Partial thickness 10/27/23 1414   Number of days: 0       Wound 10/27/23 Foot Dorsal #2 left foot ball of foot.  (Active)   Wound Image   10/27/23 1414   Wound Etiology Diabetic 10/27/23 1414   Wound Length (cm) 1.2 cm 10/27/23 1414   Wound Width (cm) 0.9 cm 10/27/23 1414   Wound Depth (cm) 0.1 cm 10/27/23 1414   Wound Surface Area (cm^2) 1.08 cm^2 10/27/23 1414

## 2023-10-27 NOTE — DISCHARGE INSTRUCTIONS
Discharge Instructions/Wound Care Orders  [address] [place] [address], [address]  Telephone: [phone]     Wound Care Orders:  Right Heel, Right plantar foot (@1st MPJ), Right Plantar Foot Great Toe Wounds - Cleanse wounds with Normal Saline & gauze or mild soap & water. Apply Santyl ointment to Wounds, Cover w/Normal Saline moist gauze, dry gauze and/or ABD pad & roll gauze. Home Health to use Honey Gel,Gauze and/or ABD pad & roll gauze until Santyl ointment is available. Dressings to be changed every other day & as needed for compromise of dressing. Follow up in 2 weeks. Treatment Orders:   Elevate leg(s) above the level of the heart when sitting, if swelling present. Avoid prolonged standing in one place. Wear off-loading shoe/boot on the affected foot when walking. Use heel offloading boot when in bed. Do not get dressing/cast wet. Do not use objects to scratch inside cast/wrap. Follow diet as prescribed:  [] Diet as tolerated: [x] Diabetic Diet: Low carb no sugar [] No Added Salt:  [] Increase Protein: [] Other:Limit the amount of liquid you are drinking and avoid drinking in between meals             Follow-up:  [x] Return Appointment: With Dr. Smith Left in [address] [place] [address])      Electronically signed Andrea Patel RN on 10/27/2023 at 3:03 PM     [address]: Should you experience any significant changes in your wound(s) or have questions about your wound care, please contact the [address] [place] [address] at [address] [employer] 8:00 am - 4:30. If you need help with your wound outside these hours and cannot wait until we are again available, contact your PCP or go to the hospital emergency room. PLEASE NOTE: IF YOU ARE UNABLE TO OBTAIN WOUND SUPPLIES, CONTINUE TO USE THE SUPPLIES YOU HAVE AVAILABLE UNTIL YOU ARE ABLE TO REACH US.  IT IS MOST IMPORTANT TO KEEP THE WOUND COVERED AT ALL

## 2023-11-29 ENCOUNTER — ANESTHESIA EVENT (OUTPATIENT)
Facility: HOSPITAL | Age: 73
End: 2023-11-29
Payer: MEDICARE

## 2023-11-29 ENCOUNTER — ANESTHESIA (OUTPATIENT)
Facility: HOSPITAL | Age: 73
End: 2023-11-29
Payer: MEDICARE

## 2023-11-29 ENCOUNTER — HOSPITAL ENCOUNTER (OUTPATIENT)
Facility: HOSPITAL | Age: 73
Discharge: HOME | End: 2023-12-01
Attending: INTERNAL MEDICINE
Payer: MEDICARE

## 2023-11-29 VITALS
HEIGHT: 72 IN | BODY MASS INDEX: 33.32 KG/M2 | TEMPERATURE: 98.1 F | SYSTOLIC BLOOD PRESSURE: 137 MMHG | DIASTOLIC BLOOD PRESSURE: 73 MMHG | OXYGEN SATURATION: 99 % | RESPIRATION RATE: 12 BRPM | HEART RATE: 46 BPM | WEIGHT: 246 LBS

## 2023-11-29 DIAGNOSIS — T82.6XXD INFECTION AND INFLAMMATORY REACTION DUE TO CARDIAC VALVE PROSTHESIS, SUBSEQUENT ENCOUNTER: ICD-10-CM

## 2023-11-29 LAB
ECHO AO ASC DIAM: 3.1 CM
ECHO AO ASCENDING AORTA INDEX: 1.33 CM/M2
ECHO AV AREA PEAK VELOCITY: 2.3 CM2
ECHO AV AREA VTI: 2.6 CM2
ECHO AV AREA/BSA PEAK VELOCITY: 1 CM2/M2
ECHO AV AREA/BSA VTI: 1.1 CM2/M2
ECHO AV MEAN GRADIENT: 8 MMHG
ECHO AV MEAN VELOCITY: 1.3 M/S
ECHO AV PEAK GRADIENT: 18 MMHG
ECHO AV PEAK VELOCITY: 2.1 M/S
ECHO AV VELOCITY RATIO: 0.57
ECHO AV VTI: 37.9 CM
ECHO BSA: 2.38 M2
ECHO EST RA PRESSURE: 5 MMHG
ECHO LVOT AREA: 4.2 CM2
ECHO LVOT AV VTI INDEX: 0.64
ECHO LVOT DIAM: 2.3 CM
ECHO LVOT MEAN GRADIENT: 3 MMHG
ECHO LVOT PEAK GRADIENT: 6 MMHG
ECHO LVOT PEAK VELOCITY: 1.2 M/S
ECHO LVOT STROKE VOLUME INDEX: 43.1 ML/M2
ECHO LVOT SV: 100.5 ML
ECHO LVOT VTI: 24.2 CM
ECHO RIGHT VENTRICULAR SYSTOLIC PRESSURE (RVSP): 24 MMHG
ECHO TV REGURGITANT MAX VELOCITY: 2.17 M/S
ECHO TV REGURGITANT PEAK GRADIENT: 19 MMHG

## 2023-11-29 PROCEDURE — 6360000002 HC RX W HCPCS: Performed by: NURSE ANESTHETIST, CERTIFIED REGISTERED

## 2023-11-29 PROCEDURE — 2580000003 HC RX 258: Performed by: NURSE ANESTHETIST, CERTIFIED REGISTERED

## 2023-11-29 PROCEDURE — 3700000001 HC ADD 15 MINUTES (ANESTHESIA)

## 2023-11-29 PROCEDURE — 3700000000 HC ANESTHESIA ATTENDED CARE

## 2023-11-29 PROCEDURE — 2500000003 HC RX 250 WO HCPCS: Performed by: NURSE ANESTHETIST, CERTIFIED REGISTERED

## 2023-11-29 PROCEDURE — 93320 DOPPLER ECHO COMPLETE: CPT

## 2023-11-29 RX ORDER — LIDOCAINE HYDROCHLORIDE 20 MG/ML
INJECTION, SOLUTION EPIDURAL; INFILTRATION; INTRACAUDAL; PERINEURAL PRN
Status: DISCONTINUED | OUTPATIENT
Start: 2023-11-29 | End: 2023-11-29 | Stop reason: SDUPTHER

## 2023-11-29 RX ORDER — 0.9 % SODIUM CHLORIDE 0.9 %
INTRAVENOUS SOLUTION INTRAVENOUS PRN
Status: DISCONTINUED | OUTPATIENT
Start: 2023-11-29 | End: 2023-11-29 | Stop reason: SDUPTHER

## 2023-11-29 RX ORDER — LIDOCAINE HYDROCHLORIDE 20 MG/ML
INJECTION, SOLUTION EPIDURAL; INFILTRATION; INTRACAUDAL; PERINEURAL
Status: COMPLETED
Start: 2023-11-29 | End: 2023-11-29

## 2023-11-29 RX ADMIN — PROPOFOL 50 MCG/KG/MIN: 10 INJECTION, EMULSION INTRAVENOUS at 14:53

## 2023-11-29 RX ADMIN — PROPOFOL 5 MG: 10 INJECTION, EMULSION INTRAVENOUS at 14:57

## 2023-11-29 RX ADMIN — PROPOFOL 40 MG: 10 INJECTION, EMULSION INTRAVENOUS at 14:54

## 2023-11-29 RX ADMIN — PROPOFOL 25 MG: 10 INJECTION, EMULSION INTRAVENOUS at 14:59

## 2023-11-29 RX ADMIN — LIDOCAINE HYDROCHLORIDE 40 MG: 20 INJECTION, SOLUTION EPIDURAL; INFILTRATION; INTRACAUDAL; PERINEURAL at 14:53

## 2023-11-29 RX ADMIN — SODIUM CHLORIDE 300 ML: 9 INJECTION, SOLUTION INTRAVENOUS at 15:21

## 2023-11-29 NOTE — PROGRESS NOTES
Pt arrives via W/C to noninvasive cardiology department accompanied by wife for CAROLIN procedure. All assessments completed and consent was reviewed. Education given was regarding procedure, sedation medications to be given, potential side effects of medications to be given, post-procedure management and follow-up. Opportunity for questions was provided and all questions and concerns were addressed. Patient and patient contact verbalized understanding of education.

## 2023-11-29 NOTE — PROGRESS NOTES
Patient has returned to baseline at arrival for procedure. Pt awake, alert, and oriented. VSS. Pt denies chest pain, SOB, and dizziness. I discussed AVS and discharge instructions with patient and with pt's wife and I provided a copy for him to take home. Pt and wife voiced understanding and denied any questions or need for clarification. IV D/C'd and hemostasis attained. Coban and gauze dressing applied. Transported with:  Yohana Lopez RN via w/c to vehicle driven by his wife.

## 2024-01-16 PROBLEM — I48.92 ATRIAL FLUTTER, UNSPECIFIED TYPE (HCC): Status: ACTIVE | Noted: 2024-01-16

## 2024-01-16 PROBLEM — L97.509 NON-PRESSURE CHRONIC ULCER OF OTHER PART OF UNSPECIFIED FOOT WITH UNSPECIFIED SEVERITY (HCC): Status: ACTIVE | Noted: 2024-01-16

## 2025-02-25 ENCOUNTER — TELEPHONE (OUTPATIENT)
Age: 75
End: 2025-02-25

## 2025-02-25 NOTE — TELEPHONE ENCOUNTER
Patient called to get appointment in June. Received referral in our box from Dr. Que Burgos related to endocarditis.

## 2025-03-06 ENCOUNTER — TELEPHONE (OUTPATIENT)
Age: 75
End: 2025-03-06

## 2025-03-06 NOTE — TELEPHONE ENCOUNTER
Incoming call from [facility] asking for written request for supporting documents. Faxed written request to fax number provided.

## 2025-03-06 NOTE — TELEPHONE ENCOUNTER
Spoke to Shahram Burgos's nurse from [facility]. He states that we will need to obtain records from [facility] regarding patient's foot infection because they were only seeing him for cardiac clearance for surgery.

## 2025-03-07 NOTE — TELEPHONE ENCOUNTER
Received general fax from medical records for patient. Did not have details of cultures and labs. Also notes were not related to foot infection. Called to see if we could get more specific information. LVM with medical records. Awaiting response.

## 2025-03-11 ENCOUNTER — TELEPHONE (OUTPATIENT)
Age: 75
End: 2025-03-11

## 2025-03-11 NOTE — TELEPHONE ENCOUNTER
Patient;'s wife Cathleen called,       She would like to know if Dr Dawson received the records she needs in order to see patient    Dr Burgos had referred patient    Please call wife to let her know     [phone]

## 2025-03-11 NOTE — TELEPHONE ENCOUNTER
Returned wife's phone call and she asked that I reach out to them to obtain whatever information we needed to go with referral will follow-up tomorrow due to us having clinic today.

## 2025-03-13 ENCOUNTER — TELEPHONE (OUTPATIENT)
Age: 75
End: 2025-03-13

## 2025-03-13 NOTE — TELEPHONE ENCOUNTER
Spoke Karolyn at [facility] after getting clarification they want patient to be seen by ID for foot infection.

## 2025-03-13 NOTE — TELEPHONE ENCOUNTER
Dr. Burgos's note reviewed.  Please ask his office what exactly the infectious disease consult was for?  Endocarditis?  Foot infection

## (undated) DEVICE — 4-PORT MANIFOLD: Brand: NEPTUNE 2

## (undated) DEVICE — SUT PROL 4-0 30IN SH1 DA BLU --

## (undated) DEVICE — SOLUTION IV 500ML 0.9% SOD CHL FLX CONT

## (undated) DEVICE — SOLUTION IV 1000ML PH 7.4 INJ NRMSOL R

## (undated) DEVICE — REM POLYHESIVE ADULT PATIENT RETURN ELECTRODE: Brand: VALLEYLAB

## (undated) DEVICE — INSERT SUT HLD F/OCTBS RETRCTR --

## (undated) DEVICE — THRUPORT SYSTEMS PROPLEGE PERIPHERAL RETROGRADE CARDIOPLEGIA DEVICE: Brand: PROPLEGE DEVICE KIT

## (undated) DEVICE — DRAPE SLUSH DISC W44XL66IN ST FOR RND BSIN HUSH SLUSH SYS

## (undated) DEVICE — COR-KNOT MINI® COMBO KITBASE PACKAGE TYPE - KITEACH STERILE PACKAGE KIT CONTAINS (2) SINGLE PATIENT USE COR-KNOT MINI® DEVICES AND (12) COR-KNOT® QUICK LOADS®.: Brand: COR-KNOT MINI®

## (undated) DEVICE — DRSG BORDR MPLX HEEL 8.7X9.1IN --

## (undated) DEVICE — Device

## (undated) DEVICE — SPLINT WR POS F/ARTERIAL ACC -- BX/10

## (undated) DEVICE — 40418 TRENDELENBURG ONE-STEP ARM PROTECTORS LARGE (1 PAIR): Brand: 40418 TRENDELENBURG ONE-STEP ARM PROTECTORS LARGE (1 PAIR)

## (undated) DEVICE — TR BAND RADIAL ARTERY COMPRESSION DEVICE: Brand: TR BAND

## (undated) DEVICE — GAUZE,SPONGE,FLUFF,6"X6.75",STRL,5/TRAY: Brand: MEDLINE

## (undated) DEVICE — SUTURE DEK POLY GRN BR SZ3 0 T 2 2N 6716

## (undated) DEVICE — PAD,ABDOMINAL,5"X9",ST,LF,25/BX: Brand: MEDLINE INDUSTRIES, INC.

## (undated) DEVICE — DRESSING SIL W4XL5IN ANTIBACT GELLING FBR CYTOFORM

## (undated) DEVICE — SYR 10ML LUER LOK 1/5ML GRAD --

## (undated) DEVICE — CANNULA PERF 15FR L12.5IN RG STPCOCK WIREWOUND BODY

## (undated) DEVICE — SUTURE VCRL SZ 0 L18IN ABSRB VLT L40MM CT 1/2 CIR J752D

## (undated) DEVICE — SUT PROL 4-0 36IN RB1 DA BLU --

## (undated) DEVICE — EXTREMITY - SMH: Brand: MEDLINE INDUSTRIES, INC.

## (undated) DEVICE — STERNUM BLADE, OFFSET (31.7 X 0.64 X 6.3MM)

## (undated) DEVICE — ENDOVENT PULMONARY CATHETER KIT: Brand: THRUPORT SYSTEMS ENDOVENT PULMONARY CATHETER KIT

## (undated) DEVICE — 3M™ TEGADERM™ TRANSPARENT FILM DRESSING FRAME STYLE, 1626W, 4 IN X 4-3/4 IN (10 CM X 12 CM), 50/CT 4CT/CASE: Brand: 3M™ TEGADERM™

## (undated) DEVICE — SUMP INTCARD W/ 1/4INCH CONN 20FRENCH 15INCH DLP

## (undated) DEVICE — Z DISCONTINUED NO SUB IDED CATHETER ANGIO 5-6FR L110CM GWIRE 0.038IN 145DEG PGTL 5-8

## (undated) DEVICE — SUTURE SZ 7 L18IN NONABSORBABLE SIL CCS L48MM 1/2 CIR STRNM M655G

## (undated) DEVICE — SUTURE TICRON DBL ARMED 2 0 CV 305 42IN BLU N ABSRB BRAID 8886303551

## (undated) DEVICE — STRAP,POSITIONING,KNEE/BODY,FOAM,4X60": Brand: MEDLINE

## (undated) DEVICE — SPECIAL PROCEDURE DRAPE 32" X 34": Brand: SPECIAL PROCEDURE DRAPE

## (undated) DEVICE — DRAPE PRB US TRNSDCR 6X96IN --

## (undated) DEVICE — SOLUTION IV 1000ML 0.9% SOD CHL

## (undated) DEVICE — KIT MFLD ISOLATN NACL CNTRST PRT TBNG SPIK W/ PRSS TRNSDUC

## (undated) DEVICE — KIT SURG OINT ST WET UNIV PREP BDINE

## (undated) DEVICE — PACK PROCEDURE SURG HRT CATH

## (undated) DEVICE — CANNULA AORT ROOT INTRO STD TIP 5FR OVERALL LEN 55IN DLP

## (undated) DEVICE — GLOVE SURG SZ 7.5 L11.2IN THK9.8MIL STRW LTX POLYMER BEAD

## (undated) DEVICE — ANGIOGRAPHIC CATHETER: Brand: IMPULSE™

## (undated) DEVICE — CATHETER DRAINAGE STR 13 FRX32 CM VENTRICULAR BULL TIP STYL

## (undated) DEVICE — CATHETER IV 14GA L2IN POLYUR STR ORNG HUB SFTY RADPQ DISP

## (undated) DEVICE — MEDI-TRACE CADENCE ADULT, DEFIBRILLATION ELECTRODE -RTS  (10 PR/PK) - PHILIPS: Brand: MEDI-TRACE CADENCE

## (undated) DEVICE — SUMP PERICARD AD 20FR WT TIP VERSATILE DSGN MULT PRT VENT

## (undated) DEVICE — COVER LT HNDL PLAS RIG 1 PER PK

## (undated) DEVICE — TTL1LYR 16FR10ML 100%SIL TMPST TR: Brand: MEDLINE

## (undated) DEVICE — GOWN,SIRUS,NONRNF,SETINSLV,XL,20/CS: Brand: MEDLINE

## (undated) DEVICE — PREP SKN CHLRAPRP APL 26ML STR --

## (undated) DEVICE — INFECTION CONTROL KIT SYS

## (undated) DEVICE — SYR 3ML LL TIP 1/10ML GRAD --

## (undated) DEVICE — DRAIN,WOUND,ROUND,24FR,5/16",FULL-FLUTED: Brand: MEDLINE

## (undated) DEVICE — CONNECTOR DRNGE W3/8-0.5XH3/16XL3/16IN 2:1 SIL CARD STR

## (undated) DEVICE — YANKAUER,BULB TIP,W/O VENT,RIGID,STERILE: Brand: MEDLINE

## (undated) DEVICE — DRESSING,GAUZE,XEROFORM,CURAD,5"X9",ST: Brand: CURAD

## (undated) DEVICE — SUTURE PROL SZ 5-0 L30IN NONABSORBABLE BLU L13MM RB-2 1/2 8710H

## (undated) DEVICE — KIT PERF CANN 21FR L18CM FEM ART PERC 3/8IN VENT CONN

## (undated) DEVICE — TIDISHIELD TRANSPORT, CONTAINMENT COVER FOR BACK TABLE 4'6" (1.37M) TO 8' (2.43M) IN LENGTH: Brand: TIDISHIELD

## (undated) DEVICE — CANN QUICK DRAW 25FR --

## (undated) DEVICE — CATHETER DIAG 6FR L110CM INTRO 6FR BLLN DIA9MM 1CC PULM ART

## (undated) DEVICE — INTENDED TO STANDARDIZE OR CAMERAS TO ALLOW FOR THE USE OF THE OR CAMERA COVER: Brand: ASPEN® O.R. CAMERA COVER

## (undated) DEVICE — BANDAGE,GAUZE,BULKEE II,4.5"X4.1YD,STRL: Brand: MEDLINE

## (undated) DEVICE — SOLUTION IRRIG 1000ML H2O STRL BLT

## (undated) DEVICE — SOLUTION IRRIG 3000ML 0.9% SOD CHL USP UROMATIC PLAS CONT

## (undated) DEVICE — WASTE KIT - ST MARY: Brand: MEDLINE INDUSTRIES, INC.

## (undated) DEVICE — 72" ARTERIAL PRESSURE TUBING: Brand: ICU MEDICAL

## (undated) DEVICE — NEEDLE HYPO 18GA L1.5IN PNK S STL HUB POLYPR SHLD REG BVL

## (undated) DEVICE — TRANSFER PK BLD PROD 300ML --

## (undated) DEVICE — 1000ML,PRESSURE INFUSER W/STOPCOCK: Brand: MEDLINE

## (undated) DEVICE — SPONGE GZ W4XL4IN COT 12 PLY TYP VII WVN C FLD DSGN

## (undated) DEVICE — BANDAGE COBAN 4 IN COMPR W4INXL5YD FOAM COHESIVE QUIK STK SELF ADH SFT

## (undated) DEVICE — SWAB CULT DBL W/O CHAR RAYON TIP AMIES GEL CLMN FOR COLL

## (undated) DEVICE — KIT HND CTRL 3 W STPCOCK ROT END 54IN PREM HI PRSS TBNG AT

## (undated) DEVICE — GUIDEWIRE VASC L260CM DIA0.035IN STR TIP L7CM PTFE FIX COR

## (undated) DEVICE — STERILE POLYISOPRENE POWDER-FREE SURGICAL GLOVES WITH EMOLLIENT COATING: Brand: PROTEXIS

## (undated) DEVICE — SUTURE PROL SZ 4-0 L36IN NONABSORBABLE BLU L26MM SH 1/2 CIR 8521H

## (undated) DEVICE — TUBING, SUCTION, 1/4" X 12', STRAIGHT: Brand: MEDLINE

## (undated) DEVICE — BAG RED 3PLY 2MIL 30X40 IN

## (undated) DEVICE — Device: Brand: VASCULAR DILATOR KIT

## (undated) DEVICE — SUT ETHBND 2-0 30IN V5 GRN-WHT --

## (undated) DEVICE — GLIDESHEATH SLENDER STAINLESS STEEL KIT: Brand: GLIDESHEATH SLENDER

## (undated) DEVICE — LUER-LOK 360°: Brand: CONNECTA, LUER-LOK

## (undated) DEVICE — AVID DUAL STAGE VENOUS DRAINAGE CANNULA: Brand: AVID DUAL STAGE VENOUS DRAINAGE CANNULA

## (undated) DEVICE — LOOP,VESSEL,MAXI,BLUE,2/PK,STERILE: Brand: MEDLINE

## (undated) DEVICE — COR-KNOT® QUICK LOAD® SINGLES: Brand: COR-KNOT® QUICK LOAD®

## (undated) DEVICE — SYR 50ML LR LCK 1ML GRAD NSAF --

## (undated) DEVICE — SUTURE MCRYL SZ 3-0 L27IN ABSRB UD L24MM PS-1 3/8 CIR PRIM Y936H

## (undated) DEVICE — LIMB HOLDER, WRIST/ANKLE: Brand: DEROYAL

## (undated) DEVICE — STRIP SKIN CLSR W0.25XL4IN WHT SPUNBOUND FBR NYL HI ADH

## (undated) DEVICE — AGENT HEMSTAT W4XL4IN OXIDIZED REGENERATED CELOS ABSRB SFT

## (undated) DEVICE — PRESSURE MONITORING SET: Brand: TRUWAVE

## (undated) DEVICE — OPTIFOAM GENTLE LIQUITRAP, SACRUM, 7"X7": Brand: MEDLINE

## (undated) DEVICE — GUIDEWIRE VASC L260CM DIA0.035IN TIP L3MM STD EXCHG PTFE J

## (undated) DEVICE — SPONGE GZ W4XL4IN COT 12 PLY TYP VII WVN C FLD DSGN STERILE

## (undated) DEVICE — BANDAGE COMPR W4INXL5YD WHT BGE POLY COT M E WRP WV HK AND

## (undated) DEVICE — DRAPE FLD WRM W44XL66IN C6L FOR INTRATEMP SYS THERMABASIN